# Patient Record
Sex: MALE | Race: WHITE | Employment: OTHER | URBAN - METROPOLITAN AREA
[De-identification: names, ages, dates, MRNs, and addresses within clinical notes are randomized per-mention and may not be internally consistent; named-entity substitution may affect disease eponyms.]

---

## 2019-04-24 ENCOUNTER — HOSPITAL ENCOUNTER (OUTPATIENT)
Dept: CARDIAC CATH/INVASIVE PROCEDURES | Age: 84
Discharge: HOME | End: 2019-04-24
Payer: MEDICARE

## 2019-04-24 VITALS
DIASTOLIC BLOOD PRESSURE: 83 MMHG | HEART RATE: 61 BPM | BODY MASS INDEX: 30.62 KG/M2 | HEIGHT: 68 IN | WEIGHT: 202 LBS | OXYGEN SATURATION: 94 % | RESPIRATION RATE: 16 BRPM | SYSTOLIC BLOOD PRESSURE: 135 MMHG | TEMPERATURE: 98.6 F

## 2019-04-24 LAB
EKG ATRIAL RATE: 357 BPM
EKG Q-T INTERVAL: 446 MS
EKG QRS DURATION: 106 MS
EKG QTC CALCULATION (BAZETT): 452 MS
EKG R AXIS: -8 DEGREES
EKG T AXIS: 2 DEGREES
EKG VENTRICULAR RATE: 62 BPM
GFR NON-AFRICAN AMERICAN: 45 ML/MIN
GFR SERPL CREATININE-BSD FRML MDRD: 55 ML/MIN
GFR SERPL CREATININE-BSD FRML MDRD: ABNORMAL ML/MIN/{1.73_M2}
GLUCOSE BLD-MCNC: 128 MG/DL (ref 74–100)
LV EF: 58 %
LVEF MODALITY: NORMAL
POC CHLORIDE: 105 MMOL/L (ref 98–107)
POC CREATININE: 1.48 MG/DL (ref 0.51–1.19)
POC HEMATOCRIT: 42 % (ref 41–53)
POC HEMOGLOBIN: 14.4 G/DL (ref 13.5–17.5)
POC INR: 3.2
POC POTASSIUM: 3.7 MMOL/L (ref 3.5–4.5)
POC SODIUM: 146 MMOL/L (ref 138–146)
PROTHROMBIN TIME, POC: 35.8 SEC (ref 10.4–14.2)

## 2019-04-24 PROCEDURE — 6360000002 HC RX W HCPCS

## 2019-04-24 PROCEDURE — 7100000010 HC PHASE II RECOVERY - FIRST 15 MIN

## 2019-04-24 PROCEDURE — 93312 ECHO TRANSESOPHAGEAL: CPT

## 2019-04-24 PROCEDURE — 7100000011 HC PHASE II RECOVERY - ADDTL 15 MIN

## 2019-04-24 PROCEDURE — 82947 ASSAY GLUCOSE BLOOD QUANT: CPT

## 2019-04-24 PROCEDURE — 93005 ELECTROCARDIOGRAM TRACING: CPT

## 2019-04-24 PROCEDURE — 82565 ASSAY OF CREATININE: CPT

## 2019-04-24 PROCEDURE — 82435 ASSAY OF BLOOD CHLORIDE: CPT

## 2019-04-24 PROCEDURE — 92960 CARDIOVERSION ELECTRIC EXT: CPT | Performed by: INTERNAL MEDICINE

## 2019-04-24 PROCEDURE — 2709999900 HC NON-CHARGEABLE SUPPLY

## 2019-04-24 PROCEDURE — 85610 PROTHROMBIN TIME: CPT

## 2019-04-24 PROCEDURE — 93325 DOPPLER ECHO COLOR FLOW MAPG: CPT

## 2019-04-24 PROCEDURE — 84295 ASSAY OF SERUM SODIUM: CPT

## 2019-04-24 PROCEDURE — 84132 ASSAY OF SERUM POTASSIUM: CPT

## 2019-04-24 PROCEDURE — 85014 HEMATOCRIT: CPT

## 2019-04-24 RX ORDER — SOTALOL HYDROCHLORIDE 80 MG/1
80 TABLET ORAL 2 TIMES DAILY
COMMUNITY

## 2019-04-24 RX ORDER — SODIUM CHLORIDE 0.9 % (FLUSH) 0.9 %
10 SYRINGE (ML) INJECTION PRN
Status: DISCONTINUED | OUTPATIENT
Start: 2019-04-24 | End: 2019-04-25 | Stop reason: HOSPADM

## 2019-04-24 RX ORDER — SODIUM CHLORIDE 0.9 % (FLUSH) 0.9 %
10 SYRINGE (ML) INJECTION EVERY 12 HOURS SCHEDULED
Status: DISCONTINUED | OUTPATIENT
Start: 2019-04-24 | End: 2019-04-25 | Stop reason: HOSPADM

## 2019-04-24 RX ORDER — SODIUM CHLORIDE 9 MG/ML
INJECTION, SOLUTION INTRAVENOUS CONTINUOUS
Status: DISCONTINUED | OUTPATIENT
Start: 2019-04-24 | End: 2019-04-25 | Stop reason: HOSPADM

## 2019-04-24 RX ORDER — WARFARIN SODIUM 5 MG/1
5 TABLET ORAL
COMMUNITY

## 2019-04-24 RX ORDER — VALSARTAN AND HYDROCHLOROTHIAZIDE 320; 12.5 MG/1; MG/1
1 TABLET, FILM COATED ORAL DAILY
COMMUNITY

## 2019-04-24 RX ORDER — UBIDECARENONE 75 MG
50 CAPSULE ORAL DAILY
COMMUNITY

## 2019-04-24 RX ORDER — ASPIRIN 81 MG/1
81 TABLET, CHEWABLE ORAL DAILY
COMMUNITY

## 2019-04-24 RX ORDER — DOXAZOSIN MESYLATE 4 MG/1
TABLET ORAL 2 TIMES DAILY
COMMUNITY

## 2019-04-24 RX ORDER — AMLODIPINE BESYLATE 5 MG/1
5 TABLET ORAL DAILY
COMMUNITY

## 2019-04-24 RX ORDER — SIMVASTATIN 40 MG
40 TABLET ORAL NIGHTLY
COMMUNITY

## 2019-04-24 RX ORDER — ALLOPURINOL 300 MG/1
300 TABLET ORAL DAILY
COMMUNITY

## 2019-04-24 RX ORDER — PAROXETINE HYDROCHLORIDE 20 MG/1
20 TABLET, FILM COATED ORAL EVERY MORNING
COMMUNITY

## 2019-04-24 RX ADMIN — SODIUM CHLORIDE: 9 INJECTION, SOLUTION INTRAVENOUS at 10:38

## 2019-04-24 NOTE — OP NOTE
[facility]  Transesophageal Echocardiogram and Cardioversion       Today's Date:  4/24/2019  Ordering Physician:  Dr. Elijah Reardon  Indication:   A. fib    Operators:  Primary:   Dr. Joshua Camacho (Attending Physician)  Assistant:   Salina Arndt MD (Cardiovascular Fellow)    Pre Procedure Conscious Sedation Data:    ASA Class:    [] I [] II [x] III [] IV    Mallampati Class:  [] I [] II [x] III [] IV    Patient seen and examined. History and Physical reviewed. Labs reviewed. After informed consent was obtained with explanation of the risks and benefits, the patient was brought to Cath lab. All sedation was administered by the cardiologist. The oropharynx was pre anesthetisized with cetacaine spray. FABIAN:    Structures:    LA:  Dilated  NEHEMIAH:  No thrombus  RA:  Dilated  RV:   Normal  LV:  Normal  Estimated LVEF: 55%  Aorta:   Mild atheromatous disease arch  Pericardium: No pericardial effusion  Septum:  No intracardiac shunt via color Doppler. Valves:    Mitral Valve:  Structurally normal. Mild regurgitation is identified with a central jet noted. Aortic Valve: The aortic valve is trileaflet and opens adequately. No regurgitiation is identified. Tricuspid valve: Structurally normal. Trace-mild regurgitation is identified. Pulmonary valve: Normal. No significant regurgitation    No valvular vegetations or thrombus identified. FABIAN Summary:     1. A FABIAN was performed without complications. 2. LVEF 55%  3. No thrombus or valvular vegetation identified  4. Mild MR and trace-mild TR  4. Proceed with Cardioversion      CARDIOVERSION:    After an adequate level of sedation was achieved, 360J in biphasic synchronized delivery was administered. Conversion to normal sinus rhythm. Cardioversion was successful. The patient awoke without complications. A post procedure 12 lead ECG was ordered. There were no complications encountered.     The patient will continue with the discharge meds and has

## 2019-04-24 NOTE — OP NOTE
Today's Date: 4/24/2019  Primary/Ordering Cardiologist:   Indication: atrial fibrillation. Patient seen and examined. History and Physical reviewed. Labs reviewed. After informed consent was obtained with explanation of the risks and benefits, the patient was prepared using standard tecqniques. All Conscious Sedation was administered via the Cardiologist.     CARDIOVERSION:    After an adequate level of sedation was achieved  360J in biphasic synchronized delivery was administered. conversion to normal sinus rhythm. The patient awoke without complications. A post procedure 12 L ECG was ordered and reviewed. The patient will continue with the same medications. Long term care and cardiovascular management    Impression:  Successful Consious Sedation - safely  Successful Cardioversion    Complications: There were no complications encountered.

## 2019-04-24 NOTE — PROGRESS NOTES
Patient admitted, consent signed and questions answered. Patient ready for procedure. Call light to reach with side rails up 2 of 2. Family at bedside with patient. History and physical needing update.

## 2019-04-24 NOTE — H&P
Juan Diego Wagner is an 82 y.o. who presents for Elective FABIAN cardioversion. Past Medical History:   Diagnosis Date    Anticoagulated     COUMADIN    Atrial fibrillation (HCC)     Diabetes mellitus ([address]     Dribbling urine     Gout     H/O cataract     H/O migraine     H/O prostate cancer     Hematuria     HTN (hypertension)     Kidney stones     SUKUMAR (obstructive sleep apnea)        Allergies: No Known Allergies    Active Problems:    * No active hospital problems. *  Resolved Problems:    * No resolved hospital problems. *    Blood pressure (!) 163/98, pulse 65, temperature 98.6 °F (37 °C), temperature source Oral, resp. rate 18, height 5' 8\" (1.727 m), weight 202 lb (91.6 kg), SpO2 93 %. Review of Systems    Physical Exam  Chest- clear to auscultation  cvs s1s2 irregular  Abd. Soft bs present  Ext. No edema. Assessment:  Early persistent atrial fibrillation    Plan: Will proceed with [facility] cardioversion.      Robin Sorenson MD  4/24/2019

## 2019-04-24 NOTE — PROGRESS NOTES
Received post FABIAN/CV to [facility] to room 12. Assessment obtained. Restrictions reviewed with patient. Post procedure pathway initiated. Family at side. Patient without complaints.

## 2019-04-25 LAB
EKG ATRIAL RATE: 57 BPM
EKG P AXIS: 42 DEGREES
EKG P-R INTERVAL: 232 MS
EKG Q-T INTERVAL: 494 MS
EKG QRS DURATION: 110 MS
EKG QTC CALCULATION (BAZETT): 480 MS
EKG R AXIS: -7 DEGREES
EKG T AXIS: 9 DEGREES
EKG VENTRICULAR RATE: 57 BPM

## 2022-05-15 ENCOUNTER — APPOINTMENT (OUTPATIENT)
Dept: GENERAL RADIOLOGY | Age: 87
End: 2022-05-15
Payer: MEDICARE

## 2022-05-15 ENCOUNTER — APPOINTMENT (OUTPATIENT)
Dept: CT IMAGING | Age: 87
End: 2022-05-15
Payer: MEDICARE

## 2022-05-15 ENCOUNTER — HOSPITAL ENCOUNTER (EMERGENCY)
Age: 87
Discharge: HOME | End: 2022-05-15
Attending: EMERGENCY MEDICINE
Payer: MEDICARE

## 2022-05-15 VITALS
WEIGHT: 200 LBS | HEIGHT: 68 IN | BODY MASS INDEX: 30.31 KG/M2 | OXYGEN SATURATION: 89 % | TEMPERATURE: 100.2 F | SYSTOLIC BLOOD PRESSURE: 160 MMHG | HEART RATE: 87 BPM | DIASTOLIC BLOOD PRESSURE: 116 MMHG | RESPIRATION RATE: 20 BRPM

## 2022-05-15 DIAGNOSIS — R41.3 MEMORY CHANGES: Primary | ICD-10-CM

## 2022-05-15 LAB
ABSOLUTE EOS #: 0.21 K/UL (ref 0–0.44)
ABSOLUTE IMMATURE GRANULOCYTE: 0.02 K/UL (ref 0–0.3)
ABSOLUTE LYMPH #: 1.94 K/UL (ref 1.1–3.7)
ABSOLUTE MONO #: 0.41 K/UL (ref 0.1–1.2)
ANION GAP SERPL CALCULATED.3IONS-SCNC: 12 MMOL/L (ref 9–17)
BASOPHILS # BLD: 1 % (ref 0–2)
BASOPHILS ABSOLUTE: 0.04 K/UL (ref 0–0.2)
BUN BLDV-MCNC: 23 MG/DL (ref 8–23)
BUN/CREAT BLD: 17 (ref 9–20)
CALCIUM SERPL-MCNC: 9.4 MG/DL (ref 8.6–10.4)
CHLORIDE BLD-SCNC: 103 MMOL/L (ref 98–107)
CO2: 27 MMOL/L (ref 20–31)
CREAT SERPL-MCNC: 1.32 MG/DL (ref 0.7–1.2)
EOSINOPHILS RELATIVE PERCENT: 3 % (ref 1–4)
GFR AFRICAN AMERICAN: >60 ML/MIN
GFR NON-AFRICAN AMERICAN: 52 ML/MIN
GFR SERPL CREATININE-BSD FRML MDRD: ABNORMAL ML/MIN/{1.73_M2}
GLUCOSE BLD-MCNC: 159 MG/DL (ref 70–99)
GLUCOSE BLD-MCNC: 165 MG/DL (ref 75–110)
HCT VFR BLD CALC: 40.8 % (ref 40.7–50.3)
HEMOGLOBIN: 13.6 G/DL (ref 13–17)
IMMATURE GRANULOCYTES: 0 %
INR BLD: 1
LACTIC ACID: 2.8 MMOL/L (ref 0.5–2.2)
LYMPHOCYTES # BLD: 31 % (ref 24–43)
MAGNESIUM: 1.8 MG/DL (ref 1.6–2.6)
MCH RBC QN AUTO: 31.9 PG (ref 25.2–33.5)
MCHC RBC AUTO-ENTMCNC: 33.3 G/DL (ref 28.4–34.8)
MCV RBC AUTO: 95.6 FL (ref 82.6–102.9)
MONOCYTES # BLD: 7 % (ref 3–12)
NRBC AUTOMATED: 0 PER 100 WBC
PDW BLD-RTO: 13.1 % (ref 11.8–14.4)
PLATELET # BLD: 157 K/UL (ref 138–453)
PMV BLD AUTO: 10.2 FL (ref 8.1–13.5)
POTASSIUM SERPL-SCNC: 3.8 MMOL/L (ref 3.7–5.3)
PROTHROMBIN TIME: 12.9 SEC (ref 11.5–14.2)
RBC # BLD: 4.27 M/UL (ref 4.21–5.77)
SEG NEUTROPHILS: 58 % (ref 36–65)
SEGMENTED NEUTROPHILS ABSOLUTE COUNT: 3.61 K/UL (ref 1.5–8.1)
SODIUM BLD-SCNC: 142 MMOL/L (ref 135–144)
TOTAL CK: 97 U/L (ref 39–308)
TROPONIN, HIGH SENSITIVITY: 43 NG/L (ref 0–22)
WBC # BLD: 6.2 K/UL (ref 3.5–11.3)

## 2022-05-15 PROCEDURE — 85025 COMPLETE CBC W/AUTO DIFF WBC: CPT

## 2022-05-15 PROCEDURE — 84484 ASSAY OF TROPONIN QUANT: CPT

## 2022-05-15 PROCEDURE — 71045 X-RAY EXAM CHEST 1 VIEW: CPT

## 2022-05-15 PROCEDURE — 93005 ELECTROCARDIOGRAM TRACING: CPT | Performed by: EMERGENCY MEDICINE

## 2022-05-15 PROCEDURE — 70450 CT HEAD/BRAIN W/O DYE: CPT

## 2022-05-15 PROCEDURE — 82550 ASSAY OF CK (CPK): CPT

## 2022-05-15 PROCEDURE — 80048 BASIC METABOLIC PNL TOTAL CA: CPT

## 2022-05-15 PROCEDURE — 99285 EMERGENCY DEPT VISIT HI MDM: CPT

## 2022-05-15 PROCEDURE — 36415 COLL VENOUS BLD VENIPUNCTURE: CPT

## 2022-05-15 PROCEDURE — 83735 ASSAY OF MAGNESIUM: CPT

## 2022-05-15 PROCEDURE — 85610 PROTHROMBIN TIME: CPT

## 2022-05-15 PROCEDURE — 82947 ASSAY GLUCOSE BLOOD QUANT: CPT

## 2022-05-15 PROCEDURE — 83605 ASSAY OF LACTIC ACID: CPT

## 2022-05-15 RX ORDER — SERTRALINE HYDROCHLORIDE 20 MG/ML
25 SOLUTION ORAL DAILY
COMMUNITY

## 2022-05-15 RX ORDER — FERROUS SULFATE 325(65) MG
325 TABLET ORAL
COMMUNITY

## 2022-05-15 RX ORDER — POTASSIUM CHLORIDE 1.5 G/1.77G
20 POWDER, FOR SOLUTION ORAL 2 TIMES DAILY
COMMUNITY

## 2022-05-15 RX ORDER — RIVASTIGMINE TARTRATE 3 MG/1
3 CAPSULE ORAL 2 TIMES DAILY
COMMUNITY

## 2022-05-15 RX ORDER — SERTRALINE HYDROCHLORIDE 25 MG/1
20 TABLET, FILM COATED ORAL DAILY
COMMUNITY

## 2022-05-15 RX ORDER — FUROSEMIDE 40 MG/1
40 TABLET ORAL DAILY
COMMUNITY

## 2022-05-15 ASSESSMENT — ENCOUNTER SYMPTOMS
BACK PAIN: 0
CHEST TIGHTNESS: 0
EYE PAIN: 0
ABDOMINAL PAIN: 0
ABDOMINAL DISTENTION: 0
SHORTNESS OF BREATH: 0
FACIAL SWELLING: 0
EYE DISCHARGE: 0

## 2022-05-15 ASSESSMENT — PAIN - FUNCTIONAL ASSESSMENT: PAIN_FUNCTIONAL_ASSESSMENT: NONE - DENIES PAIN

## 2022-05-15 NOTE — ED PROVIDER NOTES
EMERGENCY DEPARTMENT ENCOUNTER    Pt Name: Caron Overton  MRN: [id number]  [place] 1935  Date of evaluation: 5/15/22  CHIEF COMPLAINT       Chief Complaint   Patient presents with    Altered Mental Status     HISTORY OF PRESENT ILLNESS   HPI   Patient is a 61-year-old male who was brought to the emergency department by EMS after he was found walking on the highway. Patient stated that he was walking to the [employer] to buy a newspaper. He denied any trauma or injury. States he does not drive. Dated that he walked on the highway because he saw a lady in a black dress on the highway and was attempting to get to her. Per EMS there was no evidence of auto accident. Patient's denies falling or hitting his head. Denies chest pain, shortness of breath, nausea, vomiting, fevers or chills    REVIEW OF SYSTEMS     Review of Systems   Constitutional: Negative for chills, diaphoresis and fever. HENT: Negative for congestion, ear pain and facial swelling. Eyes: Negative for pain, discharge and visual disturbance. Respiratory: Negative for chest tightness and shortness of breath. Cardiovascular: Negative for chest pain and palpitations. Gastrointestinal: Negative for abdominal distention and abdominal pain. Genitourinary: Negative for difficulty urinating and flank pain. Musculoskeletal: Negative for back pain. Skin: Negative for wound. Neurological: Negative for dizziness, light-headedness and headaches.      PASTMEDICAL HISTORY     Past Medical History:   Diagnosis Date    Anticoagulated     COUMADIN    Atrial fibrillation (HCC)     Diabetes mellitus ([address]     Dribbling urine     Gout     H/O cataract     H/O migraine     H/O prostate cancer     Hematuria     HTN (hypertension)     Kidney stones     SUKUMAR (obstructive sleep apnea)      SURGICAL HISTORY       Past Surgical History:   Procedure Laterality Date    APPENDECTOMY      BLADDER SURGERY      SPHINTER CUFF INSERT    CARDIOVERSION 04/24/2019    CHOLECYSTECTOMY      JOINT REPLACEMENT      RIGHT AND LEFT    LITHOTRIPSY      PROSTATECTOMY      ROTATOR CUFF REPAIR Left     TRANSESOPHAGEAL ECHOCARDIOGRAM  04/24/2019     CURRENT MEDICATIONS       Previous Medications    ALLOPURINOL (ZYLOPRIM) 300 MG TABLET    Take 300 mg by mouth daily    AMLODIPINE (NORVASC) 5 MG TABLET    Take 5 mg by mouth daily    ASPIRIN 81 MG CHEWABLE TABLET    Take 81 mg by mouth daily    DAPAGLIFLOZIN (FARXIGA) 5 MG TABLET    Take 5 mg by mouth every morning    DOXAZOSIN (CARDURA) 4 MG TABLET    Take by mouth 2 times daily    FERROUS SULFATE (IRON 325) 325 (65 FE) MG TABLET    Take 325 mg by mouth daily (with breakfast)    FUROSEMIDE (LASIX) 40 MG TABLET    Take 40 mg by mouth daily    METFORMIN (GLUCOPHAGE) 500 MG TABLET    Take 500 mg by mouth daily    PAROXETINE (PAXIL) 20 MG TABLET    Take 20 mg by mouth every morning    POTASSIUM CHLORIDE (KLOR-CON) 20 MEQ PACKET    Take 20 mEq by mouth 2 times daily    RIVASTIGMINE (EXELON) 3 MG CAPSULE    Take 3 mg by mouth 2 times daily    SERTRALINE (ZOLOFT) 20 MG/ML CONCENTRATED SOLUTION    Take 25 mg by mouth daily    SERTRALINE (ZOLOFT) 25 MG TABLET    Take 20 mg by mouth daily    SIMVASTATIN (ZOCOR) 40 MG TABLET    Take 40 mg by mouth nightly    SOTALOL (BETAPACE) 80 MG TABLET    Take 80 mg by mouth 2 times daily    VALSARTAN-HYDROCHLOROTHIAZIDE (DIOVAN-HCT) 320-12.5 MG PER TABLET    Take 1 tablet by mouth daily    VITAMIN B-12 (CYANOCOBALAMIN) 100 MCG TABLET    Take 50 mcg by mouth daily    VITAMIN D (CHOLECALCIFEROL) 1000 UNIT TABS TABLET    Take 1,000 Units by mouth daily    WARFARIN (COUMADIN) 5 MG TABLET    Take 5 mg by mouth     ALLERGIES     has No Known Allergies. FAMILY HISTORY     has no family status information on file.       SOCIAL HISTORY       Social History     Tobacco Use    Smoking status: Never Smoker    Smokeless tobacco: Never Used   Vaping Use    Vaping Use: Never used   Substance Use Topics    Alcohol use: Not Currently    Drug use: Never     PHYSICAL EXAM     INITIAL VITALS: BP (!) 160/116   Pulse 87   Temp 100.2 °F (37.9 °C) (Oral)   Resp 20   Ht 5' 8\" (1.727 m)   Wt 200 lb (90.7 kg)   SpO2 (!) 89%   BMI 30.41 kg/m²    Physical Exam  Vitals and nursing note reviewed. Constitutional:       General: He is not in acute distress. Appearance: He is well-developed. He is not diaphoretic. HENT:      Head: Normocephalic and atraumatic. Eyes:      Pupils: Pupils are equal, round, and reactive to light. Cardiovascular:      Rate and Rhythm: Normal rate and regular rhythm. Pulmonary:      Effort: Pulmonary effort is normal.      Breath sounds: Normal breath sounds. Abdominal:      General: Bowel sounds are normal.      Palpations: Abdomen is soft. Musculoskeletal:         General: Normal range of motion. Cervical back: Normal range of motion and neck supple. Skin:     General: Skin is warm. Capillary Refill: Capillary refill takes less than 2 seconds. Neurological:      Mental Status: He is alert and oriented to person, place, and time. MEDICAL DECISION MAKING:   The patient is a 82-year-old male who presented to the emergency department by EMS secondary to being found walking on the highway. Patient has an IUD he is alert to name place and circumstance but is unable to recall his phone number. Patient's family contacted wife stated that she had not seen him since last night and she not where he was. Imaging and labs will be obtained, awaiting family arrival.  Slightly elevated lactic acid otherwise no acute findings on laboratory analysis or imaging. Patient's family presented to the emergency department. Patient's wife and family felt that patient had got confused while going to get a newspaper. Apparently 1 month ago he walked to get a newspaper on his own and was proud of himself for that and does not drive.   Patient's had no focal neurodeficit on exam ambulating without difficulty. Discharged home with family given outpatient follow-up and parameters to return to the emergency department       All patient's question's and concerns were answered prior to disposition and patient and/or family expressed understanding and agreement of treatment plan. CRITICAL CARE:              NIH STROKE SCALE:            PROCEDURES:    Procedures    DIAGNOSTIC RESULTS   EKG:All EKG's are interpreted by the Emergency Department Physician who either signs or Co-signs this chart in the absence of a cardiologist.        RADIOLOGY:All plain film, CT, MRI, and formal ultrasound images (except ED bedside ultrasound) are read by the radiologist, see reports below, unless otherwisenoted in MDM or here. CT Head WO Contrast   Final Result   No acute intracranial abnormality. XR CHEST PORTABLE   Preliminary Result   Low lung volumes with cardiomegaly. No evidence of acute process. LABS: All lab results were reviewed by myself, and all abnormals are listed below.   Labs Reviewed   LACTIC ACID - Abnormal; Notable for the following components:       Result Value    Lactic Acid 2.8 (*)     All other components within normal limits   BASIC METABOLIC PANEL W/ REFLEX TO MG FOR LOW K - Abnormal; Notable for the following components:    Glucose 159 (*)     CREATININE 1.32 (*)     GFR Non- 52 (*)     All other components within normal limits   TROPONIN - Abnormal; Notable for the following components:    Troponin, High Sensitivity 43 (*)     All other components within normal limits   POC GLUCOSE FINGERSTICK - Abnormal; Notable for the following components:    POC Glucose 165 (*)     All other components within normal limits   CK   MAGNESIUM   CBC WITH AUTO DIFFERENTIAL   PROTIME-INR   URINALYSIS WITH MICROSCOPIC   POCT GLUCOSE       EMERGENCY DEPARTMENTCOURSE:         Vitals:    Vitals:    05/15/22 0825   BP: (!) 160/116   Pulse: 87   Resp: 20   Temp: 100.2 °F (37.9 °C)   TempSrc: Oral   SpO2: (!) 89%   Weight: 200 lb (90.7 kg)   Height: 5' 8\" (1.727 m)       The patient was given the following medications while in the emergency department:  No orders of the defined types were placed in this encounter. CONSULTS:  None    FINAL IMPRESSION      1. Memory changes          DISPOSITION/PLAN   DISPOSITION Ed Observation 05/15/2022 10:25:55 AM      PATIENT REFERRED TO:  No follow-up provider specified. DISCHARGE MEDICATIONS:  New Prescriptions    No medications on file     Pee Rincon MD  Attending Emergency Physician      The care is provided during an unprecedented national emergency due to the novel coronavirus, COVID 19. This note was created with the assistance of a speech-recognition program. While intending to generate a document that actually reflects the content of the visit, no guarantees can be provided that every mistake has been identified and corrected by editing.     Lake Brandt MD  18/37/23 0727

## 2022-05-16 LAB
EKG ATRIAL RATE: 76 BPM
EKG P AXIS: 17 DEGREES
EKG P-R INTERVAL: 210 MS
EKG Q-T INTERVAL: 430 MS
EKG QRS DURATION: 114 MS
EKG QTC CALCULATION (BAZETT): 483 MS
EKG R AXIS: -59 DEGREES
EKG T AXIS: 70 DEGREES
EKG VENTRICULAR RATE: 76 BPM

## 2023-04-19 ENCOUNTER — HOSPITAL ENCOUNTER (OUTPATIENT)
Dept: ULTRASOUND IMAGING | Age: 88
Discharge: HOME | End: 2023-04-21
Payer: MEDICARE

## 2023-04-19 ENCOUNTER — ANESTHESIA EVENT (OUTPATIENT)
Dept: OPERATING ROOM | Age: 88
End: 2023-04-19
Payer: MEDICARE

## 2023-04-19 ENCOUNTER — ANESTHESIA (OUTPATIENT)
Dept: OPERATING ROOM | Age: 88
End: 2023-04-19
Payer: MEDICARE

## 2023-04-19 ENCOUNTER — HOSPITAL ENCOUNTER (OUTPATIENT)
Age: 88
Setting detail: OUTPATIENT SURGERY
Discharge: HOME | End: 2023-04-19
Attending: INTERNAL MEDICINE | Admitting: INTERNAL MEDICINE
Payer: MEDICARE

## 2023-04-19 VITALS
OXYGEN SATURATION: 95 % | HEIGHT: 68 IN | DIASTOLIC BLOOD PRESSURE: 73 MMHG | HEART RATE: 65 BPM | WEIGHT: 191 LBS | TEMPERATURE: 97 F | BODY MASS INDEX: 28.95 KG/M2 | RESPIRATION RATE: 18 BRPM | SYSTOLIC BLOOD PRESSURE: 132 MMHG

## 2023-04-19 DIAGNOSIS — K86.89 PANCREATIC MASS: ICD-10-CM

## 2023-04-19 LAB
ANION GAP: 14 MMOL/L (ref 7–16)
EGFR, POC: 46 ML/MIN/1.73M2
GLUCOSE BLD-MCNC: 106 MG/DL (ref 75–110)
GLUCOSE BLD-MCNC: 126 MG/DL (ref 74–100)
POC BUN: 27 MG/DL (ref 8–26)
POC CHLORIDE: 104 MMOL/L (ref 98–107)
POC CREATININE: 1.47 MG/DL (ref 0.51–1.19)
POC IONIZED CALCIUM: 1.27 MMOL/L (ref 1.15–1.33)
POC POTASSIUM: 3.6 MMOL/L (ref 3.5–4.5)
POC SODIUM: 143 MMOL/L (ref 138–146)
POC TCO2: 26 MMOL/L (ref 22–30)

## 2023-04-19 PROCEDURE — 6360000002 HC RX W HCPCS

## 2023-04-19 PROCEDURE — 3609012400 HC EGD TRANSORAL BIOPSY SINGLE/MULTIPLE: Performed by: INTERNAL MEDICINE

## 2023-04-19 PROCEDURE — 88305 TISSUE EXAM BY PATHOLOGIST: CPT

## 2023-04-19 PROCEDURE — 93005 ELECTROCARDIOGRAM TRACING: CPT | Performed by: ANESTHESIOLOGY

## 2023-04-19 PROCEDURE — 3609156700 HC PROCTOSIGMOIDOSCOPY DX: Performed by: INTERNAL MEDICINE

## 2023-04-19 PROCEDURE — 84520 ASSAY OF UREA NITROGEN: CPT

## 2023-04-19 PROCEDURE — 3609018500 HC EGD US SCOPE W/ADJACENT STRUCTURES: Performed by: INTERNAL MEDICINE

## 2023-04-19 PROCEDURE — 76975 GI ENDOSCOPIC ULTRASOUND: CPT | Performed by: INTERNAL MEDICINE

## 2023-04-19 PROCEDURE — 80051 ELECTROLYTE PANEL: CPT

## 2023-04-19 PROCEDURE — 82330 ASSAY OF CALCIUM: CPT

## 2023-04-19 PROCEDURE — 7100000010 HC PHASE II RECOVERY - FIRST 15 MIN: Performed by: INTERNAL MEDICINE

## 2023-04-19 PROCEDURE — 3700000000 HC ANESTHESIA ATTENDED CARE: Performed by: INTERNAL MEDICINE

## 2023-04-19 PROCEDURE — 3700000001 HC ADD 15 MINUTES (ANESTHESIA): Performed by: INTERNAL MEDICINE

## 2023-04-19 PROCEDURE — 2500000003 HC RX 250 WO HCPCS

## 2023-04-19 PROCEDURE — 82565 ASSAY OF CREATININE: CPT

## 2023-04-19 PROCEDURE — 2580000003 HC RX 258: Performed by: ANESTHESIOLOGY

## 2023-04-19 PROCEDURE — 82947 ASSAY GLUCOSE BLOOD QUANT: CPT

## 2023-04-19 PROCEDURE — 2709999900 HC NON-CHARGEABLE SUPPLY: Performed by: INTERNAL MEDICINE

## 2023-04-19 PROCEDURE — 7100000011 HC PHASE II RECOVERY - ADDTL 15 MIN: Performed by: INTERNAL MEDICINE

## 2023-04-19 RX ORDER — FENTANYL CITRATE 50 UG/ML
50 INJECTION, SOLUTION INTRAMUSCULAR; INTRAVENOUS EVERY 5 MIN PRN
Status: DISCONTINUED | OUTPATIENT
Start: 2023-04-19 | End: 2023-04-19 | Stop reason: HOSPADM

## 2023-04-19 RX ORDER — PANTOPRAZOLE SODIUM 40 MG/1
40 TABLET, DELAYED RELEASE ORAL
Qty: 30 TABLET | Refills: 0 | Status: SHIPPED | OUTPATIENT
Start: 2023-04-19

## 2023-04-19 RX ORDER — LIDOCAINE HYDROCHLORIDE 10 MG/ML
INJECTION, SOLUTION EPIDURAL; INFILTRATION; INTRACAUDAL; PERINEURAL PRN
Status: DISCONTINUED | OUTPATIENT
Start: 2023-04-19 | End: 2023-04-19 | Stop reason: SDUPTHER

## 2023-04-19 RX ORDER — FENTANYL CITRATE 50 UG/ML
25 INJECTION, SOLUTION INTRAMUSCULAR; INTRAVENOUS EVERY 5 MIN PRN
Status: DISCONTINUED | OUTPATIENT
Start: 2023-04-19 | End: 2023-04-19 | Stop reason: HOSPADM

## 2023-04-19 RX ORDER — SODIUM CHLORIDE 0.9 % (FLUSH) 0.9 %
5-40 SYRINGE (ML) INJECTION EVERY 12 HOURS SCHEDULED
Status: DISCONTINUED | OUTPATIENT
Start: 2023-04-19 | End: 2023-04-19 | Stop reason: HOSPADM

## 2023-04-19 RX ORDER — SODIUM CHLORIDE 9 MG/ML
INJECTION, SOLUTION INTRAVENOUS PRN
Status: DISCONTINUED | OUTPATIENT
Start: 2023-04-19 | End: 2023-04-19 | Stop reason: HOSPADM

## 2023-04-19 RX ORDER — PROPOFOL 10 MG/ML
INJECTION, EMULSION INTRAVENOUS PRN
Status: DISCONTINUED | OUTPATIENT
Start: 2023-04-19 | End: 2023-04-19 | Stop reason: SDUPTHER

## 2023-04-19 RX ORDER — SODIUM CHLORIDE 0.9 % (FLUSH) 0.9 %
5-40 SYRINGE (ML) INJECTION PRN
Status: DISCONTINUED | OUTPATIENT
Start: 2023-04-19 | End: 2023-04-19 | Stop reason: HOSPADM

## 2023-04-19 RX ORDER — ONDANSETRON 2 MG/ML
4 INJECTION INTRAMUSCULAR; INTRAVENOUS
Status: DISCONTINUED | OUTPATIENT
Start: 2023-04-19 | End: 2023-04-19 | Stop reason: HOSPADM

## 2023-04-19 RX ORDER — HYDRALAZINE HYDROCHLORIDE 20 MG/ML
10 INJECTION INTRAMUSCULAR; INTRAVENOUS
Status: DISCONTINUED | OUTPATIENT
Start: 2023-04-19 | End: 2023-04-19 | Stop reason: HOSPADM

## 2023-04-19 RX ORDER — PROPOFOL 10 MG/ML
INJECTION, EMULSION INTRAVENOUS CONTINUOUS PRN
Status: DISCONTINUED | OUTPATIENT
Start: 2023-04-19 | End: 2023-04-19 | Stop reason: SDUPTHER

## 2023-04-19 RX ORDER — SODIUM CHLORIDE, SODIUM LACTATE, POTASSIUM CHLORIDE, CALCIUM CHLORIDE 600; 310; 30; 20 MG/100ML; MG/100ML; MG/100ML; MG/100ML
INJECTION, SOLUTION INTRAVENOUS CONTINUOUS
Status: DISCONTINUED | OUTPATIENT
Start: 2023-04-19 | End: 2023-04-19 | Stop reason: HOSPADM

## 2023-04-19 RX ADMIN — LIDOCAINE HYDROCHLORIDE 50 MG: 10 INJECTION, SOLUTION EPIDURAL; INFILTRATION; INTRACAUDAL; PERINEURAL at 12:16

## 2023-04-19 RX ADMIN — SODIUM CHLORIDE, POTASSIUM CHLORIDE, SODIUM LACTATE AND CALCIUM CHLORIDE: 600; 310; 30; 20 INJECTION, SOLUTION INTRAVENOUS at 11:33

## 2023-04-19 RX ADMIN — PROPOFOL 10 MG: 10 INJECTION, EMULSION INTRAVENOUS at 12:27

## 2023-04-19 RX ADMIN — PROPOFOL 10 MG: 10 INJECTION, EMULSION INTRAVENOUS at 12:16

## 2023-04-19 RX ADMIN — PROPOFOL 150 MCG/KG/MIN: 10 INJECTION, EMULSION INTRAVENOUS at 12:16

## 2023-04-19 RX ADMIN — PROPOFOL 10 MG: 10 INJECTION, EMULSION INTRAVENOUS at 12:37

## 2023-04-19 ASSESSMENT — PAIN SCALES - GENERAL
PAINLEVEL_OUTOF10: 0

## 2023-04-19 ASSESSMENT — PAIN - FUNCTIONAL ASSESSMENT: PAIN_FUNCTIONAL_ASSESSMENT: NONE - DENIES PAIN

## 2023-04-19 NOTE — DISCHARGE INSTRUCTIONS
[facility]    POST-ENDOSCOPY INSTRUCTIONS    1. ACTIVITY   No driving, operating machinery, or making important decisions for 24 hours. Resume normal activity after 24 hours. You may return to work after 24 hours. 2. DIET        Resume your usual diet unless specified below. 3. MEDICATIONS    Resume your usual medications. Do not consume alcohol, tranquilizers, or sleeping medications for 24 hour unless advised by your physician. 4. PHYSICIAN FOLLOW-UP / INSTRUCTIONS    Please call the office/clinic in 10 days for biopsy results:      [  ] GI office:  [phone]          Follow up with your family physician as planned. 6. NORMAL CHANGES YOU MAY EXPERIENCE AFTER ENDOSCOPY:         EGD/EUS             FLEXIBLE SIGMOIDOSCOPY      Sore throat after EGD/EUS                 Passing of gas for several hours. A bloated feeling and belching from  Some mild abdominal cramping. air in stomach               You may feel fatigued for the next 24-48    hours due to the prep and sedation    7. CALL YOUR PHYSICIAN IF YOU EXPERIENCE ANY OF THE FOLLOWING      A. Passing blood rectally or vomiting blood (color may be red or black)      B. Severe abdominal pain or tenderness (that is not relieved by passing air)      C. Fever, chills, or excessive sweating      D.  Persistent nausea or vomiting      E.  Redness or swelling at the IV site    If you have additional questions, PLEASE call your doctor or the [place] [id number] [facility] ([phone])           No alcoholic beverages, no driving or operating machinery, no making important decisions for 24 hours. You may have a normal diet but should eat lightly day of surgery. Drink plenty of fluids.   Urinate within 8 hours after surgery, if unable to urinate call your doctor

## 2023-04-19 NOTE — OP NOTE
Electronically signed by Peggy Grant MD on 4/19/2023 at 12:25 PM
Repeat CT pancreas in 3 months to document cyst size stability                                      Follow up with Dr Michelle Romero.          Electronically signed by Joice Carrel, MD on 4/19/2023 at 12:56 PM

## 2023-04-19 NOTE — H&P
VITALS:  height is 5' 8\" (1.727 m) and weight is 191 lb (86.6 kg). His temporal temperature is 96.8 °F (36 °C). His blood pressure is 146/94 (abnormal) and his pulse is 72. His respiration is 18 and oxygen saturation is 94%. CONSTITUTIONAL:alert & cooperative, no acute distress. Very pleasant and talkative. SKIN:  Warm and dry, no rashes on exposed areas of skin. HEAD:  Normocephalic, atraumatic. EYES: EOMs intact. EARS:  Hearing loss suspected, mild. NOSE:  Nares patent. No rhinorrhea. MOUTH/THROAT:  benign  NECK:good ROM  LUNGS: Clear to auscultation bilaterally, no wheezes. CARDIOVASCULAR: Regular rate and rhythm. ABDOMEN: soft, non tender, non distended. EXTREMITIES: no edema bilateral lower extremities. IMPRESSIONS:   Pancreatic mass, rectal thickening   has a past medical history of Abnormal EKG (12/30/2022), Anesthesia complication, Atrial fibrillation ([address] Cancer ([address] Colon cancer ([address] COVID-19, Diabetes mellitus ([address] Dribbling urine, Forgetfulness, Gout, H/O cataract, H/O migraine, H/O prostate cancer, Hematuria, HTN (hypertension), Hyperlipidemia, Kidney stones, SUKUMAR (obstructive sleep apnea), Renal insufficiency, Under care of team, Under care of team, Vasovagal episode (12/29/2022), Wears glasses, Wears partial dentures, and Wellness examination.    PLANS:   CHRISTINA Granados PA-C  Electronically signed 4/19/2023 at 11:34 AM

## 2023-04-19 NOTE — ANESTHESIA POSTPROCEDURE EVALUATION
Department of Anesthesiology  Postprocedure Note    Patient: Teagan Hill  MRN: [id number]  YOB: 1935  Date of evaluation: 4/19/2023      Procedure Summary     Date: 04/19/23 Room / Location: [address]    Anesthesia Start: 8732 Anesthesia Stop: 1304    Procedures:       ENDOSCOPIC ULTRASOUND WITH PATHOLOGY      FLEXIBLE SIGMOIDOSCOPY      EGD BIOPSY Diagnosis:       Pancreatic mass      (PANCREATIC MASS, RECTAL THICKENING)    Surgeons: Jenni Zhong MD Responsible Provider: Jennetta Curling, MD    Anesthesia Type: MAC ASA Status: 3          Anesthesia Type: No value filed.     Kamille Phase I:      Kamille Phase II: Kamille Score: 10    POST-OP ANESTHESIA NOTE       /73   Pulse 65   Temp 97 °F (36.1 °C)   Resp 18   Ht 5' 8\" (1.727 m)   Wt 191 lb (86.6 kg)   SpO2 95%   BMI 29.04 kg/m²    Pain Assessment: 0-10  Pain Level: 0     T  Anesthesia Post Evaluation    Patient location during evaluation: PACU  Patient participation: complete - patient participated  Level of consciousness: awake  Pain score: 0  Airway patency: patent  Nausea & Vomiting: no nausea and no vomiting  Complications: no  Cardiovascular status: hemodynamically stable  Respiratory status: acceptable  Hydration status: stable

## 2023-04-19 NOTE — ANESTHESIA PRE PROCEDURE
Department of Anesthesiology  Preprocedure Note       Name:  Art Brown   Age:  82 y.o.  :  1935                                          MRN:  [id number]         Date:  2023      Surgeon: Randal Peck):  Chanda Ko MD    Procedure: Procedure(s):  ENDOSCOPIC ULTRASOUND WITH PATHOLOGY  FLEXIBLE SIGMOIDOSCOPY    Medications prior to admission:   Prior to Admission medications    Medication Sig Start Date End Date Taking?  Authorizing Provider   valsartan (DIOVAN) 160 MG tablet Take 1 tablet by mouth daily    Historical Provider, MD   IRON, FERROUS SULFATE, PO Take 40 mg by mouth every other day    Historical Provider, MD   Ascorbic Acid 500 MG CHEW Take 500 mg by mouth 2 times daily 21   Historical Provider, MD   dapagliflozin (FARXIGA) 5 MG tablet Take 1 tablet by mouth every morning    Historical Provider, MD   potassium chloride (KLOR-CON) 20 MEQ packet Take 10 mEq by mouth 2 times daily 10 meq daily    Historical Provider, MD   rivastigmine (EXELON) 3 MG capsule Take 2 capsules by mouth 2 times daily    Historical Provider, MD   sertraline (ZOLOFT) 25 MG tablet Take 1 tablet by mouth daily    Historical Provider, MD   allopurinol (ZYLOPRIM) 300 MG tablet Take 1 tablet by mouth daily    Historical Provider, MD   amLODIPine (NORVASC) 5 MG tablet Take 2 tablets by mouth daily    Historical Provider, MD   aspirin 81 MG chewable tablet Take 1 tablet by mouth daily Asa on hold starting 2023  OK per Dr. Lyndsay Stevenson per daughter    Historical Provider, MD   simvastatin (ZOCOR) 40 MG tablet Take 0.5 tablets by mouth daily    Historical Provider, MD   vitamin D (CHOLECALCIFEROL) 1000 UNIT TABS tablet Take 1 tablet by mouth daily    Historical Provider, MD       Current medications:    Current Facility-Administered Medications   Medication Dose Route Frequency Provider Last Rate Last Admin    lactated ringers IV soln infusion   IntraVENous Continuous Brian Norman  mL/hr at 23 1135

## 2023-04-20 LAB — SURGICAL PATHOLOGY REPORT: NORMAL

## 2023-04-21 LAB
EKG ATRIAL RATE: 58 BPM
EKG P AXIS: 20 DEGREES
EKG P-R INTERVAL: 238 MS
EKG Q-T INTERVAL: 478 MS
EKG QRS DURATION: 122 MS
EKG QTC CALCULATION (BAZETT): 469 MS
EKG R AXIS: -47 DEGREES
EKG T AXIS: 7 DEGREES
EKG VENTRICULAR RATE: 58 BPM

## 2023-07-17 ENCOUNTER — TELEPHONE (OUTPATIENT)
Dept: SURGERY | Age: 88
End: 2023-07-17

## 2023-07-17 NOTE — TELEPHONE ENCOUNTER
LVM to schedule for 6 month follow up for colon cancer. Pt was scheduled for this appointment on 5/1/23 at [facility] prior to the hospital closure. Gave new phone number/address.

## 2023-11-21 ENCOUNTER — APPOINTMENT (OUTPATIENT)
Dept: CT IMAGING | Facility: CLINIC | Age: 88
DRG: 291 | End: 2023-11-21
Payer: MEDICARE

## 2023-11-21 ENCOUNTER — HOSPITAL ENCOUNTER (INPATIENT)
Age: 88
LOS: 5 days | Discharge: HOME HEALTH | DRG: 291 | End: 2023-11-26
Attending: EMERGENCY MEDICINE | Admitting: STUDENT IN AN ORGANIZED HEALTH CARE EDUCATION/TRAINING PROGRAM
Payer: MEDICARE

## 2023-11-21 DIAGNOSIS — I48.20 CHRONIC ATRIAL FIBRILLATION (HCC): Primary | ICD-10-CM

## 2023-11-21 DIAGNOSIS — N18.32 STAGE 3B CHRONIC KIDNEY DISEASE (HCC): ICD-10-CM

## 2023-11-21 DIAGNOSIS — I50.9 ACUTE CONGESTIVE HEART FAILURE, UNSPECIFIED HEART FAILURE TYPE (HCC): ICD-10-CM

## 2023-11-21 PROBLEM — N18.30 STAGE 3 CHRONIC KIDNEY DISEASE (HCC): Status: ACTIVE | Noted: 2022-11-01

## 2023-11-21 PROBLEM — I42.9 CARDIOMYOPATHY, UNSPECIFIED (HCC): Status: ACTIVE | Noted: 2023-07-27

## 2023-11-21 PROBLEM — G30.9 ALZHEIMER'S DISEASE, UNSPECIFIED (HCC): Status: ACTIVE | Noted: 2023-04-27

## 2023-11-21 PROBLEM — F02.80 ALZHEIMER'S DISEASE, UNSPECIFIED (HCC): Status: ACTIVE | Noted: 2023-04-27

## 2023-11-21 PROBLEM — E78.5 DYSLIPIDEMIA: Status: ACTIVE | Noted: 2022-11-01

## 2023-11-21 PROBLEM — I10 ESSENTIAL HYPERTENSION: Status: ACTIVE | Noted: 2022-11-01

## 2023-11-21 PROBLEM — E11.9 DIABETES MELLITUS (HCC): Status: ACTIVE | Noted: 2022-11-01

## 2023-11-21 PROBLEM — G20.A1 PARKINSON'S DISEASE: Status: ACTIVE | Noted: 2022-12-29

## 2023-11-21 PROBLEM — I13.10 HYPERTENSIVE HEART AND KIDNEY DISEASE: Status: ACTIVE | Noted: 2022-11-18

## 2023-11-21 PROBLEM — I48.91 ATRIAL FIBRILLATION (HCC): Status: ACTIVE | Noted: 2022-11-01

## 2023-11-21 LAB
ANION GAP SERPL CALCULATED.3IONS-SCNC: 12 MMOL/L (ref 9–17)
BASOPHILS # BLD: 0 K/UL (ref 0–0.2)
BASOPHILS NFR BLD: 1 % (ref 0–2)
BNP SERPL-MCNC: 5074 PG/ML
BUN SERPL-MCNC: 27 MG/DL (ref 8–23)
CALCIUM SERPL-MCNC: 9.1 MG/DL (ref 8.6–10.4)
CHLORIDE SERPL-SCNC: 109 MMOL/L (ref 98–107)
CO2 SERPL-SCNC: 23 MMOL/L (ref 20–31)
CREAT SERPL-MCNC: 1.6 MG/DL (ref 0.7–1.2)
D DIMER PPP FEU-MCNC: 1.19 UG/ML FEU
EKG ATRIAL RATE: 54 BPM
EKG Q-T INTERVAL: 438 MS
EKG QRS DURATION: 108 MS
EKG QTC CALCULATION (BAZETT): 433 MS
EKG R AXIS: -50 DEGREES
EKG T AXIS: 28 DEGREES
EKG VENTRICULAR RATE: 59 BPM
EOSINOPHIL # BLD: 0.1 K/UL (ref 0–0.4)
EOSINOPHILS RELATIVE PERCENT: 2 % (ref 1–4)
ERYTHROCYTE [DISTWIDTH] IN BLOOD BY AUTOMATED COUNT: 15.9 % (ref 12.5–15.4)
FLUAV AG SPEC QL: NEGATIVE
FLUBV AG SPEC QL: NEGATIVE
GFR SERPL CREATININE-BSD FRML MDRD: 42 ML/MIN/1.73M2
GLUCOSE SERPL-MCNC: 120 MG/DL (ref 70–99)
HCT VFR BLD AUTO: 31 % (ref 41–53)
HGB BLD-MCNC: 10.2 G/DL (ref 13.5–17.5)
IRON SATN MFR SERPL: 29 % (ref 20–55)
IRON SERPL-MCNC: 63 UG/DL (ref 59–158)
LYMPHOCYTES NFR BLD: 2.4 K/UL (ref 1–4.8)
LYMPHOCYTES RELATIVE PERCENT: 50 % (ref 24–44)
MCH RBC QN AUTO: 33.7 PG (ref 26–34)
MCHC RBC AUTO-ENTMCNC: 33.1 G/DL (ref 31–37)
MCV RBC AUTO: 102.1 FL (ref 80–100)
MONOCYTES NFR BLD: 0.4 K/UL (ref 0.1–1.2)
MONOCYTES NFR BLD: 8 % (ref 2–11)
NEUTROPHILS NFR BLD: 39 % (ref 36–66)
NEUTS SEG NFR BLD: 1.9 K/UL (ref 1.8–7.7)
PLATELET # BLD AUTO: 216 K/UL (ref 140–450)
PMV BLD AUTO: 8.1 FL (ref 6–12)
POTASSIUM SERPL-SCNC: 4.2 MMOL/L (ref 3.7–5.3)
RBC # BLD AUTO: 3.03 M/UL (ref 4.5–5.9)
SARS-COV-2 RDRP RESP QL NAA+PROBE: NOT DETECTED
SODIUM SERPL-SCNC: 144 MMOL/L (ref 135–144)
SPECIMEN DESCRIPTION: NORMAL
TIBC SERPL-MCNC: 218 UG/DL (ref 250–450)
TROPONIN I SERPL HS-MCNC: 52 NG/L (ref 0–22)
TSH SERPL DL<=0.05 MIU/L-ACNC: 1.67 UIU/ML (ref 0.3–5)
UNSATURATED IRON BINDING CAPACITY: 155 UG/DL (ref 112–347)
WBC OTHER # BLD: 4.9 K/UL (ref 3.5–11)

## 2023-11-21 PROCEDURE — 6360000002 HC RX W HCPCS: Performed by: NURSE PRACTITIONER

## 2023-11-21 PROCEDURE — 84484 ASSAY OF TROPONIN QUANT: CPT

## 2023-11-21 PROCEDURE — 80048 BASIC METABOLIC PNL TOTAL CA: CPT

## 2023-11-21 PROCEDURE — 71260 CT THORAX DX C+: CPT

## 2023-11-21 PROCEDURE — 99223 1ST HOSP IP/OBS HIGH 75: CPT | Performed by: NURSE PRACTITIONER

## 2023-11-21 PROCEDURE — 93005 ELECTROCARDIOGRAM TRACING: CPT | Performed by: EMERGENCY MEDICINE

## 2023-11-21 PROCEDURE — 6370000000 HC RX 637 (ALT 250 FOR IP): Performed by: NURSE PRACTITIONER

## 2023-11-21 PROCEDURE — 83540 ASSAY OF IRON: CPT

## 2023-11-21 PROCEDURE — 2580000003 HC RX 258: Performed by: EMERGENCY MEDICINE

## 2023-11-21 PROCEDURE — 36415 COLL VENOUS BLD VENIPUNCTURE: CPT

## 2023-11-21 PROCEDURE — 6360000004 HC RX CONTRAST MEDICATION: Performed by: EMERGENCY MEDICINE

## 2023-11-21 PROCEDURE — 85379 FIBRIN DEGRADATION QUANT: CPT

## 2023-11-21 PROCEDURE — 83550 IRON BINDING TEST: CPT

## 2023-11-21 PROCEDURE — 83880 ASSAY OF NATRIURETIC PEPTIDE: CPT

## 2023-11-21 PROCEDURE — 2580000003 HC RX 258: Performed by: NURSE PRACTITIONER

## 2023-11-21 PROCEDURE — 6360000002 HC RX W HCPCS: Performed by: EMERGENCY MEDICINE

## 2023-11-21 PROCEDURE — 84443 ASSAY THYROID STIM HORMONE: CPT

## 2023-11-21 PROCEDURE — 96374 THER/PROPH/DIAG INJ IV PUSH: CPT

## 2023-11-21 PROCEDURE — 99285 EMERGENCY DEPT VISIT HI MDM: CPT

## 2023-11-21 PROCEDURE — 87635 SARS-COV-2 COVID-19 AMP PRB: CPT

## 2023-11-21 PROCEDURE — 2060000000 HC ICU INTERMEDIATE R&B

## 2023-11-21 PROCEDURE — 87804 INFLUENZA ASSAY W/OPTIC: CPT

## 2023-11-21 PROCEDURE — 85025 COMPLETE CBC W/AUTO DIFF WBC: CPT

## 2023-11-21 PROCEDURE — 6370000000 HC RX 637 (ALT 250 FOR IP): Performed by: EMERGENCY MEDICINE

## 2023-11-21 RX ORDER — SODIUM CHLORIDE 0.9 % (FLUSH) 0.9 %
5-40 SYRINGE (ML) INJECTION EVERY 12 HOURS SCHEDULED
Status: DISCONTINUED | OUTPATIENT
Start: 2023-11-21 | End: 2023-11-26 | Stop reason: HOSPADM

## 2023-11-21 RX ORDER — ACETAMINOPHEN 325 MG/1
650 TABLET ORAL EVERY 6 HOURS PRN
Status: DISCONTINUED | OUTPATIENT
Start: 2023-11-21 | End: 2023-11-26 | Stop reason: HOSPADM

## 2023-11-21 RX ORDER — SODIUM CHLORIDE 9 MG/ML
INJECTION, SOLUTION INTRAVENOUS PRN
Status: DISCONTINUED | OUTPATIENT
Start: 2023-11-21 | End: 2023-11-26 | Stop reason: HOSPADM

## 2023-11-21 RX ORDER — AMLODIPINE BESYLATE 10 MG/1
10 TABLET ORAL DAILY
Status: DISCONTINUED | OUTPATIENT
Start: 2023-11-21 | End: 2023-11-26 | Stop reason: HOSPADM

## 2023-11-21 RX ORDER — SODIUM CHLORIDE 0.9 % (FLUSH) 0.9 %
10 SYRINGE (ML) INJECTION PRN
Status: DISCONTINUED | OUTPATIENT
Start: 2023-11-21 | End: 2023-11-26 | Stop reason: HOSPADM

## 2023-11-21 RX ORDER — FUROSEMIDE 10 MG/ML
40 INJECTION INTRAMUSCULAR; INTRAVENOUS ONCE
Status: COMPLETED | OUTPATIENT
Start: 2023-11-21 | End: 2023-11-21

## 2023-11-21 RX ORDER — POLYETHYLENE GLYCOL 3350 17 G/17G
17 POWDER, FOR SOLUTION ORAL DAILY PRN
Status: DISCONTINUED | OUTPATIENT
Start: 2023-11-21 | End: 2023-11-26 | Stop reason: HOSPADM

## 2023-11-21 RX ORDER — LANOLIN ALCOHOL/MO/W.PET/CERES
325 CREAM (GRAM) TOPICAL EVERY OTHER DAY
Status: DISCONTINUED | OUTPATIENT
Start: 2023-11-21 | End: 2023-11-26 | Stop reason: HOSPADM

## 2023-11-21 RX ORDER — ONDANSETRON 4 MG/1
4 TABLET, ORALLY DISINTEGRATING ORAL EVERY 8 HOURS PRN
Status: DISCONTINUED | OUTPATIENT
Start: 2023-11-21 | End: 2023-11-26 | Stop reason: HOSPADM

## 2023-11-21 RX ORDER — POTASSIUM CHLORIDE 20 MEQ/1
40 TABLET, EXTENDED RELEASE ORAL PRN
Status: DISCONTINUED | OUTPATIENT
Start: 2023-11-21 | End: 2023-11-26 | Stop reason: HOSPADM

## 2023-11-21 RX ORDER — RIVASTIGMINE TARTRATE 1.5 MG/1
6 CAPSULE ORAL 2 TIMES DAILY
Status: DISCONTINUED | OUTPATIENT
Start: 2023-11-21 | End: 2023-11-26 | Stop reason: HOSPADM

## 2023-11-21 RX ORDER — POTASSIUM CHLORIDE 7.45 MG/ML
10 INJECTION INTRAVENOUS PRN
Status: DISCONTINUED | OUTPATIENT
Start: 2023-11-21 | End: 2023-11-26 | Stop reason: HOSPADM

## 2023-11-21 RX ORDER — ACETAMINOPHEN 650 MG/1
650 SUPPOSITORY RECTAL EVERY 6 HOURS PRN
Status: DISCONTINUED | OUTPATIENT
Start: 2023-11-21 | End: 2023-11-26 | Stop reason: HOSPADM

## 2023-11-21 RX ORDER — ASCORBIC ACID 500 MG
500 TABLET ORAL 2 TIMES DAILY
Status: DISCONTINUED | OUTPATIENT
Start: 2023-11-21 | End: 2023-11-26 | Stop reason: HOSPADM

## 2023-11-21 RX ORDER — ATORVASTATIN CALCIUM 40 MG/1
40 TABLET, FILM COATED ORAL DAILY
Status: DISCONTINUED | OUTPATIENT
Start: 2023-11-21 | End: 2023-11-26 | Stop reason: HOSPADM

## 2023-11-21 RX ORDER — ALLOPURINOL 300 MG/1
300 TABLET ORAL DAILY
Status: DISCONTINUED | OUTPATIENT
Start: 2023-11-21 | End: 2023-11-26 | Stop reason: HOSPADM

## 2023-11-21 RX ORDER — POTASSIUM CHLORIDE 750 MG/1
10 CAPSULE, EXTENDED RELEASE ORAL 2 TIMES DAILY
Status: DISCONTINUED | OUTPATIENT
Start: 2023-11-21 | End: 2023-11-26 | Stop reason: HOSPADM

## 2023-11-21 RX ORDER — 0.9 % SODIUM CHLORIDE 0.9 %
70 INTRAVENOUS SOLUTION INTRAVENOUS ONCE
Status: COMPLETED | OUTPATIENT
Start: 2023-11-21 | End: 2023-11-21

## 2023-11-21 RX ORDER — VALSARTAN 160 MG/1
160 TABLET ORAL DAILY
Status: DISCONTINUED | OUTPATIENT
Start: 2023-11-21 | End: 2023-11-26 | Stop reason: HOSPADM

## 2023-11-21 RX ORDER — MAGNESIUM SULFATE IN WATER 40 MG/ML
2000 INJECTION, SOLUTION INTRAVENOUS PRN
Status: DISCONTINUED | OUTPATIENT
Start: 2023-11-21 | End: 2023-11-26 | Stop reason: HOSPADM

## 2023-11-21 RX ORDER — ASPIRIN 81 MG/1
81 TABLET, CHEWABLE ORAL DAILY
Status: DISCONTINUED | OUTPATIENT
Start: 2023-11-21 | End: 2023-11-26 | Stop reason: HOSPADM

## 2023-11-21 RX ORDER — ONDANSETRON 2 MG/ML
4 INJECTION INTRAMUSCULAR; INTRAVENOUS EVERY 6 HOURS PRN
Status: DISCONTINUED | OUTPATIENT
Start: 2023-11-21 | End: 2023-11-26 | Stop reason: HOSPADM

## 2023-11-21 RX ORDER — SERTRALINE HYDROCHLORIDE 25 MG/1
25 TABLET, FILM COATED ORAL DAILY
Status: DISCONTINUED | OUTPATIENT
Start: 2023-11-21 | End: 2023-11-26 | Stop reason: HOSPADM

## 2023-11-21 RX ORDER — PANTOPRAZOLE SODIUM 40 MG/1
40 TABLET, DELAYED RELEASE ORAL
Status: DISCONTINUED | OUTPATIENT
Start: 2023-11-22 | End: 2023-11-26 | Stop reason: HOSPADM

## 2023-11-21 RX ORDER — VITAMIN B COMPLEX
1000 TABLET ORAL DAILY
Status: DISCONTINUED | OUTPATIENT
Start: 2023-11-21 | End: 2023-11-26 | Stop reason: HOSPADM

## 2023-11-21 RX ORDER — FUROSEMIDE 10 MG/ML
40 INJECTION INTRAMUSCULAR; INTRAVENOUS 2 TIMES DAILY
Status: DISCONTINUED | OUTPATIENT
Start: 2023-11-21 | End: 2023-11-25

## 2023-11-21 RX ORDER — CARVEDILOL 3.12 MG/1
3.12 TABLET ORAL 2 TIMES DAILY WITH MEALS
Status: DISCONTINUED | OUTPATIENT
Start: 2023-11-22 | End: 2023-11-23

## 2023-11-21 RX ADMIN — POTASSIUM CHLORIDE 10 MEQ: 10 CAPSULE, COATED, EXTENDED RELEASE ORAL at 20:18

## 2023-11-21 RX ADMIN — ATORVASTATIN CALCIUM 40 MG: 40 TABLET, FILM COATED ORAL at 18:30

## 2023-11-21 RX ADMIN — OXYCODONE HYDROCHLORIDE AND ACETAMINOPHEN 500 MG: 500 TABLET ORAL at 20:18

## 2023-11-21 RX ADMIN — VALSARTAN 160 MG: 160 TABLET, FILM COATED ORAL at 18:28

## 2023-11-21 RX ADMIN — APIXABAN 2.5 MG: 5 TABLET, FILM COATED ORAL at 15:05

## 2023-11-21 RX ADMIN — SODIUM CHLORIDE, PRESERVATIVE FREE 10 ML: 5 INJECTION INTRAVENOUS at 12:04

## 2023-11-21 RX ADMIN — ASPIRIN 81 MG CHEWABLE TABLET 81 MG: 81 TABLET CHEWABLE at 18:29

## 2023-11-21 RX ADMIN — IOPAMIDOL 75 ML: 755 INJECTION, SOLUTION INTRAVENOUS at 11:49

## 2023-11-21 RX ADMIN — APIXABAN 2.5 MG: 2.5 TABLET, FILM COATED ORAL at 20:18

## 2023-11-21 RX ADMIN — FUROSEMIDE 40 MG: 10 INJECTION, SOLUTION INTRAMUSCULAR; INTRAVENOUS at 18:30

## 2023-11-21 RX ADMIN — SERTRALINE 25 MG: 25 TABLET, FILM COATED ORAL at 20:18

## 2023-11-21 RX ADMIN — FUROSEMIDE 40 MG: 10 INJECTION, SOLUTION INTRAMUSCULAR; INTRAVENOUS at 10:59

## 2023-11-21 RX ADMIN — RIVASTIGMINE TARTRATE 6 MG: 1.5 CAPSULE ORAL at 20:18

## 2023-11-21 RX ADMIN — SODIUM CHLORIDE 70 ML: 9 INJECTION, SOLUTION INTRAVENOUS at 12:04

## 2023-11-21 RX ADMIN — SODIUM CHLORIDE, PRESERVATIVE FREE 10 ML: 5 INJECTION INTRAVENOUS at 20:27

## 2023-11-21 ASSESSMENT — ENCOUNTER SYMPTOMS
SHORTNESS OF BREATH: 1
CONSTIPATION: 0
VOMITING: 0
SINUS PAIN: 0
NAUSEA: 0
COUGH: 1
WHEEZING: 0
ABDOMINAL PAIN: 0
SINUS PRESSURE: 0
DIARRHEA: 0
PHOTOPHOBIA: 0

## 2023-11-21 NOTE — H&P
[facility]  Office: [address], DO, Marlene Osuna, DO, Soniya Tovar, DO, Dakota Monae, DO, Heladio Pinzon MD, Paul Magaña MD, Carmencita Verde MD, Alley Mercado MD,  Sydney Joshi MD, Dayday Arnold MD, Kvng Geiger MD,  Beatriz Zhang MD, Eduar Ng MD, Raine Block DO, Destini Larson MD,  Eb Quinones DO, Jose Guadalupe Roberson MD, Erick Rucker MD, Frank Shelby MD, Cecy Alcocer MD,  Jason Gonzalez MD, Yuri Rodríguez MD, Michelle Esqueda MD, Marilee Mcrae MD, Rigoberto Yip MD, Marisa Galvez, DO, Juan Sexton, DO, Ivan Young MD,  Amanda Abreu MD, Arturo Palumbo, CNP,  Nelida Rose, CNP, Linda Sarabia, CNP,  Izabella Tong, [facility], Alysha Cruz, CNP, Kaushik Motley, CNP, Radha Nj, CNP, Selene Levy, CNP, Pooja Contreras, CNP, Robyn Chester, [facility], Nico Mcmillan, CNP, Angie Villela, CNS, Skylar Greco, CNP, [facility], [address] [place] [address]    HISTORY AND PHYSICAL EXAMINATION            Date:   11/21/2023  Patient name:  Jacey Pinzon  Date of admission:  11/21/2023 10:09 AM  MRN:   [id number]  Account:  [de-identified]  YOB: 1935  PCP:    Tonie Mejia MD  Room:   [facility]9775-  Code Status:    Full Code    Chief Complaint:     Chief Complaint   Patient presents with    Shortness of Breath     Pt presents to ED with complaints of SOB. Pt has reportedly been having SOB following being given the flu shot last week. History Obtained From:     patient    History of Present Illness:     Jacey Pinzon is a 82 y.o. Non- / non  male who presents with Shortness of Breath (Pt presents to ED with complaints of SOB. Pt has reportedly been having SOB following being given the flu shot last week. 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     Patient received his flu

## 2023-11-21 NOTE — ED PROVIDER NOTES
[facility]  [address]  Phone: [phone]  EMERGENCY DEPARTMENT ENCOUNTER      Pt Name: Lilian Saucedo  MRN: [id number]  [address] [facility], [place] 1935  Date of evaluation: 11/21/2023    CHIEF COMPLAINT       Chief Complaint   Patient presents with    Shortness of Breath     Pt presents to ED with complaints of SOB. Pt has reportedly been having SOB following being given the flu shot last week. HISTORY OF PRESENT ILLNESS    Lilian Saucedo is a 82 y.o. male who presents to the emergency department with worsening shortness of breath over the past couple of weeks. No fevers or chills no chest pain. He was given a flu shot 2 weeks ago and his symptoms started just after that. Have a remote history of atrial fibrillation but has not been in A-fib since his cardioversion. He does present in A-fib however today. He does have some lower extremity swelling that is getting worse. He denies any other symptoms with exception of wheezing.     REVIEW OF SYSTEMS       Constitutional: No fevers or chills   HENT: No sore throat, rhinorrhea, or earache   Eyes: No blurry vision or double vision no drainage   Cardiovascular: No chest pain or tachycardia   Respiratory: Positive wheezing and shortness of breath  Gastrointestinal: No nausea, vomiting, diarrhea, constipation, or abdominal pain   : No hematuria or dysuria   Musculoskeletal: Positive lower extremity swelling no pain  Skin: No rash   Neurological: No focal neurologic complaints, paresthesias, weakness, or headache     PAST MEDICAL HISTORY    has a past medical history of Abnormal EKG, Anesthesia complication, Atrial fibrillation ([address]), Cancer ([address]), Colon cancer ([address]), COVID-19, Diabetes mellitus ([address]), Dribbling urine, Forgetfulness, Gout, H/O cataract, H/O migraine, H/O prostate cancer, Hematuria, HTN (hypertension), Hyperlipidemia, Kidney stones, SUKUMAR (obstructive sleep apnea), Renal insufficiency, Under care of team, Under care of team,

## 2023-11-21 NOTE — ED NOTES
LOTTIE Vincent  NP AT [address] [place], [place] [address]IST SPEAKING TO DR Lisa Taylor, RN  11/21/23 0936

## 2023-11-21 NOTE — ED NOTES
HOUSE SUPERVISOR CALLED MISHA. UPDATED ON 90 MIN ETA OF SIS [place].      Paula Lester RN  11/21/23 7708

## 2023-11-21 NOTE — ED NOTES
CALLED DR KOVACS' OFFICE,  DR KOVACS OFF THIS WEEK.  WILL DISCUSS CASE WITH WITH DR Tyson Ramsey, Dudley Brar RN  11/21/23 0276

## 2023-11-21 NOTE — ED NOTES
[facility] RN - [facility] FOR CARDIOLOGY CONSULT DX AFIB / CHF     Amari Liu, RIKKI  11/21/23 6359

## 2023-11-22 ENCOUNTER — APPOINTMENT (OUTPATIENT)
Dept: GENERAL RADIOLOGY | Age: 88
DRG: 291 | End: 2023-11-22
Payer: MEDICARE

## 2023-11-22 ENCOUNTER — APPOINTMENT (OUTPATIENT)
Age: 88
DRG: 291 | End: 2023-11-22
Payer: MEDICARE

## 2023-11-22 LAB
ANION GAP SERPL CALCULATED.3IONS-SCNC: 10 MMOL/L (ref 9–17)
BASOPHILS # BLD: 0.05 K/UL (ref 0–0.2)
BASOPHILS NFR BLD: 1 % (ref 0–2)
BNP SERPL-MCNC: 4116 PG/ML
BUN SERPL-MCNC: 28 MG/DL (ref 8–23)
BUN/CREAT SERPL: 15 (ref 9–20)
CALCIUM SERPL-MCNC: 9.1 MG/DL (ref 8.6–10.4)
CHLORIDE SERPL-SCNC: 105 MMOL/L (ref 98–107)
CHOLEST SERPL-MCNC: 75 MG/DL (ref 0–199)
CHOLESTEROL/HDL RATIO: 3
CO2 SERPL-SCNC: 26 MMOL/L (ref 20–31)
CREAT SERPL-MCNC: 1.9 MG/DL (ref 0.7–1.2)
ECHO AO ROOT DIAM: 3.6 CM
ECHO AO ROOT INDEX: 1.82 CM/M2
ECHO AV AREA PEAK VELOCITY: 1.5 CM2
ECHO AV AREA VTI: 1.5 CM2
ECHO AV AREA/BSA PEAK VELOCITY: 0.8 CM2/M2
ECHO AV AREA/BSA VTI: 0.8 CM2/M2
ECHO AV MEAN GRADIENT: 12 MMHG
ECHO AV MEAN VELOCITY: 1.6 M/S
ECHO AV PEAK GRADIENT: 23 MMHG
ECHO AV PEAK VELOCITY: 2.4 M/S
ECHO AV VELOCITY RATIO: 0.46
ECHO AV VTI: 55.2 CM
ECHO BSA: 2.07 M2
ECHO EST RA PRESSURE: 3 MMHG
ECHO LA AREA 4C: 36.7 CM2
ECHO LA DIAMETER INDEX: 2.32 CM/M2
ECHO LA DIAMETER: 4.6 CM
ECHO LA MAJOR AXIS: 7.3 CM
ECHO LA TO AORTIC ROOT RATIO: 1.28
ECHO LA VOL MOD A4C: 144 ML (ref 18–58)
ECHO LA VOLUME INDEX MOD A4C: 73 ML/M2 (ref 16–34)
ECHO LV E' LATERAL VELOCITY: 11 CM/S
ECHO LV E' SEPTAL VELOCITY: 6 CM/S
ECHO LV FRACTIONAL SHORTENING: 36 % (ref 28–44)
ECHO LV INTERNAL DIMENSION DIASTOLE INDEX: 2.12 CM/M2
ECHO LV INTERNAL DIMENSION DIASTOLIC: 4.2 CM (ref 4.2–5.9)
ECHO LV INTERNAL DIMENSION SYSTOLIC INDEX: 1.36 CM/M2
ECHO LV INTERNAL DIMENSION SYSTOLIC: 2.7 CM
ECHO LV IVSD: 1.3 CM (ref 0.6–1)
ECHO LV MASS 2D: 200.6 G (ref 88–224)
ECHO LV MASS INDEX 2D: 101.3 G/M2 (ref 49–115)
ECHO LV POSTERIOR WALL DIASTOLIC: 1.3 CM (ref 0.6–1)
ECHO LV RELATIVE WALL THICKNESS RATIO: 0.62
ECHO LVOT AREA: 3.5 CM2
ECHO LVOT AV VTI INDEX: 0.43
ECHO LVOT DIAM: 2.1 CM
ECHO LVOT MEAN GRADIENT: 2 MMHG
ECHO LVOT PEAK GRADIENT: 5 MMHG
ECHO LVOT PEAK VELOCITY: 1.1 M/S
ECHO LVOT STROKE VOLUME INDEX: 41.8 ML/M2
ECHO LVOT SV: 82.7 ML
ECHO LVOT VTI: 23.9 CM
ECHO MV A VELOCITY: 0.25 M/S
ECHO MV E DECELERATION TIME (DT): 232 MS
ECHO MV E VELOCITY: 1.19 M/S
ECHO MV E/A RATIO: 4.76
ECHO MV E/E' LATERAL: 10.82
ECHO MV E/E' RATIO (AVERAGED): 15.33
ECHO RIGHT VENTRICULAR SYSTOLIC PRESSURE (RVSP): 23 MMHG
ECHO TV REGURGITANT MAX VELOCITY: 2.25 M/S
ECHO TV REGURGITANT PEAK GRADIENT: 20 MMHG
EKG ATRIAL RATE: 58 BPM
EKG Q-T INTERVAL: 536 MS
EKG QRS DURATION: 128 MS
EKG QTC CALCULATION (BAZETT): 512 MS
EKG R AXIS: -35 DEGREES
EKG T AXIS: -154 DEGREES
EKG VENTRICULAR RATE: 55 BPM
EOSINOPHIL # BLD: 0.21 K/UL (ref 0–0.44)
EOSINOPHILS RELATIVE PERCENT: 3 % (ref 1–4)
ERYTHROCYTE [DISTWIDTH] IN BLOOD BY AUTOMATED COUNT: 15.2 % (ref 11.8–14.4)
EST. AVERAGE GLUCOSE BLD GHB EST-MCNC: 134 MG/DL
GFR SERPL CREATININE-BSD FRML MDRD: 34 ML/MIN/1.73M2
GLUCOSE SERPL-MCNC: 121 MG/DL (ref 70–99)
HBA1C MFR BLD: 6.3 % (ref 4–6)
HCT VFR BLD AUTO: 32.1 % (ref 40.7–50.3)
HDLC SERPL-MCNC: 26 MG/DL
HGB BLD-MCNC: 10.2 G/DL (ref 13–17)
IMM GRANULOCYTES # BLD AUTO: 0.02 K/UL (ref 0–0.3)
IMM GRANULOCYTES NFR BLD: 0 %
LDLC SERPL CALC-MCNC: 30 MG/DL (ref 0–100)
LYMPHOCYTES NFR BLD: 3.7 K/UL (ref 1.1–3.7)
LYMPHOCYTES RELATIVE PERCENT: 58 % (ref 24–43)
MAGNESIUM SERPL-MCNC: 1.8 MG/DL (ref 1.6–2.6)
MCH RBC QN AUTO: 33.8 PG (ref 25.2–33.5)
MCHC RBC AUTO-ENTMCNC: 31.8 G/DL (ref 28.4–34.8)
MCV RBC AUTO: 106.3 FL (ref 82.6–102.9)
MONOCYTES NFR BLD: 0.52 K/UL (ref 0.1–1.2)
MONOCYTES NFR BLD: 8 % (ref 3–12)
NEUTROPHILS NFR BLD: 30 % (ref 36–65)
NEUTS SEG NFR BLD: 1.96 K/UL (ref 1.5–8.1)
NRBC BLD-RTO: 0 PER 100 WBC
PLATELET # BLD AUTO: 212 K/UL (ref 138–453)
PMV BLD AUTO: 10.2 FL (ref 8.1–13.5)
POTASSIUM SERPL-SCNC: 3.7 MMOL/L (ref 3.7–5.3)
RBC # BLD AUTO: 3.02 M/UL (ref 4.21–5.77)
RBC # BLD: ABNORMAL 10*6/UL
RBC # BLD: ABNORMAL 10*6/UL
SODIUM SERPL-SCNC: 141 MMOL/L (ref 135–144)
TRIGL SERPL-MCNC: 94 MG/DL (ref 0–149)
TROPONIN I SERPL HS-MCNC: 54 NG/L (ref 0–22)
TSH SERPL DL<=0.05 MIU/L-ACNC: 1.84 UIU/ML (ref 0.3–5)
VLDLC SERPL CALC-MCNC: 19 MG/DL
WBC OTHER # BLD: 6.5 K/UL (ref 3.5–11.3)

## 2023-11-22 PROCEDURE — 80061 LIPID PANEL: CPT

## 2023-11-22 PROCEDURE — 84484 ASSAY OF TROPONIN QUANT: CPT

## 2023-11-22 PROCEDURE — 83036 HEMOGLOBIN GLYCOSYLATED A1C: CPT

## 2023-11-22 PROCEDURE — 36415 COLL VENOUS BLD VENIPUNCTURE: CPT

## 2023-11-22 PROCEDURE — 6370000000 HC RX 637 (ALT 250 FOR IP): Performed by: NURSE PRACTITIONER

## 2023-11-22 PROCEDURE — 84443 ASSAY THYROID STIM HORMONE: CPT

## 2023-11-22 PROCEDURE — 80048 BASIC METABOLIC PNL TOTAL CA: CPT

## 2023-11-22 PROCEDURE — 97530 THERAPEUTIC ACTIVITIES: CPT

## 2023-11-22 PROCEDURE — 51798 US URINE CAPACITY MEASURE: CPT

## 2023-11-22 PROCEDURE — 99232 SBSQ HOSP IP/OBS MODERATE 35: CPT | Performed by: NURSE PRACTITIONER

## 2023-11-22 PROCEDURE — 83880 ASSAY OF NATRIURETIC PEPTIDE: CPT

## 2023-11-22 PROCEDURE — 97116 GAIT TRAINING THERAPY: CPT

## 2023-11-22 PROCEDURE — 71045 X-RAY EXAM CHEST 1 VIEW: CPT

## 2023-11-22 PROCEDURE — 83735 ASSAY OF MAGNESIUM: CPT

## 2023-11-22 PROCEDURE — 93306 TTE W/DOPPLER COMPLETE: CPT

## 2023-11-22 PROCEDURE — 93005 ELECTROCARDIOGRAM TRACING: CPT | Performed by: INTERNAL MEDICINE

## 2023-11-22 PROCEDURE — 97162 PT EVAL MOD COMPLEX 30 MIN: CPT

## 2023-11-22 PROCEDURE — 6360000002 HC RX W HCPCS: Performed by: NURSE PRACTITIONER

## 2023-11-22 PROCEDURE — 2580000003 HC RX 258: Performed by: NURSE PRACTITIONER

## 2023-11-22 PROCEDURE — 84153 ASSAY OF PSA TOTAL: CPT

## 2023-11-22 PROCEDURE — 85025 COMPLETE CBC W/AUTO DIFF WBC: CPT

## 2023-11-22 PROCEDURE — 2060000000 HC ICU INTERMEDIATE R&B

## 2023-11-22 RX ORDER — TAMSULOSIN HYDROCHLORIDE 0.4 MG/1
0.4 CAPSULE ORAL DAILY
Status: DISCONTINUED | OUTPATIENT
Start: 2023-11-22 | End: 2023-11-26 | Stop reason: HOSPADM

## 2023-11-22 RX ADMIN — OXYCODONE HYDROCHLORIDE AND ACETAMINOPHEN 500 MG: 500 TABLET ORAL at 19:47

## 2023-11-22 RX ADMIN — POTASSIUM CHLORIDE 10 MEQ: 10 CAPSULE, COATED, EXTENDED RELEASE ORAL at 08:41

## 2023-11-22 RX ADMIN — SODIUM CHLORIDE, PRESERVATIVE FREE 10 ML: 5 INJECTION INTRAVENOUS at 19:47

## 2023-11-22 RX ADMIN — ALLOPURINOL 300 MG: 300 TABLET ORAL at 08:41

## 2023-11-22 RX ADMIN — AMLODIPINE BESYLATE 10 MG: 10 TABLET ORAL at 08:40

## 2023-11-22 RX ADMIN — Medication 1000 UNITS: at 08:40

## 2023-11-22 RX ADMIN — ATORVASTATIN CALCIUM 40 MG: 40 TABLET, FILM COATED ORAL at 19:46

## 2023-11-22 RX ADMIN — EMPAGLIFLOZIN 10 MG: 10 TABLET, FILM COATED ORAL at 08:41

## 2023-11-22 RX ADMIN — SODIUM CHLORIDE, PRESERVATIVE FREE 10 ML: 5 INJECTION INTRAVENOUS at 08:42

## 2023-11-22 RX ADMIN — FUROSEMIDE 40 MG: 10 INJECTION, SOLUTION INTRAMUSCULAR; INTRAVENOUS at 08:41

## 2023-11-22 RX ADMIN — POTASSIUM CHLORIDE 10 MEQ: 10 CAPSULE, COATED, EXTENDED RELEASE ORAL at 19:46

## 2023-11-22 RX ADMIN — RIVASTIGMINE TARTRATE 6 MG: 1.5 CAPSULE ORAL at 19:46

## 2023-11-22 RX ADMIN — APIXABAN 2.5 MG: 2.5 TABLET, FILM COATED ORAL at 08:41

## 2023-11-22 RX ADMIN — CARVEDILOL 3.12 MG: 3.12 TABLET, FILM COATED ORAL at 08:40

## 2023-11-22 RX ADMIN — RIVASTIGMINE TARTRATE 6 MG: 1.5 CAPSULE ORAL at 08:40

## 2023-11-22 RX ADMIN — SERTRALINE 25 MG: 25 TABLET, FILM COATED ORAL at 19:46

## 2023-11-22 RX ADMIN — PANTOPRAZOLE SODIUM 40 MG: 40 TABLET, DELAYED RELEASE ORAL at 05:04

## 2023-11-22 RX ADMIN — OXYCODONE HYDROCHLORIDE AND ACETAMINOPHEN 500 MG: 500 TABLET ORAL at 08:40

## 2023-11-22 RX ADMIN — APIXABAN 2.5 MG: 2.5 TABLET, FILM COATED ORAL at 19:46

## 2023-11-22 RX ADMIN — TAMSULOSIN HYDROCHLORIDE 0.4 MG: 0.4 CAPSULE ORAL at 10:18

## 2023-11-22 NOTE — FLOWSHEET NOTE
11/22/23 1658   Urine Assessment   Bladder Scan Volume (mL) 0 mL   Unmeasured Output   Urine Occurrence 1     Pt had an unmeasured urine occurrence while working with therapy. PVR 0 ml.

## 2023-11-22 NOTE — FLOWSHEET NOTE
11/22/23 1231   External Urinary Catheter   Placement Date/Time: 11/21/23 2333     Output (mL) 650 mL   Urine Assessment   Bladder Scan Volume (mL) 0 mL        Pt voided 650 ml. BS showed 0 ml retained.

## 2023-11-22 NOTE — CARE COORDINATION
Case Management Assessment  Initial Evaluation    Date/Time of Evaluation: 11/22/2023 1:32 PM  Assessment Completed by: Angella Miller RN    If patient is discharged prior to next notation, then this note serves as note for discharge by case management. Patient Name: Lilian Saucedo                   YOB: 1935  Diagnosis: Chronic atrial fibrillation ([address]) [I48.20]  Acute on chronic heart failure, unspecified heart failure type ([address]) [I50.9]  Acute congestive heart failure, unspecified heart failure type ([address]) [I50.9]                   Date / Time: 11/21/2023 10:09 AM    Patient Admission Status: Inpatient   Readmission Risk (Low < 19, Mod (19-27), High > 27): Readmission Risk Score: 15.1    Current PCP: Olivia Valdovinos MD  PCP verified by CM? Yes    Chart Reviewed: Yes      History Provided by: Patient  Patient Orientation: Alert and Oriented    Patient Cognition: Alert    Hospitalization in the last 30 days (Readmission):  No    If yes, Readmission Assessment in  Navigator will be completed. Advance Directives:      Code Status: Full Code   Patient's Primary Decision Maker is: Legal Next of Kin      Discharge Planning:    Patient lives with: Spouse/Significant Other Type of Home: Independent Living ( at [facility])  Primary Care Giver: Self  Patient Support Systems include: Spouse/Significant Other   Current Financial resources: Medicare  Current community resources: ECF/Home Care  Current services prior to admission: Durable Medical Equipment, C-pap            Current DME: Jesus Bateman Shower Chair            Type of Home Care services:  OT, PT, Nursing Services    ADLS  Prior functional level: Independent in ADLs/IADLs  Current functional level: Assistance with the following:, Cooking, Housework    PT AM-PAC:   /24  OT AM-PAC:   /24    Family can provide assistance at DC:  Yes  Would you like Case Management to discuss the discharge plan with any other family members/significant others, and if so,

## 2023-11-22 NOTE — PLAN OF CARE
Pt remains safe and free from falls thus far in shift  3L NC, sat mid 90's  IV lasix BID  No noted skin issues  Consulted urology, bladder scan PVR  Diovan held for neptali  ECHO completed, see chart for results  Afib on cardiac monitor, rate controlled  Discharge plan in progress  Bed locked and lowest position  Bed alarm on for safety  Call light in reach   Care ongoing    Problem: Discharge Planning  Goal: Discharge to home or other facility with appropriate resources  11/22/2023 0942 by Andrea Underwood RN  Outcome: Progressing     Problem: Safety - Adult  Goal: Free from fall injury  11/22/2023 0942 by Andrea Underwood RN  Outcome: Progressing     Problem: ABCDS Injury Assessment  Goal: Absence of physical injury  11/22/2023 0942 by Andrea Underwood RN  Outcome: Progressing     Problem: Chronic Conditions and Co-morbidities  Goal: Patient's chronic conditions and co-morbidity symptoms are monitored and maintained or improved  11/22/2023 0942 by Andrea Underwood RN  Outcome: Progressing     Problem: Skin/Tissue Integrity  Goal: Absence of new skin breakdown  Description: 1. Monitor for areas of redness and/or skin breakdown  2. Assess vascular access sites hourly  3. Every 4-6 hours minimum:  Change oxygen saturation probe site  4. Every 4-6 hours:  If on nasal continuous positive airway pressure, respiratory therapy assess nares and determine need for appliance change or resting period.   11/22/2023 6479 by Andrea Underwood RN  Outcome: Progressing     Problem: Respiratory - Adult  Goal: Achieves optimal ventilation and oxygenation  Outcome: Progressing     Problem: Cardiovascular - Adult  Goal: Maintains optimal cardiac output and hemodynamic stability  Outcome: Progressing     Problem: Cardiovascular - Adult  Goal: Absence of cardiac dysrhythmias or at baseline  Outcome: Progressing     Problem: Skin/Tissue Integrity - Adult  Goal: Skin integrity remains intact  Outcome: Progressing     Problem: Metabolic/Fluid and

## 2023-11-22 NOTE — PLAN OF CARE
Patient A & O x4 and ambulates with walker, external cath in place at night  IV Lasix BID output WDL  Sinus roberta, cardiology aware  5L per NC, continuous pulse ox in place, O2 WDL  Vitals WDL  Care ongoing      Problem: Discharge Planning  Goal: Discharge to home or other facility with appropriate resources  Outcome: Progressing     Problem: Safety - Adult  Goal: Free from fall injury  Outcome: Progressing     Problem: ABCDS Injury Assessment  Goal: Absence of physical injury  Outcome: Progressing

## 2023-11-23 ENCOUNTER — APPOINTMENT (OUTPATIENT)
Dept: CT IMAGING | Age: 88
DRG: 291 | End: 2023-11-23
Payer: MEDICARE

## 2023-11-23 LAB
ANION GAP SERPL CALCULATED.3IONS-SCNC: 15 MMOL/L (ref 9–17)
BUN SERPL-MCNC: 39 MG/DL (ref 8–23)
BUN/CREAT SERPL: 16 (ref 9–20)
CALCIUM SERPL-MCNC: 9 MG/DL (ref 8.6–10.4)
CHLORIDE SERPL-SCNC: 103 MMOL/L (ref 98–107)
CO2 SERPL-SCNC: 25 MMOL/L (ref 20–31)
CREAT SERPL-MCNC: 2.4 MG/DL (ref 0.7–1.2)
GFR SERPL CREATININE-BSD FRML MDRD: 26 ML/MIN/1.73M2
GLUCOSE SERPL-MCNC: 132 MG/DL (ref 70–99)
MAGNESIUM SERPL-MCNC: 1.8 MG/DL (ref 1.6–2.6)
POTASSIUM SERPL-SCNC: 3.9 MMOL/L (ref 3.7–5.3)
PSA SERPL-MCNC: <0.02 NG/ML
PSA SERPL-MCNC: <0.03 NG/ML (ref 0–4)
SODIUM SERPL-SCNC: 143 MMOL/L (ref 135–144)
TROPONIN I SERPL HS-MCNC: 57 NG/L (ref 0–22)

## 2023-11-23 PROCEDURE — 99232 SBSQ HOSP IP/OBS MODERATE 35: CPT | Performed by: NURSE PRACTITIONER

## 2023-11-23 PROCEDURE — 80048 BASIC METABOLIC PNL TOTAL CA: CPT

## 2023-11-23 PROCEDURE — 6370000000 HC RX 637 (ALT 250 FOR IP): Performed by: NURSE PRACTITIONER

## 2023-11-23 PROCEDURE — 2580000003 HC RX 258: Performed by: NURSE PRACTITIONER

## 2023-11-23 PROCEDURE — 2060000000 HC ICU INTERMEDIATE R&B

## 2023-11-23 PROCEDURE — 2700000000 HC OXYGEN THERAPY PER DAY

## 2023-11-23 PROCEDURE — 51798 US URINE CAPACITY MEASURE: CPT

## 2023-11-23 PROCEDURE — 84484 ASSAY OF TROPONIN QUANT: CPT

## 2023-11-23 PROCEDURE — 94761 N-INVAS EAR/PLS OXIMETRY MLT: CPT

## 2023-11-23 PROCEDURE — 84153 ASSAY OF PSA TOTAL: CPT

## 2023-11-23 PROCEDURE — 36415 COLL VENOUS BLD VENIPUNCTURE: CPT

## 2023-11-23 PROCEDURE — 93005 ELECTROCARDIOGRAM TRACING: CPT | Performed by: INTERNAL MEDICINE

## 2023-11-23 PROCEDURE — 83735 ASSAY OF MAGNESIUM: CPT

## 2023-11-23 PROCEDURE — 74176 CT ABD & PELVIS W/O CONTRAST: CPT

## 2023-11-23 RX ADMIN — APIXABAN 2.5 MG: 2.5 TABLET, FILM COATED ORAL at 10:29

## 2023-11-23 RX ADMIN — TAMSULOSIN HYDROCHLORIDE 0.4 MG: 0.4 CAPSULE ORAL at 10:29

## 2023-11-23 RX ADMIN — FERROUS SULFATE TAB EC 325 MG (65 MG FE EQUIVALENT) 325 MG: 325 (65 FE) TABLET DELAYED RESPONSE at 14:10

## 2023-11-23 RX ADMIN — RIVASTIGMINE TARTRATE 6 MG: 1.5 CAPSULE ORAL at 21:48

## 2023-11-23 RX ADMIN — SERTRALINE 25 MG: 25 TABLET, FILM COATED ORAL at 21:47

## 2023-11-23 RX ADMIN — AMLODIPINE BESYLATE 10 MG: 10 TABLET ORAL at 10:28

## 2023-11-23 RX ADMIN — POTASSIUM CHLORIDE 10 MEQ: 10 CAPSULE, COATED, EXTENDED RELEASE ORAL at 10:29

## 2023-11-23 RX ADMIN — OXYCODONE HYDROCHLORIDE AND ACETAMINOPHEN 500 MG: 500 TABLET ORAL at 21:47

## 2023-11-23 RX ADMIN — PANTOPRAZOLE SODIUM 40 MG: 40 TABLET, DELAYED RELEASE ORAL at 05:01

## 2023-11-23 RX ADMIN — EMPAGLIFLOZIN 10 MG: 10 TABLET, FILM COATED ORAL at 10:29

## 2023-11-23 RX ADMIN — Medication 1000 UNITS: at 10:28

## 2023-11-23 RX ADMIN — SODIUM CHLORIDE, PRESERVATIVE FREE 10 ML: 5 INJECTION INTRAVENOUS at 10:29

## 2023-11-23 RX ADMIN — ATORVASTATIN CALCIUM 40 MG: 40 TABLET, FILM COATED ORAL at 21:47

## 2023-11-23 RX ADMIN — OXYCODONE HYDROCHLORIDE AND ACETAMINOPHEN 500 MG: 500 TABLET ORAL at 10:29

## 2023-11-23 RX ADMIN — RIVASTIGMINE TARTRATE 6 MG: 1.5 CAPSULE ORAL at 10:29

## 2023-11-23 RX ADMIN — ALLOPURINOL 300 MG: 300 TABLET ORAL at 10:29

## 2023-11-23 RX ADMIN — CARVEDILOL 3.12 MG: 3.12 TABLET, FILM COATED ORAL at 10:29

## 2023-11-23 RX ADMIN — APIXABAN 2.5 MG: 2.5 TABLET, FILM COATED ORAL at 21:47

## 2023-11-23 RX ADMIN — POTASSIUM CHLORIDE 10 MEQ: 10 CAPSULE, COATED, EXTENDED RELEASE ORAL at 21:47

## 2023-11-23 ASSESSMENT — ENCOUNTER SYMPTOMS
VOMITING: 0
CONSTIPATION: 0
COUGH: 0
CHEST TIGHTNESS: 0
DIARRHEA: 0
ABDOMINAL PAIN: 0
NAUSEA: 0
SHORTNESS OF BREATH: 0

## 2023-11-23 NOTE — PLAN OF CARE
Patient currently resting in bed. Pt a/o x4. Pt up 1 assist with walker. Pt weaned down to 1L NC with cont pulse ox in place. PVR bladder scans done. CT abd pelvis done per urology. IV lasix held this shift d/t neptali. Pt remains afib on tele with HR 40-60's. Pt ambulated up/down to the lobby. Pt denies any pain. Safety maintained and call light placed within reach. Problem: Discharge Planning  Goal: Discharge to home or other facility with appropriate resources  11/23/2023 1618 by Miguel Weiss RN  Outcome: Progressing     Problem: Safety - Adult  Goal: Free from fall injury  11/23/2023 1618 by Miguel Weiss RN  Outcome: Progressing     Problem: Chronic Conditions and Co-morbidities  Goal: Patient's chronic conditions and co-morbidity symptoms are monitored and maintained or improved  11/23/2023 1618 by Miguel Weiss RN  Outcome: Progressing     Problem: Skin/Tissue Integrity  Goal: Absence of new skin breakdown  Description: 1. Monitor for areas of redness and/or skin breakdown  2. Assess vascular access sites hourly  3.   Every 4-6 hours minimum:  Change oxygen saturation probe site  11/23/2023 1618 by Miguel Weiss RN  Outcome: Progressing     Problem: Respiratory - Adult  Goal: Achieves optimal ventilation and oxygenation  11/23/2023 1618 by Miguel Weiss RN  Outcome: Progressing     Problem: Cardiovascular - Adult  Goal: Maintains optimal cardiac output and hemodynamic stability  11/23/2023 1618 by Miguel Weiss RN  Outcome: Progressing     Problem: Neurosensory - Adult  Goal: Achieves stable or improved neurological status  11/23/2023 1618 by Miguel Weiss RN  Outcome: Progressing     Problem: Skin/Tissue Integrity - Adult  Goal: Skin integrity remains intact  11/23/2023 1618 by Miguel Weiss RN  Outcome: Progressing     Problem: Genitourinary - Adult  Goal: Absence of urinary retention  11/23/2023 1618 by Miguel Weiss RN  Outcome: Progressing     Problem: Metabolic/Fluid and Electrolytes - Adult  Goal:

## 2023-11-23 NOTE — PLAN OF CARE
No acute events overnight  Vitals WDL  PVR bladder scans done when patient urinates, so far PVR has been less than 10ml  External cath in place with brief  1x with walker to bathroom  3L per NC, continuous pulse ox in place, O2 WDL  Care ongoing      Problem: Discharge Planning  Goal: Discharge to home or other facility with appropriate resources  Outcome: Progressing     Problem: Safety - Adult  Goal: Free from fall injury  Outcome: Progressing     Problem: ABCDS Injury Assessment  Goal: Absence of physical injury  Outcome: Progressing     Problem: Chronic Conditions and Co-morbidities  Goal: Patient's chronic conditions and co-morbidity symptoms are monitored and maintained or improved  Outcome: Progressing     Problem: Skin/Tissue Integrity  Goal: Absence of new skin breakdown  Description: 1.   Monitor for areas of redness and/or skin breakdown  Outcome: Progressing     Problem: Respiratory - Adult  Goal: Achieves optimal ventilation and oxygenation  Outcome: Progressing     Problem: Cardiovascular - Adult  Goal: Maintains optimal cardiac output and hemodynamic stability  Outcome: Progressing     Problem: Cardiovascular - Adult  Goal: Absence of cardiac dysrhythmias or at baseline  Outcome: Progressing     Problem: Neurosensory - Adult  Goal: Achieves stable or improved neurological status  Outcome: Progressing     Problem: Skin/Tissue Integrity - Adult  Goal: Skin integrity remains intact  Outcome: Progressing  Flowsheets (Taken 11/22/2023 2223)  Skin Integrity Remains Intact: Monitor for areas of redness and/or skin breakdown     Problem: Musculoskeletal - Adult  Goal: Return mobility to safest level of function  Outcome: Progressing     Problem: Musculoskeletal - Adult  Goal: Return ADL status to a safe level of function  Outcome: Progressing

## 2023-11-24 LAB
ANION GAP SERPL CALCULATED.3IONS-SCNC: 12 MMOL/L (ref 9–17)
BNP SERPL-MCNC: 2469 PG/ML
BUN SERPL-MCNC: 48 MG/DL (ref 8–23)
BUN/CREAT SERPL: 18 (ref 9–20)
C3 SERPL-MCNC: 102 MG/DL (ref 90–180)
C3 SERPL-MCNC: 111 MG/DL (ref 90–180)
C4 SERPL-MCNC: 24 MG/DL (ref 10–40)
C4 SERPL-MCNC: 27 MG/DL (ref 10–40)
CALCIUM SERPL-MCNC: 8.7 MG/DL (ref 8.6–10.4)
CHLORIDE SERPL-SCNC: 105 MMOL/L (ref 98–107)
CO2 SERPL-SCNC: 26 MMOL/L (ref 20–31)
CREAT SERPL-MCNC: 2.6 MG/DL (ref 0.7–1.2)
EKG Q-T INTERVAL: 570 MS
EKG QRS DURATION: 126 MS
EKG QTC CALCULATION (BAZETT): 498 MS
EKG R AXIS: -37 DEGREES
EKG T AXIS: -10 DEGREES
EKG VENTRICULAR RATE: 46 BPM
FREE KAPPA/LAMBDA RATIO: 1.91 (ref 0.26–1.65)
GFR SERPL CREATININE-BSD FRML MDRD: 23 ML/MIN/1.73M2
GLUCOSE SERPL-MCNC: 158 MG/DL (ref 70–99)
HAV IGM SERPL QL IA: NONREACTIVE
HBV CORE IGM SERPL QL IA: NONREACTIVE
HBV SURFACE AG SERPL QL IA: NONREACTIVE
HCV AB SERPL QL IA: NONREACTIVE
KAPPA LC FREE SER-MCNC: 52.6 MG/L (ref 3.7–19.4)
LAMBDA LC FREE SERPL-MCNC: 27.6 MG/L (ref 5.7–26.3)
MAGNESIUM SERPL-MCNC: 1.9 MG/DL (ref 1.6–2.6)
OSMOLALITY SERPL: 317 MOSM/KG (ref 275–295)
POTASSIUM SERPL-SCNC: 4.1 MMOL/L (ref 3.7–5.3)
SODIUM SERPL-SCNC: 143 MMOL/L (ref 135–144)

## 2023-11-24 PROCEDURE — 83880 ASSAY OF NATRIURETIC PEPTIDE: CPT

## 2023-11-24 PROCEDURE — 83735 ASSAY OF MAGNESIUM: CPT

## 2023-11-24 PROCEDURE — 86038 ANTINUCLEAR ANTIBODIES: CPT

## 2023-11-24 PROCEDURE — 86334 IMMUNOFIX E-PHORESIS SERUM: CPT

## 2023-11-24 PROCEDURE — 2060000000 HC ICU INTERMEDIATE R&B

## 2023-11-24 PROCEDURE — 83521 IG LIGHT CHAINS FREE EACH: CPT

## 2023-11-24 PROCEDURE — 86160 COMPLEMENT ANTIGEN: CPT

## 2023-11-24 PROCEDURE — 2700000000 HC OXYGEN THERAPY PER DAY

## 2023-11-24 PROCEDURE — 80074 ACUTE HEPATITIS PANEL: CPT

## 2023-11-24 PROCEDURE — 84165 PROTEIN E-PHORESIS SERUM: CPT

## 2023-11-24 PROCEDURE — 94761 N-INVAS EAR/PLS OXIMETRY MLT: CPT

## 2023-11-24 PROCEDURE — 83930 ASSAY OF BLOOD OSMOLALITY: CPT

## 2023-11-24 PROCEDURE — 99232 SBSQ HOSP IP/OBS MODERATE 35: CPT | Performed by: NURSE PRACTITIONER

## 2023-11-24 PROCEDURE — 86225 DNA ANTIBODY NATIVE: CPT

## 2023-11-24 PROCEDURE — 80048 BASIC METABOLIC PNL TOTAL CA: CPT

## 2023-11-24 PROCEDURE — 2580000003 HC RX 258: Performed by: NURSE PRACTITIONER

## 2023-11-24 PROCEDURE — 6370000000 HC RX 637 (ALT 250 FOR IP): Performed by: NURSE PRACTITIONER

## 2023-11-24 PROCEDURE — 84155 ASSAY OF PROTEIN SERUM: CPT

## 2023-11-24 PROCEDURE — 36415 COLL VENOUS BLD VENIPUNCTURE: CPT

## 2023-11-24 RX ADMIN — OXYCODONE HYDROCHLORIDE AND ACETAMINOPHEN 500 MG: 500 TABLET ORAL at 20:29

## 2023-11-24 RX ADMIN — SODIUM CHLORIDE, PRESERVATIVE FREE 10 ML: 5 INJECTION INTRAVENOUS at 20:29

## 2023-11-24 RX ADMIN — AMLODIPINE BESYLATE 10 MG: 10 TABLET ORAL at 09:57

## 2023-11-24 RX ADMIN — ALLOPURINOL 300 MG: 300 TABLET ORAL at 09:57

## 2023-11-24 RX ADMIN — PANTOPRAZOLE SODIUM 40 MG: 40 TABLET, DELAYED RELEASE ORAL at 06:07

## 2023-11-24 RX ADMIN — RIVASTIGMINE TARTRATE 6 MG: 1.5 CAPSULE ORAL at 20:29

## 2023-11-24 RX ADMIN — EMPAGLIFLOZIN 10 MG: 10 TABLET, FILM COATED ORAL at 09:58

## 2023-11-24 RX ADMIN — POTASSIUM CHLORIDE 10 MEQ: 10 CAPSULE, COATED, EXTENDED RELEASE ORAL at 09:57

## 2023-11-24 RX ADMIN — ATORVASTATIN CALCIUM 40 MG: 40 TABLET, FILM COATED ORAL at 20:29

## 2023-11-24 RX ADMIN — SODIUM CHLORIDE, PRESERVATIVE FREE 10 ML: 5 INJECTION INTRAVENOUS at 09:59

## 2023-11-24 RX ADMIN — APIXABAN 2.5 MG: 2.5 TABLET, FILM COATED ORAL at 09:57

## 2023-11-24 RX ADMIN — RIVASTIGMINE TARTRATE 6 MG: 1.5 CAPSULE ORAL at 09:57

## 2023-11-24 RX ADMIN — APIXABAN 2.5 MG: 2.5 TABLET, FILM COATED ORAL at 20:29

## 2023-11-24 RX ADMIN — OXYCODONE HYDROCHLORIDE AND ACETAMINOPHEN 500 MG: 500 TABLET ORAL at 09:57

## 2023-11-24 RX ADMIN — TAMSULOSIN HYDROCHLORIDE 0.4 MG: 0.4 CAPSULE ORAL at 09:57

## 2023-11-24 RX ADMIN — Medication 1000 UNITS: at 09:58

## 2023-11-24 RX ADMIN — SERTRALINE 25 MG: 25 TABLET, FILM COATED ORAL at 20:29

## 2023-11-24 NOTE — CARE COORDINATION
Discharge planning    Chart reviewed. Patient lives with his wife at [facility] in [address]. He has RW and sc. He uses his rw all the time. He also has CPAP but does not use. He has [facility] that delivers his medications in a bubble pack. His wife has home 02 but unsure of the company. Message received that patient has been accepted by [facility]. Updated RYAN     Patient admitted with CHF. Cardiology following. Eliquis started for a fib . Creatine now increasing.  Was 1.6 on admission now 2.6.  currently on 1.5 L

## 2023-11-24 NOTE — CONSULTS
Ericka Donovan  Urology Consultation    Patient:  Reginaldo Nam  MRN: [id number]  YOB: 1935    CHIEF COMPLAINT: Urinary retention, prostate cancer    HISTORY OF PRESENT ILLNESS:   The patient is a 82 y.o. 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 Past Medical History:    Past Medical History:   Diagnosis Date    Abnormal EKG 12/30/2022    at [facility]    Anesthesia complication     per daughter, often has \"delerium\" post anesthesia for weeks    Atrial fibrillation [facility])     cardioverted to rsr    Cancer [facility])     colon w/ resection    Colon cancer ([address] [facility])     COVID-19     not hospitalized   had cough  3/17/2023   received antiviral and it was \"mild\" per daughter. Diabetes mellitus ([address])     pcp manages    Dribbling urine     Forgetfulness     per daughter, he forgets his meds for days at a time    Gout     H/O cataract     H/O migraine     no longer a problem    H/O prostate cancer     Hematuria     HTN (hypertension)     Hyperlipidemia     Kidney stones     SUKUMAR (obstructive sleep apnea)     has cpap but doesn't use it    Renal insufficiency     per daughter. Does not see nephrologist    Under care of team     Dr. Manolo Jimenez at [facility] 1/27/2023; used to see Dr. Chava Pacheco    Under care of team     Dr. Amish Pate at [facility] for GI    Vasovagal episode 12/29/2022    [facility] [place] ER and overnight hospitalization. Was advised to f/u w/ cardiologist. Merleen Fails were changed.     Wears glasses     Wears partial dentures     upper    Wellness examination     [facility] [place]
NSAIDs use in the past, No h/o nephrolithiasis, No recent skin rashes or arthralgias, No hematuria or pyuria noticed in the recent past. Doesn't report any reduction in the urine output recently. Non report of any obstructive urinary symptoms (urgency, frequency, weak stream, straining while urination). No h/o recurrent UTIs in the past.    Past History/Allergies? Social History:     Past Medical History:   Diagnosis Date    Abnormal EKG 12/30/2022    at [facility]    Anesthesia complication     per daughter, often has \"delerium\" post anesthesia for weeks    Atrial fibrillation [facility])     cardioverted to rsr    Cancer [facility])     colon w/ resection    Colon cancer ([address] [facility])     COVID-19     not hospitalized   had cough  3/17/2023   received antiviral and it was \"mild\" per daughter. Diabetes mellitus ([address])     pcp manages    Dribbling urine     Forgetfulness     per daughter, he forgets his meds for days at a time    Gout     H/O cataract     H/O migraine     no longer a problem    H/O prostate cancer     Hematuria     HTN (hypertension)     Hyperlipidemia     Kidney stones     SUKUMAR (obstructive sleep apnea)     has cpap but doesn't use it    Renal insufficiency     per daughter. Does not see nephrologist    Under care of team     Dr. Cuca Chacko at [facility] 1/27/2023; used to see Dr. Norman Foreman    Under care of team     Dr. Junito Sanchez at [facility] for GI    Vasovagal episode 12/29/2022    [facility] ER and overnight hospitalization. Was advised to f/u w/ cardiologist. Chang Maxon were changed.     Wears glasses     Wears partial dentures     upper    Wellness examination     [facility] geriatric clinic Dr. Katalina Ramirez last visit 1/26/2023       Past Surgical History:   Procedure Laterality Date    APPENDECTOMY      BLADDER SURGERY      SPHINTER CUFF INSERT    CARDIOVERSION N/A 04/24/2019    Dr. Norman Foreman  cardioverted a fib to RSR    CHOLECYSTECTOMY      COLONOSCOPY      ESOPHAGOGASTRODUODENOSCOPY  04/19/2023    LAPAROSCOPIC LEFT COLON RESECTION      LITHOTRIPSY
LIVER PROFILE:No results for input(s): \"AST\", \"ALT\", \"LABALBU\", \"ALKPHOS\", \"BILITOT\", \"BILIDIR\", \"IBILI\", \"PROT\", \"GLOB\", \"ALBUMIN\" in the last 72 hours. FLP:  No results found for: \"CHOL\", \"TRIG\", \"HDL\", \"LDLCHOLESTEROL\"  Uric Acid:  No results found for: \"LABURIC\", \"URICACID\"  Troponin:  No results found for: \"TROPONINI\"  Last 3 Troponin:  No results found for: \"TROPONINI\"  HgBA1c:  No results found for: \"LABA1C\"  TSH:  No results found for: \"TSH\"    ASSESSMENT/PLAN:    Atrial fibrillation. Rule out hyper or hypothyroidism. He was in the past anticoagulated with warfarin which was then discontinued after bowel mass. Agree with anticoagulation with Eliquis as no invasive procedures planned. Hold aspirin. Congestive heart failure: Get echo to evaluate systolic function and valvular disease. BNP 5000's. Myocardial injury: Elevated troponin, follow dynamics, might be secondary to heart failure, doubt acute coronary syndrome. We will get echo for wall motion abnormalities and valvular heart disease. Hyperglycemia: Being followed by others. Arterial hypertension: Increased dose of amlodipine, continue was at home and observing renal dysfunction, diurese gently, continue low-dose Coreg given bradycardic A-fib, Jardiance added, after loop diuresis will start very low-dose spironolactone/chlorthalidone. Management plan was discussed with patient. Thank you for the consultation.     Electronically signed by Nhung Ramirez MD on 11/21/2023 at 6:26 PM     CC: Jairo Hall MD

## 2023-11-25 ENCOUNTER — APPOINTMENT (OUTPATIENT)
Dept: ULTRASOUND IMAGING | Age: 88
DRG: 291 | End: 2023-11-25
Payer: MEDICARE

## 2023-11-25 LAB
ANION GAP SERPL CALCULATED.3IONS-SCNC: 11 MMOL/L (ref 9–17)
BILIRUB UR QL STRIP: NEGATIVE
BUN SERPL-MCNC: 49 MG/DL (ref 8–23)
BUN/CREAT SERPL: 22 (ref 9–20)
CALCIUM SERPL-MCNC: 9.1 MG/DL (ref 8.6–10.4)
CHLORIDE SERPL-SCNC: 106 MMOL/L (ref 98–107)
CHLORIDE UR-SCNC: <20 MMOL/L
CLARITY UR: CLEAR
CO2 SERPL-SCNC: 26 MMOL/L (ref 20–31)
COLOR UR: YELLOW
CREAT SERPL-MCNC: 2.2 MG/DL (ref 0.7–1.2)
CREAT UR-MCNC: 126 MG/DL (ref 39–259)
CREAT UR-MCNC: 129.9 MG/DL (ref 39–259)
EPI CELLS #/AREA URNS HPF: ABNORMAL /HPF (ref 0–5)
FREE KAPPA/LAMBDA RATIO: 1.97 (ref 0.26–1.65)
GFR SERPL CREATININE-BSD FRML MDRD: 28 ML/MIN/1.73M2
GLUCOSE SERPL-MCNC: 144 MG/DL (ref 70–99)
GLUCOSE UR STRIP-MCNC: ABNORMAL MG/DL
HGB UR QL STRIP.AUTO: NEGATIVE
KAPPA LC FREE SER-MCNC: 52.9 MG/L (ref 3.7–19.4)
KETONES UR STRIP-MCNC: NEGATIVE MG/DL
LAMBDA LC FREE SERPL-MCNC: 26.8 MG/L (ref 5.7–26.3)
LEUKOCYTE ESTERASE UR QL STRIP: NEGATIVE
MAGNESIUM SERPL-MCNC: 1.9 MG/DL (ref 1.6–2.6)
MAGNESIUM SERPL-MCNC: 1.9 MG/DL (ref 1.6–2.6)
NITRITE UR QL STRIP: NEGATIVE
PH UR STRIP: 5.5 [PH] (ref 5–8)
POTASSIUM SERPL-SCNC: 4.1 MMOL/L (ref 3.7–5.3)
POTASSIUM SERPL-SCNC: 4.2 MMOL/L (ref 3.7–5.3)
PROT UR STRIP-MCNC: ABNORMAL MG/DL
RBC #/AREA URNS HPF: ABNORMAL /HPF (ref 0–2)
SODIUM SERPL-SCNC: 143 MMOL/L (ref 135–144)
SODIUM UR-SCNC: 22 MMOL/L
SP GR UR STRIP: 1.02 (ref 1–1.03)
TOTAL PROTEIN, URINE: 23 MG/DL
TOTAL PROTEIN, URINE: 24 MG/DL
TROPONIN I SERPL HS-MCNC: 52 NG/L (ref 0–22)
URINE TOTAL PROTEIN CREATININE RATIO: 0.19
UROBILINOGEN UR STRIP-ACNC: NORMAL EU/DL (ref 0–1)
WBC #/AREA URNS HPF: ABNORMAL /HPF (ref 0–5)

## 2023-11-25 PROCEDURE — 6370000000 HC RX 637 (ALT 250 FOR IP): Performed by: NURSE PRACTITIONER

## 2023-11-25 PROCEDURE — 97535 SELF CARE MNGMENT TRAINING: CPT

## 2023-11-25 PROCEDURE — 81001 URINALYSIS AUTO W/SCOPE: CPT

## 2023-11-25 PROCEDURE — 6370000000 HC RX 637 (ALT 250 FOR IP): Performed by: INTERNAL MEDICINE

## 2023-11-25 PROCEDURE — 84300 ASSAY OF URINE SODIUM: CPT

## 2023-11-25 PROCEDURE — 84156 ASSAY OF PROTEIN URINE: CPT

## 2023-11-25 PROCEDURE — 36415 COLL VENOUS BLD VENIPUNCTURE: CPT

## 2023-11-25 PROCEDURE — 80048 BASIC METABOLIC PNL TOTAL CA: CPT

## 2023-11-25 PROCEDURE — 83735 ASSAY OF MAGNESIUM: CPT

## 2023-11-25 PROCEDURE — 86335 IMMUNFIX E-PHORSIS/URINE/CSF: CPT

## 2023-11-25 PROCEDURE — 83520 IMMUNOASSAY QUANT NOS NONAB: CPT

## 2023-11-25 PROCEDURE — 82436 ASSAY OF URINE CHLORIDE: CPT

## 2023-11-25 PROCEDURE — 2700000000 HC OXYGEN THERAPY PER DAY

## 2023-11-25 PROCEDURE — 2060000000 HC ICU INTERMEDIATE R&B

## 2023-11-25 PROCEDURE — 84484 ASSAY OF TROPONIN QUANT: CPT

## 2023-11-25 PROCEDURE — 84166 PROTEIN E-PHORESIS/URINE/CSF: CPT

## 2023-11-25 PROCEDURE — 93005 ELECTROCARDIOGRAM TRACING: CPT | Performed by: NURSE PRACTITIONER

## 2023-11-25 PROCEDURE — 76775 US EXAM ABDO BACK WALL LIM: CPT

## 2023-11-25 PROCEDURE — 82570 ASSAY OF URINE CREATININE: CPT

## 2023-11-25 PROCEDURE — 97530 THERAPEUTIC ACTIVITIES: CPT

## 2023-11-25 PROCEDURE — 84132 ASSAY OF SERUM POTASSIUM: CPT

## 2023-11-25 PROCEDURE — 94761 N-INVAS EAR/PLS OXIMETRY MLT: CPT

## 2023-11-25 PROCEDURE — 2580000003 HC RX 258: Performed by: NURSE PRACTITIONER

## 2023-11-25 PROCEDURE — 99232 SBSQ HOSP IP/OBS MODERATE 35: CPT | Performed by: NURSE PRACTITIONER

## 2023-11-25 PROCEDURE — 97166 OT EVAL MOD COMPLEX 45 MIN: CPT

## 2023-11-25 RX ORDER — FUROSEMIDE 40 MG/1
40 TABLET ORAL EVERY OTHER DAY
Status: DISCONTINUED | OUTPATIENT
Start: 2023-11-25 | End: 2023-11-26

## 2023-11-25 RX ADMIN — OXYCODONE HYDROCHLORIDE AND ACETAMINOPHEN 500 MG: 500 TABLET ORAL at 20:22

## 2023-11-25 RX ADMIN — SODIUM CHLORIDE, PRESERVATIVE FREE 10 ML: 5 INJECTION INTRAVENOUS at 20:22

## 2023-11-25 RX ADMIN — Medication 1000 UNITS: at 08:51

## 2023-11-25 RX ADMIN — APIXABAN 2.5 MG: 2.5 TABLET, FILM COATED ORAL at 20:22

## 2023-11-25 RX ADMIN — AMLODIPINE BESYLATE 10 MG: 10 TABLET ORAL at 08:51

## 2023-11-25 RX ADMIN — TAMSULOSIN HYDROCHLORIDE 0.4 MG: 0.4 CAPSULE ORAL at 08:51

## 2023-11-25 RX ADMIN — OXYCODONE HYDROCHLORIDE AND ACETAMINOPHEN 500 MG: 500 TABLET ORAL at 08:50

## 2023-11-25 RX ADMIN — SERTRALINE 25 MG: 25 TABLET, FILM COATED ORAL at 20:22

## 2023-11-25 RX ADMIN — RIVASTIGMINE TARTRATE 6 MG: 1.5 CAPSULE ORAL at 20:23

## 2023-11-25 RX ADMIN — FUROSEMIDE 40 MG: 40 TABLET ORAL at 11:37

## 2023-11-25 RX ADMIN — RIVASTIGMINE TARTRATE 6 MG: 1.5 CAPSULE ORAL at 08:50

## 2023-11-25 RX ADMIN — SODIUM CHLORIDE, PRESERVATIVE FREE 10 ML: 5 INJECTION INTRAVENOUS at 08:55

## 2023-11-25 RX ADMIN — PANTOPRAZOLE SODIUM 40 MG: 40 TABLET, DELAYED RELEASE ORAL at 05:31

## 2023-11-25 RX ADMIN — APIXABAN 2.5 MG: 2.5 TABLET, FILM COATED ORAL at 08:51

## 2023-11-25 RX ADMIN — ALLOPURINOL 300 MG: 300 TABLET ORAL at 08:50

## 2023-11-25 RX ADMIN — ATORVASTATIN CALCIUM 40 MG: 40 TABLET, FILM COATED ORAL at 20:22

## 2023-11-25 RX ADMIN — FERROUS SULFATE TAB EC 325 MG (65 MG FE EQUIVALENT) 325 MG: 325 (65 FE) TABLET DELAYED RESPONSE at 11:38

## 2023-11-25 NOTE — PLAN OF CARE
Problem: Discharge Planning  Goal: Discharge to home or other facility with appropriate resources  Outcome: Progressing     Problem: Musculoskeletal - Adult  Goal: Return mobility to safest level of function  11/25/2023 1450 by Mendel Del Angel RN  Outcome: Progressing   Generalized weakness, up with x1 assist and use of walker, gait slow and mildly unsteady    Problem: Safety - Adult  Goal: Free from fall injury  11/25/2023 1450 by Mendel Del Angel RN  Outcome: Adequate for Discharge   Alert/oriented, high fall risk per falls risk assessment. Remains free from falls and injuries, call light at reach and utilized appropriately. Bed in lowest position with wheels locked and alarm armed. Personal items that are used frequently are within reach. Up to chair/bathroom with assist, no attempts to get up unassisted, falling star and hourly rounding implemented, will continue to monitor and educate as needed     Problem: Skin/Tissue Integrity  Goal: Absence of new skin breakdown  Description: 1. Monitor for areas of redness and/or skin breakdown  2. Assess vascular access sites hourly  3. Every 4-6 hours minimum:  Change oxygen saturation probe site  4. Every 4-6 hours:  If on nasal continuous positive airway pressure, respiratory therapy assess nares and determine need for appliance change or resting period.   Outcome: Adequate for Discharge

## 2023-11-26 VITALS
HEART RATE: 59 BPM | RESPIRATION RATE: 14 BRPM | BODY MASS INDEX: 29.22 KG/M2 | DIASTOLIC BLOOD PRESSURE: 60 MMHG | TEMPERATURE: 98.2 F | HEIGHT: 68 IN | OXYGEN SATURATION: 93 % | SYSTOLIC BLOOD PRESSURE: 106 MMHG | WEIGHT: 192.8 LBS

## 2023-11-26 LAB
ANION GAP SERPL CALCULATED.3IONS-SCNC: 12 MMOL/L (ref 9–17)
BNP SERPL-MCNC: 2666 PG/ML
BUN SERPL-MCNC: 53 MG/DL (ref 8–23)
BUN/CREAT SERPL: 25 (ref 9–20)
CALCIUM SERPL-MCNC: 9 MG/DL (ref 8.6–10.4)
CHLORIDE SERPL-SCNC: 108 MMOL/L (ref 98–107)
CO2 SERPL-SCNC: 23 MMOL/L (ref 20–31)
CREAT SERPL-MCNC: 2.1 MG/DL (ref 0.7–1.2)
GFR SERPL CREATININE-BSD FRML MDRD: 30 ML/MIN/1.73M2
GLUCOSE SERPL-MCNC: 145 MG/DL (ref 70–99)
MAGNESIUM SERPL-MCNC: 1.8 MG/DL (ref 1.6–2.6)
POTASSIUM SERPL-SCNC: 4 MMOL/L (ref 3.7–5.3)
SODIUM SERPL-SCNC: 143 MMOL/L (ref 135–144)

## 2023-11-26 PROCEDURE — 83735 ASSAY OF MAGNESIUM: CPT

## 2023-11-26 PROCEDURE — 83880 ASSAY OF NATRIURETIC PEPTIDE: CPT

## 2023-11-26 PROCEDURE — 2700000000 HC OXYGEN THERAPY PER DAY

## 2023-11-26 PROCEDURE — 99239 HOSP IP/OBS DSCHRG MGMT >30: CPT | Performed by: NURSE PRACTITIONER

## 2023-11-26 PROCEDURE — 36415 COLL VENOUS BLD VENIPUNCTURE: CPT

## 2023-11-26 PROCEDURE — 80048 BASIC METABOLIC PNL TOTAL CA: CPT

## 2023-11-26 PROCEDURE — 6370000000 HC RX 637 (ALT 250 FOR IP): Performed by: NURSE PRACTITIONER

## 2023-11-26 RX ORDER — FUROSEMIDE 20 MG/1
20 TABLET ORAL DAILY
Status: DISCONTINUED | OUTPATIENT
Start: 2023-11-27 | End: 2023-11-26 | Stop reason: HOSPADM

## 2023-11-26 RX ORDER — BUMETANIDE 2 MG/1
1 TABLET ORAL
Qty: 30 TABLET | Refills: 3 | Status: SHIPPED | OUTPATIENT
Start: 2023-11-27 | End: 2023-11-26 | Stop reason: SDUPTHER

## 2023-11-26 RX ORDER — BUMETANIDE 1 MG/1
1 TABLET ORAL
Qty: 30 TABLET | Refills: 3 | Status: SHIPPED | OUTPATIENT
Start: 2023-11-27

## 2023-11-26 RX ADMIN — TAMSULOSIN HYDROCHLORIDE 0.4 MG: 0.4 CAPSULE ORAL at 09:02

## 2023-11-26 RX ADMIN — Medication 1000 UNITS: at 09:02

## 2023-11-26 RX ADMIN — RIVASTIGMINE TARTRATE 6 MG: 1.5 CAPSULE ORAL at 09:02

## 2023-11-26 RX ADMIN — APIXABAN 2.5 MG: 2.5 TABLET, FILM COATED ORAL at 09:02

## 2023-11-26 RX ADMIN — ALLOPURINOL 300 MG: 300 TABLET ORAL at 09:02

## 2023-11-26 RX ADMIN — OXYCODONE HYDROCHLORIDE AND ACETAMINOPHEN 500 MG: 500 TABLET ORAL at 09:02

## 2023-11-26 RX ADMIN — AMLODIPINE BESYLATE 10 MG: 10 TABLET ORAL at 09:02

## 2023-11-26 RX ADMIN — PANTOPRAZOLE SODIUM 40 MG: 40 TABLET, DELAYED RELEASE ORAL at 05:26

## 2023-11-26 NOTE — DISCHARGE INSTRUCTIONS
Please take the Bumex 1 mg on Monday Wednesdays and Fridays. -This is the replacement for Lasix. It is a little bit easier on your kidneys. This should help with the fluid overload until your cardiologist determines the most appropriate treatment. Please limit your oral fluid to 2000 mL/day. Please limit your salt intake to less than 3 g/day. It is easiest to do this by eliminating salty snacks and processed foods  You will need to have your blood drawn in approximately 3 days. The kidney specialist will follow these results. His contact information is in your discharge instructions. Please see your regular cardiologist next week as recommended. In the meantime you should not take your valsartan. This can be hard on your kidneys and your blood pressure was normal without it. You will need to start the Eliquis to prevent you from developing blood clots         Fluid Restriction: Care Instructions  Overview     A buildup of fluid in the body can cause low sodium levels in the blood. It may also cause symptoms such as swelling and pain. Your doctor may suggest that you limit liquids, including foods that contain a lot of liquid. Limiting liquids is called fluid restriction. Limiting the amount of fluids you take in can help balance sodium levels in your body. Your doctor will tell you how much fluid you can have in a day. Follow-up care is a key part of your treatment and safety. Be sure to make and go to all appointments, and call your doctor if you are having problems. It's also a good idea to know your test results and keep a list of the medicines you take. How can you care for yourself at home? Find a way of tracking the fluids you take in that works for you. Here are two methods you can try:  Write down how much you drink throughout the day. Keep a container filled with the amount of liquid allowed for the day.  As you drink liquids during the day, such as a 6-ounce cup of coffee, pour that same

## 2023-11-26 NOTE — DISCHARGE SUMMARY
[facility]  Office: [phone]  Jil Martínez DO, Abe Stafford DO, Lianna Monae DO, Kasandra Bragg MD, Stacey Louise MD, Danielle Pedro MD, Niya Claros MD,  Peggy Zaldivar MD, Maria Elena Chong MD, Jennifer Wolf MD,  Nba Jimenez MD, Lady Mcclain MD, Mireya Haynes DO, Lay Alvarez MD,  August Nuñez DO, Saskia Fountain MD, Anny Vallejo MD, Nicolás Granger MD, Joao Mccollum MD,  Sruthi Mathews MD, Rene Hines MD, Raza Montgomery MD, Tj Novak MD, Rafaela Thakkar MD, Og Houston DO, Susan Dean DO, Carline Webb MD,  Travis Perry MD, Dwight Marcial, CNP,  Yesica Russell, CNP, Ayala Maravilla, CNP,  Oleksandr Swenson, [facility] [place], Meet Contreras, CNP, Zayda Fay, CNP, Tommy Cedillo, CNP, Brittani Rossi, CNP, Johnson Monk, CNP, Elliot Pearl, [facility], Francisco Murillo, CNP, Nina White, CNS, Sally Lopez, CNP, Bhupinder Dalal, [facility]    Discharge Summary     Patient ID: Allen Rodríguez  :  1935   MRN: [id number]     ACCOUNT:  [de-identified]   Patient's PCP: Yovany Upton MD  Admit Date: 2023   Discharge Date: 2023     Length of Stay: 5  Code Status:  Full Code  Admitting Physician: Joao Mccollum MD  Discharge Physician: JOSE Delgado NP     Active Discharge Diagnoses:     Hospital Problem Lists:  Principal Problem:    Acute on chronic heart failure, unspecified heart failure type [facility])  Active Problems:    Alzheimer's disease, unspecified ([address])    Atrial fibrillation ([address])    Cardiomyopathy, unspecified ([address])    Diabetes mellitus ([address])    Dyslipidemia    Essential hypertension    Hypertensive heart and kidney disease    Parkinson's disease    Stage 3 chronic kidney disease ([address])  Resolved Problems:    * No resolved hospital problems.  *      Admission Condition:  fair     Discharged Condition: fair    Hospital Stay:     Hospital Course:

## 2023-11-26 NOTE — DISCHARGE INSTR - COC
Continuity of Care Form    Patient Name: Dee Patton   :  1935  MRN:  [id number]    Admit date:  2023  Discharge date:      Code Status Order: Full Code   Advance Directives:     Admitting Physician:  Beulah Gallardo MD  PCP: Lilibeth Ledesma MD    Discharging Nurse:  [facility] Unit/Room#: 6713/7054-07  Discharging Unit Phone Number: [phone]    Emergency Contact:   Extended Emergency Contact Information  Primary Emergency Contact: Troysangeetha Bowers [facility] [address] [address] Phone: [phone]  Relation: Unknown  Secondary Emergency Contact: Apple Section  Home Phone: [phone]  Relation: Child  Preferred language: English   needed?  No    Past Surgical History:  Past Surgical History:   Procedure Laterality Date    APPENDECTOMY      BLADDER SURGERY      SPHINTER CUFF INSERT    CARDIOVERSION N/A 2019    Dr. Selene Macias  cardioverted a fib to RSR    CHOLECYSTECTOMY      COLONOSCOPY      ESOPHAGOGASTRODUODENOSCOPY  2023    LAPAROSCOPIC LEFT COLON RESECTION      LITHOTRIPSY      PROCTOSIGMOIDOSCOPY N/A 2023    FLEXIBLE SIGMOIDOSCOPY performed by Honor Buerger, MD at [address] Left     TOTAL KNEE ARTHROPLASTY      RIGHT AND LEFT knees    TRANSESOPHAGEAL ECHOCARDIOGRAM N/A 2019    Dr. Kary Foreman N/A 2023    ENDOSCOPIC ULTRASOUND WITH PATHOLOGY performed by Honor Buerger, MD at [facility]  2023    EGD BIOPSY performed by Honor Buerger, MD at [facility] History:   Immunization History   Administered Date(s) Administered    COVID-19, PFIZER PURPLE top, DILUTE for use, (age 15 y+), 30mcg/0.3mL 2021, 2021, 2021       Active Problems:  Patient Active Problem List   Diagnosis Code    Alzheimer's disease, unspecified ([address]) G30.9, F02.80    Atrial fibrillation ([address]) I48.91    Cardiomyopathy, unspecified

## 2023-11-27 LAB
ANA SER QL IA: NEGATIVE
COLLECT DURATION TIME SPEC: ABNORMAL H
DSDNA IGG SER QL IA: 2.1 IU/ML
FREE KAPPA LT CHAINS,UR: 194.73 MG/L (ref 0–32.9)
FREE LAMBDA LT CHAINS,UR: 17.91 MG/L (ref 0–3.79)
FREE UR LAMBDA EXCRETION/DAY: ABNORMAL MG/D
NUCLEAR IGG SER IA-RTO: 0.2 U/ML
PROTEIN, TOTAL URINE: ABNORMAL MG/D
SPECIMEN VOL ?TM UR: ABNORMAL ML
URINE FREE KAPPA EXCRETION/DAY: ABNORMAL MG/D

## 2023-11-28 LAB
ALBUMIN PERCENT: 69 % (ref 45–65)
ALBUMIN PERCENT: 69 % (ref 45–65)
ALBUMIN SERPL-MCNC: 4.1 G/DL (ref 3.2–5.2)
ALBUMIN SERPL-MCNC: 4.1 G/DL (ref 3.2–5.2)
ALPHA 2 PERCENT: 10 % (ref 6–13)
ALPHA 2 PERCENT: 10 % (ref 6–13)
ALPHA1 GLOB SERPL ELPH-MCNC: 0.2 G/DL (ref 0.1–0.4)
ALPHA1 GLOB SERPL ELPH-MCNC: 0.2 G/DL (ref 0.1–0.4)
ALPHA1 GLOB SERPL ELPH-MCNC: 3 % (ref 3–6)
ALPHA1 GLOB SERPL ELPH-MCNC: 3 % (ref 3–6)
ALPHA2 GLOB SERPL ELPH-MCNC: 0.6 G/DL (ref 0.5–0.9)
ALPHA2 GLOB SERPL ELPH-MCNC: 0.6 G/DL (ref 0.5–0.9)
B-GLOBULIN SERPL ELPH-MCNC: 0.5 G/DL (ref 0.5–1.1)
B-GLOBULIN SERPL ELPH-MCNC: 0.5 G/DL (ref 0.5–1.1)
B-GLOBULIN SERPL ELPH-MCNC: 9 % (ref 11–19)
B-GLOBULIN SERPL ELPH-MCNC: 9 % (ref 11–19)
EKG Q-T INTERVAL: 502 MS
EKG QRS DURATION: 132 MS
EKG QTC CALCULATION (BAZETT): 476 MS
EKG R AXIS: -42 DEGREES
EKG T AXIS: -8 DEGREES
EKG VENTRICULAR RATE: 54 BPM
GAMMA GLOB SERPL ELPH-MCNC: 0.6 G/DL (ref 0.5–1.5)
GAMMA GLOB SERPL ELPH-MCNC: 0.6 G/DL (ref 0.5–1.5)
GAMMA GLOBULIN %: 10 % (ref 9–20)
GAMMA GLOBULIN %: 10 % (ref 9–20)
INTERPRETATION SERPL IFE-IMP: NORMAL
P E INTERPRETATION, U: NORMAL
PATH REV: NORMAL
PATHOLOGIST: ABNORMAL
PATHOLOGIST: ABNORMAL
PATHOLOGIST: NORMAL
PROT PATTERN SERPL ELPH-IMP: ABNORMAL
PROT PATTERN SERPL ELPH-IMP: ABNORMAL
PROT SERPL-MCNC: 5.9 G/DL (ref 6.4–8.3)
PROT SERPL-MCNC: 5.9 G/DL (ref 6.4–8.3)
SPECIMEN TYPE: NORMAL
SPECIMEN TYPE: NORMAL
TOTAL PROT. SUM,%: 101 % (ref 98–102)
TOTAL PROT. SUM,%: 101 % (ref 98–102)
TOTAL PROT. SUM: 6 G/DL (ref 6.3–8.2)
TOTAL PROT. SUM: 6 G/DL (ref 6.3–8.2)
URINE IFX INTERP: NORMAL
URINE TOTAL PROTEIN: 24 MG/DL
URINE TOTAL PROTEIN: 24 MG/DL

## 2023-11-29 ENCOUNTER — HOSPITAL ENCOUNTER (OUTPATIENT)
Age: 88
Setting detail: SPECIMEN
Discharge: HOME | End: 2023-11-29

## 2023-11-29 DIAGNOSIS — N18.32 STAGE 3B CHRONIC KIDNEY DISEASE (HCC): ICD-10-CM

## 2023-11-29 LAB
ANION GAP SERPL CALCULATED.3IONS-SCNC: 13 MMOL/L (ref 9–16)
BUN SERPL-MCNC: 47 MG/DL (ref 8–23)
CALCIUM SERPL-MCNC: 9.4 MG/DL (ref 8.6–10.4)
CHLORIDE SERPL-SCNC: 107 MMOL/L (ref 98–107)
CO2 SERPL-SCNC: 23 MMOL/L (ref 20–31)
CREAT SERPL-MCNC: 1.8 MG/DL (ref 0.7–1.2)
GFR SERPL CREATININE-BSD FRML MDRD: 37 ML/MIN/1.73M2
GLUCOSE SERPL-MCNC: 114 MG/DL (ref 74–99)
POTASSIUM SERPL-SCNC: 3.9 MMOL/L (ref 3.7–5.3)
SODIUM SERPL-SCNC: 143 MMOL/L (ref 136–145)

## 2023-12-01 ENCOUNTER — HOSPITAL ENCOUNTER (OUTPATIENT)
Age: 88
Setting detail: SPECIMEN
Discharge: HOME | End: 2023-12-01

## 2023-12-01 ENCOUNTER — OFFICE VISIT (OUTPATIENT)
Dept: SURGERY | Age: 88
End: 2023-12-01
Payer: MEDICARE

## 2023-12-01 VITALS
SYSTOLIC BLOOD PRESSURE: 148 MMHG | HEIGHT: 68 IN | OXYGEN SATURATION: 100 % | RESPIRATION RATE: 16 BRPM | BODY MASS INDEX: 29.1 KG/M2 | HEART RATE: 65 BPM | DIASTOLIC BLOOD PRESSURE: 61 MMHG | WEIGHT: 192 LBS

## 2023-12-01 DIAGNOSIS — I50.9 ACUTE ON CHRONIC HEART FAILURE, UNSPECIFIED HEART FAILURE TYPE (HCC): Primary | ICD-10-CM

## 2023-12-01 DIAGNOSIS — Z85.038 HISTORY OF COLON CANCER IN ADULTHOOD: ICD-10-CM

## 2023-12-01 LAB — CEA SERPL-MCNC: 2.5 NG/ML

## 2023-12-01 PROCEDURE — 1123F ACP DISCUSS/DSCN MKR DOCD: CPT | Performed by: COLON & RECTAL SURGERY

## 2023-12-01 PROCEDURE — G8419 CALC BMI OUT NRM PARAM NOF/U: HCPCS | Performed by: COLON & RECTAL SURGERY

## 2023-12-01 PROCEDURE — 1036F TOBACCO NON-USER: CPT | Performed by: COLON & RECTAL SURGERY

## 2023-12-01 PROCEDURE — 99204 OFFICE O/P NEW MOD 45 MIN: CPT | Performed by: COLON & RECTAL SURGERY

## 2023-12-01 PROCEDURE — 1111F DSCHRG MED/CURRENT MED MERGE: CPT | Performed by: COLON & RECTAL SURGERY

## 2023-12-01 PROCEDURE — G8484 FLU IMMUNIZE NO ADMIN: HCPCS | Performed by: COLON & RECTAL SURGERY

## 2023-12-01 PROCEDURE — G8428 CUR MEDS NOT DOCUMENT: HCPCS | Performed by: COLON & RECTAL SURGERY

## 2023-12-01 ASSESSMENT — ENCOUNTER SYMPTOMS
NAUSEA: 0
BLOOD IN STOOL: 0
ABDOMINAL PAIN: 0
CONSTIPATION: 0
RECTAL PAIN: 0
DIARRHEA: 0
WHEEZING: 1
STRIDOR: 0
SHORTNESS OF BREATH: 1
COUGH: 0
BACK PAIN: 0
VOMITING: 0
APNEA: 0
ABDOMINAL DISTENTION: 0
ANAL BLEEDING: 0
CHEST TIGHTNESS: 0
COLOR CHANGE: 0

## 2023-12-01 NOTE — PROGRESS NOTES
Call for results of 2022 upper and colonoscopy.
a
Sig Dispense Refill    apixaban (ELIQUIS) 2.5 MG TABS tablet Take 1 tablet by mouth 2 times daily 60 tablet 3    bumetanide (BUMEX) 1 MG tablet Take 1 tablet by mouth Every Monday, Wednesday, and Friday 30 tablet 3    pantoprazole (PROTONIX) 40 MG tablet Take 1 tablet by mouth every morning (before breakfast) 30 tablet 0    Iron, Ferrous Sulfate, 325 (65 Fe) MG TABS Take 1 tablet by mouth every other day      Ascorbic Acid 500 MG CHEW Take 500 mg by mouth 2 times daily      dapagliflozin (FARXIGA) 5 MG tablet Take 1 tablet by mouth every morning      rivastigmine (EXELON) 6 MG capsule Take 1 capsule by mouth 2 times daily      sertraline (ZOLOFT) 25 MG tablet Take 1 tablet by mouth daily      allopurinol (ZYLOPRIM) 300 MG tablet Take 1 tablet by mouth daily      amLODIPine (NORVASC) 10 MG tablet Take 1 tablet by mouth daily      aspirin 81 MG chewable tablet Take 1 tablet by mouth nightly      simvastatin (ZOCOR) 20 MG tablet Take 1 tablet by mouth daily      vitamin D (CHOLECALCIFEROL) 1000 UNIT TABS tablet Take 3 tablets by mouth daily       No current facility-administered medications for this visit. No Known Allergies    Review of Systems   Constitutional:  Negative for activity change, appetite change, chills, diaphoresis, fatigue, fever and unexpected weight change. Respiratory:  Positive for shortness of breath and wheezing. Negative for apnea, cough, chest tightness and stridor. Cardiovascular:  Positive for leg swelling. Negative for chest pain. Gastrointestinal:  Negative for abdominal distention, abdominal pain, anal bleeding, blood in stool, constipation, diarrhea, nausea, rectal pain and vomiting. Genitourinary:  Negative for difficulty urinating, dysuria, enuresis, flank pain, frequency, hematuria and urgency. Musculoskeletal:  Negative for back pain. Skin:  Negative for color change and pallor. Allergic/Immunologic: Negative for food allergies and immunocompromised state.

## 2023-12-06 ENCOUNTER — TELEPHONE (OUTPATIENT)
Dept: SURGERY | Age: 88
End: 2023-12-06

## 2023-12-06 NOTE — TELEPHONE ENCOUNTER
Received call from Crystal Caba @ Dr. Teodoro Moreno office. Stated they have received several calls from the pts daughter wanting to schedule him with Dr. Teodoro Moreno but they have not received the referral yet so they are unable to schedule him. Would like to know if referral can be faxed to them ASAP so they can get the pt scheduled. Pt was referred by Dr. Kanwal Jimenez.     Fax #:[phone] --- call back#: [phone]

## 2024-01-11 ENCOUNTER — HOSPITAL ENCOUNTER (OUTPATIENT)
Age: 89
Setting detail: SPECIMEN
Discharge: HOME | End: 2024-01-11

## 2024-01-11 LAB
ANION GAP SERPL CALCULATED.3IONS-SCNC: 14 MMOL/L (ref 9–17)
BUN SERPL-MCNC: 34 MG/DL (ref 8–23)
CALCIUM SERPL-MCNC: 9.3 MG/DL (ref 8.6–10.4)
CHLORIDE SERPL-SCNC: 107 MMOL/L (ref 98–107)
CO2 SERPL-SCNC: 21 MMOL/L (ref 20–31)
CREAT SERPL-MCNC: 1.3 MG/DL (ref 0.7–1.2)
GFR SERPL CREATININE-BSD FRML MDRD: 54 ML/MIN/1.73M2
GLUCOSE SERPL-MCNC: 129 MG/DL (ref 70–99)
POTASSIUM SERPL-SCNC: 3.8 MMOL/L (ref 3.7–5.3)
PSA SERPL-MCNC: <0.02 NG/ML
SODIUM SERPL-SCNC: 142 MMOL/L (ref 135–144)

## 2024-01-12 ENCOUNTER — TELEPHONE (OUTPATIENT)
Dept: ONCOLOGY | Age: 89
End: 2024-01-12

## 2024-01-12 NOTE — TELEPHONE ENCOUNTER
VM for daughter, Preeti, to call and schedule a consult appt for pt with Dr Thompson    Electronically signed by Ashley Paz on 1/12/2024 at 10:50 AM

## 2024-01-29 ENCOUNTER — TRANSCRIBE ORDERS (OUTPATIENT)
Age: 89
End: 2024-01-29

## 2024-01-29 DIAGNOSIS — R94.31 ABNORMAL ELECTROCARDIOGRAM: Primary | ICD-10-CM

## 2024-02-06 ENCOUNTER — TELEPHONE (OUTPATIENT)
Dept: ONCOLOGY | Age: 89
End: 2024-02-06

## 2024-02-06 ENCOUNTER — INITIAL CONSULT (OUTPATIENT)
Dept: ONCOLOGY | Age: 89
End: 2024-02-06
Payer: MEDICARE

## 2024-02-06 VITALS
TEMPERATURE: 97.7 F | HEIGHT: 68 IN | DIASTOLIC BLOOD PRESSURE: 71 MMHG | HEART RATE: 73 BPM | SYSTOLIC BLOOD PRESSURE: 135 MMHG | WEIGHT: 182.9 LBS | BODY MASS INDEX: 27.72 KG/M2

## 2024-02-06 DIAGNOSIS — D64.9 ANEMIA, UNSPECIFIED TYPE: ICD-10-CM

## 2024-02-06 DIAGNOSIS — Z85.038 HISTORY OF COLON CANCER: ICD-10-CM

## 2024-02-06 DIAGNOSIS — D47.2 MGUS (MONOCLONAL GAMMOPATHY OF UNKNOWN SIGNIFICANCE): Primary | ICD-10-CM

## 2024-02-06 PROCEDURE — G8484 FLU IMMUNIZE NO ADMIN: HCPCS | Performed by: INTERNAL MEDICINE

## 2024-02-06 PROCEDURE — 99204 OFFICE O/P NEW MOD 45 MIN: CPT | Performed by: INTERNAL MEDICINE

## 2024-02-06 PROCEDURE — G8419 CALC BMI OUT NRM PARAM NOF/U: HCPCS | Performed by: INTERNAL MEDICINE

## 2024-02-06 PROCEDURE — G8427 DOCREV CUR MEDS BY ELIG CLIN: HCPCS | Performed by: INTERNAL MEDICINE

## 2024-02-06 NOTE — PROGRESS NOTES
Patient ID: Duane Adamczak, 1935, [id number], 88 y.o.  Referred by :Opal Camejo, JOSE - CNP  [address] [place] [address]   Reason for consultation:   MGUS  HISTORY OF PRESENT ILLNESS:    Oncologic History:  Duane Adamczak is a 88 y.o. male with a past medical history of atrial fibrillation, colon cancer, CHF, CKD, anemia was seen during initial consultation visit for MGUS.    Patient has a history of colon cancer diagnosed in May 2020 status post resection at [facility] under care of Dr. Ash.  Based on family, he did not need chemotherapy and currently in remission for that and following with his colorectal surgeon.  He did not see medical oncology at that time.    Patient recently was admitted to hospital in November with atrial fibrillation and acute kidney injury.  As per family patient has history of atrial fibrillation in the past and was following with cardiology but his Eliquis was taken off after his colon surgery.  When he was in the hospital in November his Eliquis was resumed as he was in atrial fibrillation.  During the hospitalization he had a CT of the chest with contrast.  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    Patient is accompanied by his daughter during today's office visit.  He lives with his wife in the independent living facility.  He denies any new bone pain back pain.  He does have mild anemia.  His recent lab work with his PCP showed normal iron studies.  He has a mildly low B12 and he is planning to start over-the-counter B12 supplements 1.    Past Medical History:   Diagnosis Date    Abnormal EKG 12/30/2022    at [facility]    Anesthesia complication     per daughter, often has \"delerium\" post anesthesia for weeks    Atrial

## 2024-02-06 NOTE — TELEPHONE ENCOUNTER
Labs in 3 weeks   RTc  4 weeks        Pt will have labs drawn one week prior to RV    Pt would like to be seen at [facility]. Writer called [facility] and got the pt put on to see the provider on 3/6/24 @ 11:15am    PT was given AVS and appt schedule    Electronically signed by Kristina Chanel on 2/6/2024 at 10:01 AM

## 2024-02-07 ENCOUNTER — ANESTHESIA EVENT (OUTPATIENT)
Age: 89
End: 2024-02-07
Payer: MEDICARE

## 2024-02-07 ENCOUNTER — HOSPITAL ENCOUNTER (OUTPATIENT)
Age: 89
Setting detail: OUTPATIENT SURGERY
Discharge: HOME | End: 2024-02-07
Attending: INTERNAL MEDICINE | Admitting: INTERNAL MEDICINE
Payer: MEDICARE

## 2024-02-07 ENCOUNTER — ANESTHESIA (OUTPATIENT)
Age: 89
End: 2024-02-07
Payer: MEDICARE

## 2024-02-07 VITALS
RESPIRATION RATE: 25 BRPM | SYSTOLIC BLOOD PRESSURE: 116 MMHG | DIASTOLIC BLOOD PRESSURE: 77 MMHG | BODY MASS INDEX: 30.66 KG/M2 | TEMPERATURE: 97.5 F | HEIGHT: 65 IN | WEIGHT: 184 LBS | OXYGEN SATURATION: 93 % | HEART RATE: 77 BPM

## 2024-02-07 DIAGNOSIS — R94.31 ABNORMAL ELECTROCARDIOGRAM: ICD-10-CM

## 2024-02-07 LAB
ECHO BSA: 1.96 M2
ECHO BSA: 1.96 M2
GLUCOSE BLD-MCNC: 112 MG/DL (ref 75–110)
GLUCOSE BLD-MCNC: 137 MG/DL (ref 75–110)

## 2024-02-07 PROCEDURE — 92960 CARDIOVERSION ELECTRIC EXT: CPT

## 2024-02-07 PROCEDURE — 2580000003 HC RX 258: Performed by: NURSE ANESTHETIST, CERTIFIED REGISTERED

## 2024-02-07 PROCEDURE — 82947 ASSAY GLUCOSE BLOOD QUANT: CPT

## 2024-02-07 PROCEDURE — 6360000002 HC RX W HCPCS: Performed by: NURSE ANESTHETIST, CERTIFIED REGISTERED

## 2024-02-07 PROCEDURE — 3700000000 HC ANESTHESIA ATTENDED CARE: Performed by: INTERNAL MEDICINE

## 2024-02-07 PROCEDURE — 7100000011 HC PHASE II RECOVERY - ADDTL 15 MIN: Performed by: INTERNAL MEDICINE

## 2024-02-07 PROCEDURE — 7100000010 HC PHASE II RECOVERY - FIRST 15 MIN: Performed by: INTERNAL MEDICINE

## 2024-02-07 RX ORDER — PROPOFOL 10 MG/ML
INJECTION, EMULSION INTRAVENOUS PRN
Status: DISCONTINUED | OUTPATIENT
Start: 2024-02-07 | End: 2024-02-07 | Stop reason: SDUPTHER

## 2024-02-07 RX ORDER — SODIUM CHLORIDE 9 MG/ML
INJECTION, SOLUTION INTRAVENOUS CONTINUOUS
Status: DISCONTINUED | OUTPATIENT
Start: 2024-02-07 | End: 2024-02-07 | Stop reason: HOSPADM

## 2024-02-07 RX ORDER — SODIUM CHLORIDE 9 MG/ML
INJECTION, SOLUTION INTRAVENOUS CONTINUOUS PRN
Status: DISCONTINUED | OUTPATIENT
Start: 2024-02-07 | End: 2024-02-07 | Stop reason: SDUPTHER

## 2024-02-07 RX ADMIN — SODIUM CHLORIDE: 9 INJECTION, SOLUTION INTRAVENOUS at 11:25

## 2024-02-07 RX ADMIN — PROPOFOL 80 MG: 10 INJECTION, EMULSION INTRAVENOUS at 11:25

## 2024-02-07 ASSESSMENT — PAIN - FUNCTIONAL ASSESSMENT
PAIN_FUNCTIONAL_ASSESSMENT: FACE, LEGS, ACTIVITY, CRY, AND CONSOLABILITY (FLACC)
PAIN_FUNCTIONAL_ASSESSMENT: 0-10

## 2024-02-07 NOTE — INTERVAL H&P NOTE
Update History & Physical    The patient's History and Physical of January 18, 2024 was reviewed with the patient and I examined the patient. There was no change. The surgical site was confirmed by the patient and me.     Plan: The risks, benefits, expected outcome, and alternative to the recommended procedure have been discussed with the patient. Patient understands and wants to proceed with the procedure.     Electronically signed by Matilde Logan MD on 2/7/2024 at 11:36 AM

## 2024-02-07 NOTE — ANESTHESIA POSTPROCEDURE EVALUATION
Department of Anesthesiology  Postprocedure Note    Patient: Duane Adamczak  MRN: [id number]  YOB: 1935  Date of evaluation: 2/7/2024    Procedure Summary       Date: 02/07/24 Room / Location: [facility] Cardiac Cath/EP Lab    Anesthesia Start: 1122 Anesthesia Stop: 1133    Procedure: CARDIOVERSION EXTERNAL Diagnosis:       Abnormal electrocardiogram      Abnormal electrocardiogram      (R94.31)    Scheduled Providers: Matilde Logan MD Responsible Provider: Rocky Rawls DO    Anesthesia Type: MAC ASA Status: 3            Anesthesia Type: No value filed.    Kamille Phase I: Kamille Score: 10    Kamille Phase II: Kamlile Score: 10    Anesthesia Post Evaluation    Patient location during evaluation: PACU  Patient participation: complete - patient participated  Level of consciousness: awake and alert  Airway patency: patent  Nausea & Vomiting: no nausea and no vomiting  Cardiovascular status: hemodynamically stable  Respiratory status: acceptable  Hydration status: stable  Pain management: adequate    There were no known notable events for this encounter.

## 2024-02-07 NOTE — PROCEDURES
Cardioversion Procedure Note    Indication: Persistent atrial fibrillation, dyspnea on exertion, cardiomyopathy    Procedure: synchronized electrical cardioversion.    Consent: The patient was counseled regarding the procedure, its indications, risks, potential complications and alternatives, and any questions were answered. Consent was obtained to proceed.    Pre-Medication: 80 mg of propofol were given intravenously by anesthesia that provided adequate anesthesia for the cardioversion.  Procedure: The patient was placed in the supine position and the chest area was exposed. The cardioversion pads were applied in the standard manner and configuration, the defibrillator was set on the synchronous mode and charged to 200 joules,  and synchronized shock was delivered with successful termination of atrial fibrillation and sinus rhythm was restored.    The patient tolerated the procedure well and is currently recovering from anesthesia.    Complications: None    Conclusion: Successful synchronized electrial cardioversion wiith restoration of sinus rhythm     Plan: Continue Eliquis and other cardiac medications  Discharge home when he is back to his baseline and if he remains stable and follow up with my nurse practitioner in the office next week.  .  MD Toña Buitrago MD

## 2024-02-07 NOTE — ANESTHESIA PRE PROCEDURE
\"LABABO\", \"LABRH\"    Drug/Infectious Status (If Applicable):  Lab Results   Component Value Date/Time    HEPCAB NONREACTIVE 11/24/2023 12:27 PM       COVID-19 Screening (If Applicable):   Lab Results   Component Value Date/Time    COVID19 Not Detected 11/21/2023 11:05 AM           Anesthesia Evaluation  Patient summary reviewed and Nursing notes reviewed   no history of anesthetic complications:   Airway: Mallampati: II  TM distance: >3 FB   Neck ROM: full  Mouth opening: > = 3 FB   Dental:          Pulmonary:normal exam    (+)     sleep apnea:                                  Cardiovascular:  Exercise tolerance: no interval change  (+) hypertension:, CAD:, dysrhythmias: atrial fibrillation, CHF:      ECG reviewed    Rate: normal      Cleared by cardiology              Neuro/Psych:   (+) psychiatric history:            GI/Hepatic/Renal:             Endo/Other:    (+) Diabetes.                 Abdominal:             Vascular:          Other Findings:             Anesthesia Plan      MAC     ASA 3       Induction: intravenous.    MIPS: prophylactic pharmacologic antiemetic agents not administered perioperatively for documented reasons.  Anesthetic plan and risks discussed with patient.      Plan discussed with CRNA.                    Rocky Rawls DO   2/7/2024

## 2024-02-08 LAB
EKG ATRIAL RATE: 40 BPM
EKG Q-T INTERVAL: 498 MS
EKG QRS DURATION: 122 MS
EKG QTC CALCULATION (BAZETT): 501 MS
EKG R AXIS: -136 DEGREES
EKG T AXIS: 173 DEGREES
EKG VENTRICULAR RATE: 61 BPM

## 2024-02-26 ENCOUNTER — HOSPITAL ENCOUNTER (OUTPATIENT)
Age: 89
Setting detail: SPECIMEN
Discharge: HOME | End: 2024-02-26

## 2024-02-26 DIAGNOSIS — D47.2 MGUS (MONOCLONAL GAMMOPATHY OF UNKNOWN SIGNIFICANCE): ICD-10-CM

## 2024-02-26 LAB
ALBUMIN SERPL-MCNC: 4.6 G/DL (ref 3.5–5.2)
ALBUMIN/GLOB SERPL: 1.8 {RATIO} (ref 1–2.5)
ALP SERPL-CCNC: 107 U/L (ref 40–129)
ALT SERPL-CCNC: 17 U/L (ref 5–41)
ANION GAP SERPL CALCULATED.3IONS-SCNC: 10 MMOL/L (ref 9–17)
AST SERPL-CCNC: 27 U/L
BASOPHILS # BLD: 0 K/UL (ref 0–0.2)
BASOPHILS NFR BLD: 0 % (ref 0–2)
BILIRUB SERPL-MCNC: 1.5 MG/DL (ref 0.3–1.2)
BUN SERPL-MCNC: 30 MG/DL (ref 8–23)
CALCIUM SERPL-MCNC: 9.7 MG/DL (ref 8.6–10.4)
CHLORIDE SERPL-SCNC: 100 MMOL/L (ref 98–107)
CO2 SERPL-SCNC: 28 MMOL/L (ref 20–31)
CREAT SERPL-MCNC: 1.2 MG/DL (ref 0.7–1.2)
EOSINOPHIL # BLD: 0 K/UL (ref 0–0.4)
EOSINOPHILS RELATIVE PERCENT: 0 % (ref 1–4)
ERYTHROCYTE [DISTWIDTH] IN BLOOD BY AUTOMATED COUNT: 14.8 % (ref 11.8–14.4)
FREE KAPPA/LAMBDA RATIO: 1.81 (ref 0.26–1.65)
GFR SERPL CREATININE-BSD FRML MDRD: 59 ML/MIN/1.73M2
GLUCOSE SERPL-MCNC: 134 MG/DL (ref 70–99)
HCT VFR BLD AUTO: 34.5 % (ref 40.7–50.3)
HGB BLD-MCNC: 11 G/DL (ref 13–17)
IGA SERPL-MCNC: 125 MG/DL (ref 70–400)
IGG SERPL-MCNC: 935 MG/DL (ref 700–1600)
IGM SERPL-MCNC: 60 MG/DL (ref 40–230)
IMM GRANULOCYTES # BLD AUTO: 0.09 K/UL (ref 0–0.3)
IMM GRANULOCYTES NFR BLD: 1 %
KAPPA LC FREE SER-MCNC: 44.9 MG/L (ref 3.7–19.4)
LAMBDA LC FREE SERPL-MCNC: 24.8 MG/L (ref 5.7–26.3)
LYMPHOCYTES NFR BLD: 6.85 K/UL (ref 1–4.8)
LYMPHOCYTES RELATIVE PERCENT: 77 % (ref 24–44)
MCH RBC QN AUTO: 33 PG (ref 25.2–33.5)
MCHC RBC AUTO-ENTMCNC: 31.9 G/DL (ref 28.4–34.8)
MCV RBC AUTO: 103.6 FL (ref 82.6–102.9)
MONOCYTES NFR BLD: 0.45 K/UL (ref 0.1–0.8)
MONOCYTES NFR BLD: 5 % (ref 1–7)
MORPHOLOGY: ABNORMAL
NEUTROPHILS NFR BLD: 17 % (ref 36–66)
NEUTS SEG NFR BLD: 1.51 K/UL (ref 1.8–7.7)
NRBC BLD-RTO: 0 PER 100 WBC
PLATELET # BLD AUTO: 281 K/UL (ref 138–453)
PMV BLD AUTO: 10.3 FL (ref 8.1–13.5)
POTASSIUM SERPL-SCNC: 4.2 MMOL/L (ref 3.7–5.3)
PROT SERPL-MCNC: 7.1 G/DL (ref 6.4–8.3)
RBC # BLD AUTO: 3.33 M/UL (ref 4.21–5.77)
SODIUM SERPL-SCNC: 138 MMOL/L (ref 135–144)
WBC OTHER # BLD: 8.9 K/UL (ref 3.5–11.3)

## 2024-02-27 LAB
ALBUMIN PERCENT: 69 % (ref 45–65)
ALBUMIN SERPL-MCNC: 4.7 G/DL (ref 3.2–5.2)
ALPHA 2 PERCENT: 10 % (ref 6–13)
ALPHA1 GLOB SERPL ELPH-MCNC: 0.2 G/DL (ref 0.1–0.4)
ALPHA1 GLOB SERPL ELPH-MCNC: 3 % (ref 3–6)
ALPHA2 GLOB SERPL ELPH-MCNC: 0.7 G/DL (ref 0.5–0.9)
B-GLOBULIN SERPL ELPH-MCNC: 0.7 G/DL (ref 0.5–1.1)
B-GLOBULIN SERPL ELPH-MCNC: 10 % (ref 11–19)
GAMMA GLOB SERPL ELPH-MCNC: 0.7 G/DL (ref 0.5–1.5)
GAMMA GLOBULIN %: 10 % (ref 9–20)
INTERPRETATION SERPL IFE-IMP: NORMAL
PATH REV: NORMAL
PATHOLOGIST: ABNORMAL
PROT PATTERN SERPL ELPH-IMP: ABNORMAL
PROT SERPL-MCNC: 6.8 G/DL (ref 6.4–8.3)
TOTAL PROT. SUM,%: 102 % (ref 98–102)
TOTAL PROT. SUM: 7 G/DL (ref 6.3–8.2)

## 2024-02-28 LAB — EPO SERPL-ACNC: 78 MU/ML (ref 4–27)

## 2024-03-05 LAB — ECHO BSA: 1.96 M2

## 2024-03-06 ENCOUNTER — OFFICE VISIT (OUTPATIENT)
Dept: ONCOLOGY | Age: 89
End: 2024-03-06
Payer: MEDICARE

## 2024-03-06 ENCOUNTER — TELEPHONE (OUTPATIENT)
Dept: ONCOLOGY | Age: 89
End: 2024-03-06

## 2024-03-06 VITALS
RESPIRATION RATE: 18 BRPM | SYSTOLIC BLOOD PRESSURE: 114 MMHG | HEART RATE: 57 BPM | WEIGHT: 179.9 LBS | TEMPERATURE: 97.8 F | BODY MASS INDEX: 29.94 KG/M2 | DIASTOLIC BLOOD PRESSURE: 66 MMHG

## 2024-03-06 DIAGNOSIS — D47.2 MGUS (MONOCLONAL GAMMOPATHY OF UNKNOWN SIGNIFICANCE): Primary | ICD-10-CM

## 2024-03-06 PROCEDURE — 1123F ACP DISCUSS/DSCN MKR DOCD: CPT | Performed by: INTERNAL MEDICINE

## 2024-03-06 PROCEDURE — 1036F TOBACCO NON-USER: CPT | Performed by: INTERNAL MEDICINE

## 2024-03-06 PROCEDURE — G8427 DOCREV CUR MEDS BY ELIG CLIN: HCPCS | Performed by: INTERNAL MEDICINE

## 2024-03-06 PROCEDURE — 99211 OFF/OP EST MAY X REQ PHY/QHP: CPT | Performed by: INTERNAL MEDICINE

## 2024-03-06 PROCEDURE — G8419 CALC BMI OUT NRM PARAM NOF/U: HCPCS | Performed by: INTERNAL MEDICINE

## 2024-03-06 PROCEDURE — 99214 OFFICE O/P EST MOD 30 MIN: CPT | Performed by: INTERNAL MEDICINE

## 2024-03-06 PROCEDURE — G8484 FLU IMMUNIZE NO ADMIN: HCPCS | Performed by: INTERNAL MEDICINE

## 2024-03-06 NOTE — PROGRESS NOTES
Patient ID: Duane Adamczak, 1935, [id number], 88 y.o.  Referred by :No referring provider defined for this encounter.   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 male with a past medical history of atrial fibrillation, colon cancer, CHF, CKD, anemia was seen during initial consultation visit for MGUS.    Patient has a history of colon cancer diagnosed in May 2020 status post resection at [facility] under care of Dr. Ash.  Based on family, he did not need chemotherapy and currently in remission for that and following with his colorectal surgeon.  He did not see medical oncology at that time.    Patient recently was admitted to hospital in November with atrial fibrillation and acute kidney injury.  As per family patient has history of atrial fibrillation in the past and was following with cardiology but his Eliquis was taken off after his colon surgery.  When he was in the hospital in November his Eliquis was resumed as he was in atrial fibrillation.  During the hospitalization he had a CT of the chest with contrast.  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    Patient is accompanied by his daughter during today's office visit.  He lives with his wife in the independent living facility.  He denies any new bone pain back pain.  He does have mild anemia.  His recent lab work with his PCP showed normal iron studies.  He has a mildly low B12 and he is planning to start over-the-counter B12 supplements 1.  Interval history:  Patient is returning for follow-up visit and to discuss lab results and further recommendations.  His

## 2024-03-06 NOTE — TELEPHONE ENCOUNTER
DUANE CAME IN FOR MD VISIT   RTC in 4 months with labs  PATIENT SCHEDULED NEXT FOLLOW UP ON 8/14 @ 10  PATIENT GOING ON VACATION AND NEEDED FOLLOW UP IN AUG.   LAB ORDERS GIVEN TO PATIENT   AVS PRINTED AND GIVEN TO PATIENT ON EXIT

## 2024-04-26 ENCOUNTER — HOSPITAL ENCOUNTER (OUTPATIENT)
Age: 89
Setting detail: SPECIMEN
Discharge: HOME | End: 2024-04-26

## 2024-04-26 DIAGNOSIS — N18.30 STAGE 3 CHRONIC KIDNEY DISEASE, UNSPECIFIED WHETHER STAGE 3A OR 3B CKD (HCC): ICD-10-CM

## 2024-04-26 DIAGNOSIS — R60.0 BILATERAL LOWER EXTREMITY EDEMA: ICD-10-CM

## 2024-04-26 DIAGNOSIS — I10 ESSENTIAL HYPERTENSION: ICD-10-CM

## 2024-04-26 LAB
25(OH)D3 SERPL-MCNC: 33.3 NG/ML (ref 30–100)
ALBUMIN SERPL-MCNC: 4.6 G/DL (ref 3.5–5.2)
ANION GAP SERPL CALCULATED.3IONS-SCNC: 13 MMOL/L (ref 9–16)
BUN SERPL-MCNC: 29 MG/DL (ref 8–23)
CALCIUM SERPL-MCNC: 9.4 MG/DL (ref 8.6–10.4)
CHLORIDE SERPL-SCNC: 105 MMOL/L (ref 98–107)
CO2 SERPL-SCNC: 26 MMOL/L (ref 20–31)
CREAT SERPL-MCNC: 1.4 MG/DL (ref 0.7–1.2)
CREAT UR-MCNC: 171 MG/DL (ref 39–259)
ERYTHROCYTE [DISTWIDTH] IN BLOOD BY AUTOMATED COUNT: 15.7 % (ref 11.8–14.4)
GFR SERPL CREATININE-BSD FRML MDRD: 48 ML/MIN/1.73M2
GLUCOSE SERPL-MCNC: 127 MG/DL (ref 74–99)
HCT VFR BLD AUTO: 32.5 % (ref 40.7–50.3)
HGB BLD-MCNC: 10.5 G/DL (ref 13–17)
MCH RBC QN AUTO: 33.8 PG (ref 25.2–33.5)
MCHC RBC AUTO-ENTMCNC: 32.3 G/DL (ref 28.4–34.8)
MCV RBC AUTO: 104.5 FL (ref 82.6–102.9)
NRBC BLD-RTO: 0 PER 100 WBC
PHOSPHATE SERPL-MCNC: 3.2 MG/DL (ref 2.5–4.5)
PLATELET # BLD AUTO: 218 K/UL (ref 138–453)
PMV BLD AUTO: 10.7 FL (ref 8.1–13.5)
POTASSIUM SERPL-SCNC: 3.8 MMOL/L (ref 3.7–5.3)
PTH-INTACT SERPL-MCNC: 98 PG/ML (ref 15–65)
RBC # BLD AUTO: 3.11 M/UL (ref 4.21–5.77)
SODIUM SERPL-SCNC: 144 MMOL/L (ref 136–145)
TOTAL PROTEIN, URINE: 79 MG/DL
URINE TOTAL PROTEIN CREATININE RATIO: 0.46
WBC OTHER # BLD: 7.5 K/UL (ref 3.5–11.3)

## 2024-06-06 ENCOUNTER — OFFICE VISIT (OUTPATIENT)
Dept: SURGERY | Age: 89
End: 2024-06-06
Payer: MEDICARE

## 2024-06-06 ENCOUNTER — HOSPITAL ENCOUNTER (OUTPATIENT)
Age: 89
Setting detail: SPECIMEN
Discharge: HOME | End: 2024-06-06

## 2024-06-06 VITALS
HEIGHT: 65 IN | SYSTOLIC BLOOD PRESSURE: 135 MMHG | DIASTOLIC BLOOD PRESSURE: 65 MMHG | BODY MASS INDEX: 29.89 KG/M2 | HEART RATE: 65 BPM | RESPIRATION RATE: 18 BRPM | WEIGHT: 179.4 LBS | OXYGEN SATURATION: 95 %

## 2024-06-06 DIAGNOSIS — Z85.038 HISTORY OF COLON CANCER IN ADULTHOOD: Primary | ICD-10-CM

## 2024-06-06 DIAGNOSIS — Z85.038 HISTORY OF COLON CANCER IN ADULTHOOD: ICD-10-CM

## 2024-06-06 LAB — CEA SERPL-MCNC: 1.8 NG/ML (ref 0–3.8)

## 2024-06-06 PROCEDURE — G8419 CALC BMI OUT NRM PARAM NOF/U: HCPCS | Performed by: COLON & RECTAL SURGERY

## 2024-06-06 PROCEDURE — 1123F ACP DISCUSS/DSCN MKR DOCD: CPT | Performed by: COLON & RECTAL SURGERY

## 2024-06-06 PROCEDURE — 1036F TOBACCO NON-USER: CPT | Performed by: COLON & RECTAL SURGERY

## 2024-06-06 PROCEDURE — 99213 OFFICE O/P EST LOW 20 MIN: CPT | Performed by: COLON & RECTAL SURGERY

## 2024-06-06 PROCEDURE — G8427 DOCREV CUR MEDS BY ELIG CLIN: HCPCS | Performed by: COLON & RECTAL SURGERY

## 2024-06-06 ASSESSMENT — ENCOUNTER SYMPTOMS
WHEEZING: 0
ANAL BLEEDING: 0
BACK PAIN: 0
RECTAL PAIN: 0
CONSTIPATION: 0
COUGH: 0
COLOR CHANGE: 0
SHORTNESS OF BREATH: 0
ABDOMINAL DISTENTION: 0
APNEA: 0
BLOOD IN STOOL: 0
NAUSEA: 0
DIARRHEA: 0
VOMITING: 0
CHEST TIGHTNESS: 0
STRIDOR: 0
ABDOMINAL PAIN: 0

## 2024-06-06 NOTE — PROGRESS NOTES
[facility] PHYSICIANS [facility]  [address] [id number]  Dept: [phone]    Patient:  Duane Adamczak  YOB: 1935  Date: 6/6/2024     The patient is a 88 y.o. male who presents today for consult of the following problems:     Chief Complaint: Follow-up (Patient is here for 6 month follow up due to history of Colon Cancer.)       HPI:     Name of surgery: Segmental Colon Resection  Surgery date: 06/23/2020  Last colonoscopy: 07/26/2023 Dr. Carrera  Last imaging:    CT - 11/23/2023  CEA: 2.5 12/01/2023    This pleasant 88-year-old  male patient presents today for scheduled follow-up for cancer surveillance.  He is doing very well from the cancer standpoint but she has several medical comorbidities.  He was pursuing his colonoscopy needs with [facility] GI team.  They have decided that is no longer useful to do anymore colonoscopies routinely because of his age and medical comorbidities.  Patient denies any GI related symptoms such as rectal bleeding abdominal pain etc.    History:     Past Medical History:   Diagnosis Date    Abnormal EKG 12/30/2022    at [facility]    Anesthesia complication     per daughter, often has \"delerium\" post anesthesia for weeks    Atrial fibrillation (HCC)     cardioverted to rsr    CAD (coronary artery disease)     Cancer (HCC)     colon w/ resection    CHF (congestive heart failure) (HCC)     CKD (chronic kidney disease)     Colon cancer (HCC)     COVID-19     not hospitalized   had cough  3/17/2023   received antiviral and it was \"mild\" per daughter.    Diabetes mellitus (HCC)     pcp manages    Dribbling urine     Forgetfulness     per daughter, he forgets his meds for days at a time    Gout     H/O cataract     H/O migraine     no longer a problem    H/O prostate cancer     Hematuria     HTN (hypertension)     Hyperlipidemia     Kidney stones     SUKUMAR (obstructive sleep apnea)

## 2024-07-24 ENCOUNTER — HOSPITAL ENCOUNTER (OUTPATIENT)
Age: 89
Setting detail: SPECIMEN
Discharge: HOME | End: 2024-07-24

## 2024-07-24 DIAGNOSIS — N18.32 STAGE 3B CHRONIC KIDNEY DISEASE (HCC): ICD-10-CM

## 2024-07-24 LAB
CA-I BLD-SCNC: 1.25 MMOL/L (ref 1.13–1.33)
CREAT UR-MCNC: 169 MG/DL (ref 39–259)
PTH-INTACT SERPL-MCNC: 99 PG/ML (ref 15–65)
TOTAL PROTEIN, URINE: 67 MG/DL

## 2024-07-26 LAB
25(OH)D3 SERPL-MCNC: 38.9 NG/ML (ref 30–100)
ANION GAP SERPL CALCULATED.3IONS-SCNC: 18 MMOL/L (ref 9–16)
BUN SERPL-MCNC: 24 MG/DL (ref 8–23)
CALCIUM SERPL-MCNC: 9.5 MG/DL (ref 8.6–10.4)
CHLORIDE SERPL-SCNC: 102 MMOL/L (ref 98–107)
CO2 SERPL-SCNC: 21 MMOL/L (ref 20–31)
CREAT SERPL-MCNC: 1.5 MG/DL (ref 0.7–1.2)
GFR, ESTIMATED: 44 ML/MIN/1.73M2
GLUCOSE SERPL-MCNC: 110 MG/DL (ref 74–99)
PHOSPHATE SERPL-MCNC: 3.6 MG/DL (ref 2.5–4.5)
POTASSIUM SERPL-SCNC: 4.3 MMOL/L (ref 3.7–5.3)
SODIUM SERPL-SCNC: 141 MMOL/L (ref 136–145)

## 2024-08-13 ENCOUNTER — HOSPITAL ENCOUNTER (OUTPATIENT)
Age: 89
Setting detail: SPECIMEN
Discharge: HOME | End: 2024-08-13

## 2024-08-13 DIAGNOSIS — D47.2 MGUS (MONOCLONAL GAMMOPATHY OF UNKNOWN SIGNIFICANCE): ICD-10-CM

## 2024-08-13 LAB
ALBUMIN SERPL-MCNC: 4.6 G/DL (ref 3.5–5.2)
ALBUMIN/GLOB SERPL: 2 {RATIO} (ref 1–2.5)
ALP SERPL-CCNC: 148 U/L (ref 40–129)
ALT SERPL-CCNC: 28 U/L (ref 10–50)
ANION GAP SERPL CALCULATED.3IONS-SCNC: 14 MMOL/L (ref 9–16)
AST SERPL-CCNC: 33 U/L (ref 10–50)
BASOPHILS # BLD: 0.1 K/UL (ref 0–0.2)
BASOPHILS NFR BLD: 1 % (ref 0–2)
BILIRUB SERPL-MCNC: 1.9 MG/DL (ref 0–1.2)
BUN SERPL-MCNC: 28 MG/DL (ref 8–23)
CALCIUM SERPL-MCNC: 9.5 MG/DL (ref 8.6–10.4)
CHLORIDE SERPL-SCNC: 103 MMOL/L (ref 98–107)
CO2 SERPL-SCNC: 26 MMOL/L (ref 20–31)
CREAT SERPL-MCNC: 1.6 MG/DL (ref 0.7–1.2)
EOSINOPHIL # BLD: 0.1 K/UL (ref 0–0.44)
EOSINOPHILS RELATIVE PERCENT: 1 % (ref 1–4)
ERYTHROCYTE [DISTWIDTH] IN BLOOD BY AUTOMATED COUNT: 16.3 % (ref 11.8–14.4)
FREE KAPPA/LAMBDA RATIO: 1.61 (ref 0.22–1.74)
GFR, ESTIMATED: 41 ML/MIN/1.73M2
GLUCOSE SERPL-MCNC: 107 MG/DL (ref 74–99)
HCT VFR BLD AUTO: 29.6 % (ref 40.7–50.3)
HGB BLD-MCNC: 9.4 G/DL (ref 13–17)
IGA SERPL-MCNC: 138 MG/DL (ref 70–400)
IGG SERPL-MCNC: 991 MG/DL (ref 700–1600)
IGM SERPL-MCNC: 56 MG/DL (ref 40–230)
IMM GRANULOCYTES # BLD AUTO: 0 K/UL (ref 0–0.3)
IMM GRANULOCYTES NFR BLD: 0 %
KAPPA LC FREE SER-MCNC: 52.5 MG/L
LAMBDA LC FREE SERPL-MCNC: 32.7 MG/L (ref 4.2–27.7)
LYMPHOCYTES NFR BLD: 7.55 K/UL (ref 1.1–3.7)
LYMPHOCYTES RELATIVE PERCENT: 78 % (ref 24–43)
MCH RBC QN AUTO: 33.9 PG (ref 25.2–33.5)
MCHC RBC AUTO-ENTMCNC: 31.8 G/DL (ref 28.4–34.8)
MCV RBC AUTO: 106.9 FL (ref 82.6–102.9)
MONOCYTES NFR BLD: 0.49 K/UL (ref 0.1–1.2)
MONOCYTES NFR BLD: 5 % (ref 3–12)
MORPHOLOGY: ABNORMAL
MORPHOLOGY: ABNORMAL
NEUTROPHILS NFR BLD: 15 % (ref 36–65)
NEUTS SEG NFR BLD: 1.46 K/UL (ref 1.5–8.1)
NRBC BLD-RTO: 0 PER 100 WBC
PLATELET # BLD AUTO: 267 K/UL (ref 138–453)
PMV BLD AUTO: 10.5 FL (ref 8.1–13.5)
POTASSIUM SERPL-SCNC: 4 MMOL/L (ref 3.7–5.3)
PROT SERPL-MCNC: 7 G/DL (ref 6.6–8.7)
RBC # BLD AUTO: 2.77 M/UL (ref 4.21–5.77)
SODIUM SERPL-SCNC: 143 MMOL/L (ref 136–145)
WBC OTHER # BLD: 9.7 K/UL (ref 3.5–11.3)

## 2024-08-14 ENCOUNTER — TELEPHONE (OUTPATIENT)
Dept: ONCOLOGY | Age: 89
End: 2024-08-14

## 2024-08-14 ENCOUNTER — OFFICE VISIT (OUTPATIENT)
Dept: ONCOLOGY | Age: 89
End: 2024-08-14
Payer: MEDICARE

## 2024-08-14 VITALS
WEIGHT: 181 LBS | BODY MASS INDEX: 30.12 KG/M2 | RESPIRATION RATE: 16 BRPM | SYSTOLIC BLOOD PRESSURE: 104 MMHG | HEART RATE: 80 BPM | TEMPERATURE: 97.2 F | DIASTOLIC BLOOD PRESSURE: 53 MMHG

## 2024-08-14 DIAGNOSIS — D47.2 MGUS (MONOCLONAL GAMMOPATHY OF UNKNOWN SIGNIFICANCE): Primary | ICD-10-CM

## 2024-08-14 PROCEDURE — 1123F ACP DISCUSS/DSCN MKR DOCD: CPT | Performed by: INTERNAL MEDICINE

## 2024-08-14 PROCEDURE — 1036F TOBACCO NON-USER: CPT | Performed by: INTERNAL MEDICINE

## 2024-08-14 PROCEDURE — G8427 DOCREV CUR MEDS BY ELIG CLIN: HCPCS | Performed by: INTERNAL MEDICINE

## 2024-08-14 PROCEDURE — 99214 OFFICE O/P EST MOD 30 MIN: CPT | Performed by: INTERNAL MEDICINE

## 2024-08-14 PROCEDURE — 99211 OFF/OP EST MAY X REQ PHY/QHP: CPT

## 2024-08-14 PROCEDURE — G8417 CALC BMI ABV UP PARAM F/U: HCPCS | Performed by: INTERNAL MEDICINE

## 2024-08-14 RX ORDER — LANOLIN ALCOHOL/MO/W.PET/CERES
1000 CREAM (GRAM) TOPICAL DAILY
COMMUNITY

## 2024-08-14 NOTE — RESEARCH
Research Study: [id number]      10:55 am Met with patient, patient was accompanied by his daughter, in conference room after Dr. Thompson presented and explained clinical trial [id number], Smooth Surveillance, Contact, and Research for MGUS, Myeloma and Amyloidosis. Informed consent form reviewed with patient. All of patient's questions were answered to his satisfaction. Patient understands his participation is voluntary. Patient verbalizes wants to participate in study. Patient signed consent form and HIPAA form. Copy of signed consent and HIPAA forms given to patient along with contact information for research nurse. Encouraged patient to call with any questions and patient verbalizes understanding. Electronically signed by Mandy Tierney RN on 8/14/2024 at 11:53 AM

## 2024-08-14 NOTE — TELEPHONE ENCOUNTER
DUANE HERE FOR FOLLOW UP   RTC in 6 months w labs a week prior  LABS ORDERED: IMMUNOTYPING SERUM, IMMUNOGLOBULIN PANEL, CMP, CBC, FREE LIGHT CHAINS, ELECTROPHORESIS PROTEIN   MD VISIT: 2/12/25 @ 11AM   LABS PRINTED AND GIVEN ON EXIT   AVS PRINTED AND GIVEN ON EXIT

## 2024-08-14 NOTE — PROGRESS NOTES
Patient ID: Duane Adamczak, 1935, [id number], 89 y.o.  Referred by :No referring provider defined for this encounter.   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 male with a past medical history of atrial fibrillation, colon cancer, CHF, CKD, anemia was seen during initial consultation visit for MGUS.    Patient has a history of colon cancer diagnosed in May 2020 status post resection at [facility] under care of Dr. Ash.  Based on family, he did not need chemotherapy and currently in remission for that and following with his colorectal surgeon.  He did not see medical oncology at that time.    Patient recently was admitted to hospital in November with atrial fibrillation and acute kidney injury.  As per family patient has history of atrial fibrillation in the past and was following with cardiology but his Eliquis was taken off after his colon surgery.  When he was in the hospital in November his Eliquis was resumed as he was in atrial fibrillation.  During the hospitalization he had a CT of the chest with contrast.  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    Patient is accompanied by his daughter during today's office visit.  He lives with his wife in the independent living facility.  He denies any new bone pain back pain.  He does have mild anemia.  His recent lab work with his PCP showed normal iron studies.  He has a mildly low B12 and he is planning to start over-the-counter B12 supplements 1.  Interval history:  Patient is return for follow-up visit and to discuss lab results and further recommendations.  His

## 2024-08-15 LAB
ALBUMIN PERCENT: 63 % (ref 45–65)
ALBUMIN SERPL-MCNC: 4.2 G/DL (ref 3.2–5.2)
ALPHA 2 PERCENT: 10 % (ref 6–13)
ALPHA1 GLOB SERPL ELPH-MCNC: 0.3 G/DL (ref 0.1–0.4)
ALPHA1 GLOB SERPL ELPH-MCNC: 5 % (ref 3–6)
ALPHA2 GLOB SERPL ELPH-MCNC: 0.7 G/DL (ref 0.5–0.9)
B-GLOBULIN SERPL ELPH-MCNC: 0.7 G/DL (ref 0.5–1.1)
B-GLOBULIN SERPL ELPH-MCNC: 10 % (ref 11–19)
GAMMA GLOB SERPL ELPH-MCNC: 0.8 G/DL (ref 0.5–1.5)
GAMMA GLOBULIN %: 13 % (ref 9–20)
ITYP INTERPRETATION: NORMAL
PATH REV: NORMAL
PATHOLOGIST: ABNORMAL
PROT PATTERN SERPL ELPH-IMP: ABNORMAL
PROT SERPL-MCNC: 6.7 G/DL (ref 6.6–8.7)
TOTAL PROT. SUM,%: 101 % (ref 98–102)
TOTAL PROT. SUM: 6.7 G/DL (ref 6.3–8.2)

## 2024-10-09 ENCOUNTER — HOSPITAL ENCOUNTER (OUTPATIENT)
Age: 89
Setting detail: SPECIMEN
Discharge: HOME | End: 2024-10-09

## 2024-10-09 LAB
25(OH)D3 SERPL-MCNC: 34.1 NG/ML (ref 30–100)
ALBUMIN SERPL-MCNC: 4.5 G/DL (ref 3.5–5.2)
ANION GAP SERPL CALCULATED.3IONS-SCNC: 12 MMOL/L (ref 9–16)
BUN SERPL-MCNC: 27 MG/DL (ref 8–23)
CALCIUM SERPL-MCNC: 9.3 MG/DL (ref 8.6–10.4)
CHLORIDE SERPL-SCNC: 105 MMOL/L (ref 98–107)
CO2 SERPL-SCNC: 26 MMOL/L (ref 20–31)
CREAT SERPL-MCNC: 1.7 MG/DL (ref 0.7–1.2)
CREAT UR-MCNC: 50.6 MG/DL (ref 39–259)
ERYTHROCYTE [DISTWIDTH] IN BLOOD BY AUTOMATED COUNT: 15.9 % (ref 11.8–14.4)
GFR, ESTIMATED: 39 ML/MIN/1.73M2
GLUCOSE SERPL-MCNC: 101 MG/DL (ref 74–99)
HCT VFR BLD AUTO: 27.5 % (ref 40.7–50.3)
HGB BLD-MCNC: 8.9 G/DL (ref 13–17)
MCH RBC QN AUTO: 35.3 PG (ref 25.2–33.5)
MCHC RBC AUTO-ENTMCNC: 32.4 G/DL (ref 28.4–34.8)
MCV RBC AUTO: 109.1 FL (ref 82.6–102.9)
NRBC BLD-RTO: 0 PER 100 WBC
PHOSPHATE SERPL-MCNC: 3.2 MG/DL (ref 2.5–4.5)
PLATELET # BLD AUTO: 245 K/UL (ref 138–453)
PMV BLD AUTO: 10.6 FL (ref 8.1–13.5)
POTASSIUM SERPL-SCNC: 3.9 MMOL/L (ref 3.7–5.3)
PTH-INTACT SERPL-MCNC: 116 PG/ML (ref 15–65)
RBC # BLD AUTO: 2.52 M/UL (ref 4.21–5.77)
SODIUM SERPL-SCNC: 143 MMOL/L (ref 136–145)
TOTAL PROTEIN, URINE: 13 MG/DL
URINE TOTAL PROTEIN CREATININE RATIO: 0.26
WBC OTHER # BLD: 11.1 K/UL (ref 3.5–11.3)

## 2025-01-06 ENCOUNTER — HOSPITAL ENCOUNTER (OUTPATIENT)
Dept: CT IMAGING | Age: 89
Discharge: HOME | End: 2025-01-08
Attending: COLON & RECTAL SURGERY
Payer: MEDICARE

## 2025-01-06 DIAGNOSIS — Z85.038 HISTORY OF COLON CANCER IN ADULTHOOD: ICD-10-CM

## 2025-01-06 PROCEDURE — 74176 CT ABD & PELVIS W/O CONTRAST: CPT

## 2025-01-08 DIAGNOSIS — Z85.038 HISTORY OF COLON CANCER IN ADULTHOOD: Primary | ICD-10-CM

## 2025-02-04 ENCOUNTER — HOSPITAL ENCOUNTER (OUTPATIENT)
Age: 89
Setting detail: SPECIMEN
Discharge: HOME | End: 2025-02-04

## 2025-02-04 DIAGNOSIS — D47.2 MGUS (MONOCLONAL GAMMOPATHY OF UNKNOWN SIGNIFICANCE): ICD-10-CM

## 2025-02-04 LAB
ALBUMIN SERPL-MCNC: 4.5 G/DL (ref 3.5–5.2)
ALBUMIN/GLOB SERPL: 1.8 {RATIO} (ref 1–2.5)
ALP SERPL-CCNC: 161 U/L (ref 40–129)
ALT SERPL-CCNC: 28 U/L (ref 10–50)
ANION GAP SERPL CALCULATED.3IONS-SCNC: 13 MMOL/L (ref 9–16)
AST SERPL-CCNC: 30 U/L (ref 10–50)
BASOPHILS # BLD: 0.14 K/UL (ref 0–0.2)
BASOPHILS NFR BLD: 1 % (ref 0–2)
BILIRUB SERPL-MCNC: 2 MG/DL (ref 0–1.2)
BUN SERPL-MCNC: 26 MG/DL (ref 8–23)
CALCIUM SERPL-MCNC: 9.7 MG/DL (ref 8.6–10.4)
CHLORIDE SERPL-SCNC: 102 MMOL/L (ref 98–107)
CO2 SERPL-SCNC: 26 MMOL/L (ref 20–31)
CREAT SERPL-MCNC: 1.4 MG/DL (ref 0.7–1.2)
EOSINOPHIL # BLD: 0.27 K/UL (ref 0–0.4)
EOSINOPHILS RELATIVE PERCENT: 2 % (ref 1–4)
ERYTHROCYTE [DISTWIDTH] IN BLOOD BY AUTOMATED COUNT: 16.3 % (ref 11.8–14.4)
FREE KAPPA/LAMBDA RATIO: 1.29 (ref 0.22–1.74)
GFR, ESTIMATED: 49 ML/MIN/1.73M2
GLUCOSE SERPL-MCNC: 142 MG/DL (ref 74–99)
HCT VFR BLD AUTO: 28.2 % (ref 40.7–50.3)
HGB BLD-MCNC: 9.1 G/DL (ref 13–17)
IGA SERPL-MCNC: 126 MG/DL (ref 70–400)
IGG SERPL-MCNC: 1021 MG/DL (ref 700–1600)
IGM SERPL-MCNC: 57 MG/DL (ref 40–230)
IMM GRANULOCYTES # BLD AUTO: 0 K/UL (ref 0–0.3)
IMM GRANULOCYTES NFR BLD: 0 %
KAPPA LC FREE SER-MCNC: 48.3 MG/L
LAMBDA LC FREE SERPL-MCNC: 37.3 MG/L (ref 4.2–27.7)
LYMPHOCYTES NFR BLD: 11.42 K/UL (ref 1–4.8)
LYMPHOCYTES RELATIVE PERCENT: 84 % (ref 24–44)
MCH RBC QN AUTO: 35 PG (ref 25.2–33.5)
MCHC RBC AUTO-ENTMCNC: 32.3 G/DL (ref 28.4–34.8)
MCV RBC AUTO: 108.5 FL (ref 82.6–102.9)
MONOCYTES NFR BLD: 0.95 K/UL (ref 0.1–0.8)
MONOCYTES NFR BLD: 7 % (ref 1–7)
MORPHOLOGY: ABNORMAL
MORPHOLOGY: ABNORMAL
NEUTROPHILS NFR BLD: 6 % (ref 36–66)
NEUTS SEG NFR BLD: 0.82 K/UL (ref 1.8–7.7)
NRBC BLD-RTO: 0 PER 100 WBC
PLATELET # BLD AUTO: 264 K/UL (ref 138–453)
PMV BLD AUTO: 10.2 FL (ref 8.1–13.5)
POTASSIUM SERPL-SCNC: 3.8 MMOL/L (ref 3.7–5.3)
PROT SERPL-MCNC: 7 G/DL (ref 6.6–8.7)
RBC # BLD AUTO: 2.6 M/UL (ref 4.21–5.77)
SODIUM SERPL-SCNC: 141 MMOL/L (ref 136–145)
WBC OTHER # BLD: 13.6 K/UL (ref 3.5–11.3)

## 2025-02-05 LAB
ALBUMIN PERCENT: 61 % (ref 56–66)
ALBUMIN SERPL-MCNC: 4.2 G/DL (ref 3.2–5.2)
ALPHA 2 PERCENT: 10 % (ref 7–12)
ALPHA1 GLOB SERPL ELPH-MCNC: 0.3 G/DL (ref 0.1–0.4)
ALPHA1 GLOB SERPL ELPH-MCNC: 5 % (ref 3–5)
ALPHA2 GLOB SERPL ELPH-MCNC: 0.7 G/DL (ref 0.5–0.9)
B-GLOBULIN SERPL ELPH-MCNC: 0.7 G/DL (ref 0.7–1.4)
B-GLOBULIN SERPL ELPH-MCNC: 11 % (ref 8–13)
GAMMA GLOB SERPL ELPH-MCNC: 0.9 G/DL (ref 0.5–1.5)
GAMMA GLOBULIN %: 14 % (ref 11–19)
ITYP INTERPRETATION: NORMAL
PATH REV: NORMAL
PATHOLOGIST: NORMAL
PROT PATTERN SERPL ELPH-IMP: NORMAL
PROT SERPL-MCNC: 6.8 G/DL (ref 6.6–8.7)
TOTAL PROT. SUM,%: 101 % (ref 98–102)
TOTAL PROT. SUM: 6.8 G/DL (ref 6.3–8.2)

## 2025-02-12 ENCOUNTER — TELEPHONE (OUTPATIENT)
Dept: ONCOLOGY | Age: 89
End: 2025-02-12

## 2025-02-12 ENCOUNTER — HOSPITAL ENCOUNTER (OUTPATIENT)
Age: 89
Discharge: HOME | End: 2025-02-12
Payer: MEDICARE

## 2025-02-12 ENCOUNTER — OFFICE VISIT (OUTPATIENT)
Dept: ONCOLOGY | Age: 89
End: 2025-02-12
Payer: MEDICARE

## 2025-02-12 VITALS
BODY MASS INDEX: 30.67 KG/M2 | RESPIRATION RATE: 16 BRPM | TEMPERATURE: 98 F | SYSTOLIC BLOOD PRESSURE: 119 MMHG | HEART RATE: 67 BPM | WEIGHT: 184.3 LBS | DIASTOLIC BLOOD PRESSURE: 67 MMHG

## 2025-02-12 DIAGNOSIS — D64.9 ANEMIA, UNSPECIFIED TYPE: ICD-10-CM

## 2025-02-12 DIAGNOSIS — Z85.038 HISTORY OF COLON CANCER: ICD-10-CM

## 2025-02-12 DIAGNOSIS — D47.2 MGUS (MONOCLONAL GAMMOPATHY OF UNKNOWN SIGNIFICANCE): Primary | ICD-10-CM

## 2025-02-12 DIAGNOSIS — D64.89 ANEMIA DUE TO OTHER CAUSE, NOT CLASSIFIED: ICD-10-CM

## 2025-02-12 LAB
BASOPHILS # BLD: 0 K/UL (ref 0–0.2)
BASOPHILS NFR BLD: 0 %
EOSINOPHIL # BLD: 0.11 K/UL (ref 0–0.4)
EOSINOPHILS RELATIVE PERCENT: 1 % (ref 1–4)
ERYTHROCYTE [DISTWIDTH] IN BLOOD BY AUTOMATED COUNT: 16.4 % (ref 11.8–14.4)
FERRITIN SERPL-MCNC: 518 NG/ML
FOLATE SERPL-MCNC: 30.9 NG/ML (ref 4.8–24.2)
HAPTOGLOB SERPL-MCNC: 124 MG/DL (ref 30–200)
HCT VFR BLD AUTO: 27.7 % (ref 40.7–50.3)
HGB BLD-MCNC: 9.2 G/DL (ref 13–17)
IMM GRANULOCYTES # BLD AUTO: 0.11 K/UL (ref 0–0.3)
IMM GRANULOCYTES NFR BLD: 1 %
IMM RETICS NFR: 29.2 % (ref 2.7–18.3)
IRON SATN MFR SERPL: 39 % (ref 20–55)
IRON SERPL-MCNC: 100 UG/DL (ref 61–157)
LYMPHOCYTES NFR BLD: 9.11 K/UL (ref 1–4.8)
LYMPHOCYTES RELATIVE PERCENT: 82 % (ref 24–44)
MCH RBC QN AUTO: 36.5 PG (ref 25.2–33.5)
MCHC RBC AUTO-ENTMCNC: 33.2 G/DL (ref 28.4–34.8)
MCV RBC AUTO: 109.9 FL (ref 82.6–102.9)
MONOCYTES NFR BLD: 0.44 K/UL (ref 0.2–0.8)
MONOCYTES NFR BLD: 4 % (ref 1–7)
MORPHOLOGY: ABNORMAL
MORPHOLOGY: ABNORMAL
NEUTROPHILS NFR BLD: 12 % (ref 36–66)
NEUTS SEG NFR BLD: 1.33 K/UL (ref 1.8–7.7)
NRBC BLD-RTO: 0 PER 100 WBC
PLATELET # BLD AUTO: 248 K/UL (ref 138–453)
PMV BLD AUTO: 9.9 FL (ref 8.1–13.5)
RBC # BLD AUTO: 2.52 M/UL (ref 4.21–5.77)
RETIC HEMOGLOBIN: 36.7 PG (ref 28.2–35.7)
RETICS # AUTO: 0.06 M/UL (ref 0.03–0.08)
RETICS/RBC NFR AUTO: 2.4 % (ref 0.5–1.9)
TIBC SERPL-MCNC: 258 UG/DL (ref 250–450)
UNSATURATED IRON BINDING CAPACITY: 158 UG/DL (ref 112–347)
VIT B12 SERPL-MCNC: 910 PG/ML (ref 232–1245)
WBC OTHER # BLD: 11.1 K/UL (ref 3.5–11.3)

## 2025-02-12 PROCEDURE — 82668 ASSAY OF ERYTHROPOIETIN: CPT

## 2025-02-12 PROCEDURE — 99211 OFF/OP EST MAY X REQ PHY/QHP: CPT | Performed by: INTERNAL MEDICINE

## 2025-02-12 PROCEDURE — 85025 COMPLETE CBC W/AUTO DIFF WBC: CPT

## 2025-02-12 PROCEDURE — 85045 AUTOMATED RETICULOCYTE COUNT: CPT

## 2025-02-12 PROCEDURE — 83010 ASSAY OF HAPTOGLOBIN QUANT: CPT

## 2025-02-12 PROCEDURE — 83540 ASSAY OF IRON: CPT

## 2025-02-12 PROCEDURE — 36415 COLL VENOUS BLD VENIPUNCTURE: CPT

## 2025-02-12 PROCEDURE — 82607 VITAMIN B-12: CPT

## 2025-02-12 PROCEDURE — 82728 ASSAY OF FERRITIN: CPT

## 2025-02-12 PROCEDURE — 83550 IRON BINDING TEST: CPT

## 2025-02-12 PROCEDURE — 88185 FLOWCYTOMETRY/TC ADD-ON: CPT

## 2025-02-12 PROCEDURE — 82746 ASSAY OF FOLIC ACID SERUM: CPT

## 2025-02-12 PROCEDURE — 88184 FLOWCYTOMETRY/ TC 1 MARKER: CPT

## 2025-02-12 NOTE — PROGRESS NOTES
No bowel obstruction with diverticulosis of the colon specially  in the region of the sigmoid colon with no pericolonic inflammatory changes.    Pelvis: No pelvic adenopathy.  Urinary bladder as visualized demonstrates  normal findings.  Prostate gland is absent.  No pelvic adenopathy. a    Peritoneum/Retroperitoneum:   No retroperitoneal adenopathy.  Abdominal aorta  demonstrates no aneurysm.  No free intraperitoneal fluid.    Bones/Soft Tissues:   Diffuse degenerative changes in the lumbosacral spine.  No lytic or blastic changes.  Impression: Stable right upper lung nodule with improved aeration in the lower lungs when  compared to the prior study.  No pleural effusion on current study.    Global cardiomegaly with known small pericardial effusion.    Stable known multiple renal cysts some of them demonstrates thin  calcifications.    Post cholecystectomy clips.    Post prostatectomy change.    Diffuse degenerative changes in the dorsal lumbar spine.    Given the lack of intravenous contrast media there is no evidence of  metastasis.    ASSESSMENT:      Duane Adamczak is a 89 y.o. male with a past medical history of diabetes, hypertension, atrial fibrillation, obstructive sleep apnea, Parkinson's disease with chronic kidney disease, anemia and mild MGUS.  I reviewed the laboratory, imaging studies, prior records, discussed diagnosis, prognosis and treatment recommendations.    MGUS: Overall he appears to have very mild MGUS with monoclonal protein at 0.04 g/dL and kappa/lambda ratio of 1.9.  His last blood work was in November therefore I will repeat the labs in about 3 weeks.  I do not see any indication for bone marrow biopsy at this point.    Anemia: Most likely secondary to anemia of chronic disease.  Also his CKD may be contributing to his anemia.      History of colon cancer: Carcinoma of the transverse colon, T3 N0 M0, diagnosed in 2020, status post resection at [facility] and following with

## 2025-02-12 NOTE — TELEPHONE ENCOUNTER
DUANE HERE FOR FOLLOW UP   Labs today   Stool for OB  RTC 2 janeels  LABS ORDERED: ERYTHROPOIETIN, FERRITIN, IRON & TIBC, VIT B12 & FOLATE, FLOW CYTOMETRY LEUKEMIA, PATH REVIEW SMEAR, HAPTOGLOBIN   MD VISIT: 2/26/25 @ 10AM   SUPPLIES FOR STOOL GIVEN ON EXIT   LABS PRINTED AND DONE ON EXIT   AVS PRINTED AND GIVEN ON EXIT

## 2025-02-13 LAB
FLOW CYTOMETRY BL: NORMAL
SURGICAL PATHOLOGY REPORT: NORMAL

## 2025-02-14 LAB — EPO SERPL-ACNC: 226 MU/ML (ref 4–27)

## 2025-02-15 ENCOUNTER — HOSPITAL ENCOUNTER (OUTPATIENT)
Age: 89
Setting detail: SPECIMEN
Discharge: HOME | End: 2025-02-15
Payer: MEDICARE

## 2025-02-15 DIAGNOSIS — D64.9 ANEMIA, UNSPECIFIED TYPE: ICD-10-CM

## 2025-02-15 LAB — HEMOCCULT SP1 STL QL: NEGATIVE

## 2025-02-15 PROCEDURE — 82272 OCCULT BLD FECES 1-3 TESTS: CPT

## 2025-02-26 ENCOUNTER — TELEPHONE (OUTPATIENT)
Dept: ONCOLOGY | Age: 89
End: 2025-02-26

## 2025-02-26 ENCOUNTER — OFFICE VISIT (OUTPATIENT)
Dept: ONCOLOGY | Age: 89
End: 2025-02-26
Payer: MEDICARE

## 2025-02-26 VITALS
BODY MASS INDEX: 27.89 KG/M2 | RESPIRATION RATE: 16 BRPM | TEMPERATURE: 97.9 F | HEIGHT: 68 IN | WEIGHT: 184 LBS | DIASTOLIC BLOOD PRESSURE: 59 MMHG | HEART RATE: 72 BPM | SYSTOLIC BLOOD PRESSURE: 127 MMHG

## 2025-02-26 DIAGNOSIS — D64.89 ANEMIA DUE TO OTHER CAUSE, NOT CLASSIFIED: Primary | ICD-10-CM

## 2025-02-26 DIAGNOSIS — D47.2 MGUS (MONOCLONAL GAMMOPATHY OF UNKNOWN SIGNIFICANCE): ICD-10-CM

## 2025-02-26 PROCEDURE — G8417 CALC BMI ABV UP PARAM F/U: HCPCS | Performed by: INTERNAL MEDICINE

## 2025-02-26 PROCEDURE — 1036F TOBACCO NON-USER: CPT | Performed by: INTERNAL MEDICINE

## 2025-02-26 PROCEDURE — 99211 OFF/OP EST MAY X REQ PHY/QHP: CPT | Performed by: INTERNAL MEDICINE

## 2025-02-26 PROCEDURE — G8427 DOCREV CUR MEDS BY ELIG CLIN: HCPCS | Performed by: INTERNAL MEDICINE

## 2025-02-26 PROCEDURE — 1159F MED LIST DOCD IN RCRD: CPT | Performed by: INTERNAL MEDICINE

## 2025-02-26 PROCEDURE — 1126F AMNT PAIN NOTED NONE PRSNT: CPT | Performed by: INTERNAL MEDICINE

## 2025-02-26 PROCEDURE — 99204 OFFICE O/P NEW MOD 45 MIN: CPT | Performed by: INTERNAL MEDICINE

## 2025-02-26 PROCEDURE — 1123F ACP DISCUSS/DSCN MKR DOCD: CPT | Performed by: INTERNAL MEDICINE

## 2025-02-26 PROCEDURE — 1160F RVW MEDS BY RX/DR IN RCRD: CPT | Performed by: INTERNAL MEDICINE

## 2025-02-26 NOTE — TELEPHONE ENCOUNTER
DUANE HERE FOR FOLLOW UP   RTC in 6 weeks w madison DE OLIVEIRA VISIT: 4/16/25 @ 8:30AM   LAB PRINTED AND GIVEN ON EXIT   AVS PRINTED AND GIVEN ON EXIT

## 2025-02-26 NOTE — PROGRESS NOTES
recommendations.  His flow cytometry is negative for monoclonal lymphocytes.    His hemoglobin is stable around 9.1.      He is myeloma lab work suggest stable M protein.  Denied any fatigue tiredness, headache dizziness.    He denied any bleeding symptoms.  His stool for occult blood has been negative.      During this visit patient's allergy, social, medical, surgical history and medications were reviewed and updated.   Past Medical History:   Diagnosis Date    Abnormal EKG 12/30/2022    at [facility]    Anesthesia complication     per daughter, often has \"delerium\" post anesthesia for weeks    Atrial fibrillation (HCC)     cardioverted to rsr    CAD (coronary artery disease)     Cancer (HCC)     colon w/ resection    CHF (congestive heart failure) ([facility])     CKD (chronic kidney disease)     Clinical trial participant 08/14/2024    [id number]    Colon cancer (HCC)     COVID-19     not hospitalized   had cough  3/17/2023   received antiviral and it was \"mild\" per daughter.    Diabetes mellitus ([facility])     pcp manages    Dribbling urine     Forgetfulness     per daughter, he forgets his meds for days at a time    Gout     H/O cataract     H/O migraine     no longer a problem    H/O prostate cancer     Hematuria     HTN (hypertension)     Hyperlipidemia     Kidney stones     SUKUMAR (obstructive sleep apnea)     has cpap but doesn't use it    Parkinson disease (HCC)     Renal insufficiency     per daughter. Does not see nephrologist    Under care of team     Dr. Lemus at [facility] 1/27/2023; used to see Dr. Cota    Under care of team     Dr. Syed at [facility] for GI    Vasovagal episode 12/29/2022    [facility] ER and overnight hospitalization. Was advised to f/u w/ cardiologist. Meds were changed.    Wears glasses     Wears partial dentures     upper    Wellness examination     [facility] geriatric clinic Dr. Yi last visit 1/26/2023       Past Surgical History:   Procedure Laterality Date    APPENDECTOMY      BLADDER SURGERY

## 2025-04-14 ENCOUNTER — HOSPITAL ENCOUNTER (OUTPATIENT)
Age: 89
Setting detail: SPECIMEN
Discharge: HOME | End: 2025-04-14

## 2025-04-14 DIAGNOSIS — N18.32 STAGE 3B CHRONIC KIDNEY DISEASE (HCC): ICD-10-CM

## 2025-04-14 DIAGNOSIS — D64.89 ANEMIA DUE TO OTHER CAUSE, NOT CLASSIFIED: ICD-10-CM

## 2025-04-14 LAB
25(OH)D3 SERPL-MCNC: 33.8 NG/ML (ref 30–100)
ANION GAP SERPL CALCULATED.3IONS-SCNC: 13 MMOL/L (ref 9–16)
BASOPHILS # BLD: 0.1 K/UL (ref 0–0.2)
BASOPHILS NFR BLD: 1 % (ref 0–2)
BUN SERPL-MCNC: 32 MG/DL (ref 8–23)
CA-I BLD-SCNC: 1.17 MMOL/L (ref 1.13–1.33)
CALCIUM SERPL-MCNC: 9.7 MG/DL (ref 8.6–10.4)
CHLORIDE SERPL-SCNC: 104 MMOL/L (ref 98–107)
CO2 SERPL-SCNC: 25 MMOL/L (ref 20–31)
CREAT SERPL-MCNC: 1.8 MG/DL (ref 0.7–1.2)
CREAT UR-MCNC: 39.8 MG/DL (ref 39–259)
EOSINOPHIL # BLD: 0 K/UL (ref 0–0.4)
EOSINOPHILS RELATIVE PERCENT: 0 % (ref 1–4)
ERYTHROCYTE [DISTWIDTH] IN BLOOD BY AUTOMATED COUNT: 16 % (ref 11.8–14.4)
GFR, ESTIMATED: 36 ML/MIN/1.73M2
GLUCOSE SERPL-MCNC: 121 MG/DL (ref 74–99)
HCT VFR BLD AUTO: 26.3 % (ref 40.7–50.3)
HGB BLD-MCNC: 8.3 G/DL (ref 13–17)
IMM GRANULOCYTES # BLD AUTO: 0.1 K/UL (ref 0–0.3)
IMM GRANULOCYTES NFR BLD: 1 %
LYMPHOCYTES NFR BLD: 8.53 K/UL (ref 1–4.8)
LYMPHOCYTES RELATIVE PERCENT: 82 % (ref 24–44)
MCH RBC QN AUTO: 34.6 PG (ref 25.2–33.5)
MCHC RBC AUTO-ENTMCNC: 31.6 G/DL (ref 28.4–34.8)
MCV RBC AUTO: 109.6 FL (ref 82.6–102.9)
MONOCYTES NFR BLD: 0.73 K/UL (ref 0.1–0.8)
MONOCYTES NFR BLD: 7 % (ref 1–7)
MORPHOLOGY: ABNORMAL
MORPHOLOGY: ABNORMAL
NEUTROPHILS NFR BLD: 9 % (ref 36–66)
NEUTS SEG NFR BLD: 0.94 K/UL (ref 1.8–7.7)
NRBC BLD-RTO: 0 PER 100 WBC
PHOSPHATE SERPL-MCNC: 3.5 MG/DL (ref 2.5–4.5)
PLATELET # BLD AUTO: 256 K/UL (ref 138–453)
PMV BLD AUTO: 10.2 FL (ref 8.1–13.5)
POTASSIUM SERPL-SCNC: 4 MMOL/L (ref 3.7–5.3)
PTH-INTACT SERPL-MCNC: 103 PG/ML (ref 17.9–58.6)
RBC # BLD AUTO: 2.4 M/UL (ref 4.21–5.77)
SODIUM SERPL-SCNC: 142 MMOL/L (ref 136–145)
TOTAL PROTEIN, URINE: 8 MG/DL
WBC OTHER # BLD: 10.4 K/UL (ref 3.5–11.3)

## 2025-04-16 ENCOUNTER — TELEPHONE (OUTPATIENT)
Dept: ONCOLOGY | Age: 89
End: 2025-04-16

## 2025-04-16 ENCOUNTER — OFFICE VISIT (OUTPATIENT)
Dept: ONCOLOGY | Age: 89
End: 2025-04-16
Payer: MEDICARE

## 2025-04-16 VITALS
SYSTOLIC BLOOD PRESSURE: 115 MMHG | HEART RATE: 75 BPM | RESPIRATION RATE: 16 BRPM | DIASTOLIC BLOOD PRESSURE: 63 MMHG | TEMPERATURE: 97.3 F | BODY MASS INDEX: 27.72 KG/M2 | WEIGHT: 182.3 LBS

## 2025-04-16 DIAGNOSIS — D64.89 ANEMIA DUE TO OTHER CAUSE, NOT CLASSIFIED: Primary | ICD-10-CM

## 2025-04-16 PROCEDURE — 1126F AMNT PAIN NOTED NONE PRSNT: CPT | Performed by: INTERNAL MEDICINE

## 2025-04-16 PROCEDURE — 1160F RVW MEDS BY RX/DR IN RCRD: CPT | Performed by: INTERNAL MEDICINE

## 2025-04-16 PROCEDURE — 1123F ACP DISCUSS/DSCN MKR DOCD: CPT | Performed by: INTERNAL MEDICINE

## 2025-04-16 PROCEDURE — G8417 CALC BMI ABV UP PARAM F/U: HCPCS | Performed by: INTERNAL MEDICINE

## 2025-04-16 PROCEDURE — 99214 OFFICE O/P EST MOD 30 MIN: CPT | Performed by: INTERNAL MEDICINE

## 2025-04-16 PROCEDURE — 1159F MED LIST DOCD IN RCRD: CPT | Performed by: INTERNAL MEDICINE

## 2025-04-16 PROCEDURE — 99211 OFF/OP EST MAY X REQ PHY/QHP: CPT | Performed by: INTERNAL MEDICINE

## 2025-04-16 PROCEDURE — G8427 DOCREV CUR MEDS BY ELIG CLIN: HCPCS | Performed by: INTERNAL MEDICINE

## 2025-04-16 PROCEDURE — 1036F TOBACCO NON-USER: CPT | Performed by: INTERNAL MEDICINE

## 2025-04-16 NOTE — TELEPHONE ENCOUNTER
DUANE HERE FOR MD VISIT  IR bone marrow biopsy   RTC one -two weeks after  BM BX IS ON 4/25/25 @ 10AM AT [facility] ARRIVAL @ 8:30AM  MD VISIT 5/9/25 @ 9AM  AVS PRINTED W/ INSTRUCTIONS AND GIVEN TO PT ON EXIT

## 2025-04-16 NOTE — PROGRESS NOTES
Patient ID: Duane Adamczak, 1935, [id number], 89 y.o.  Referred by :No referring provider defined for this encounter.   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 male with a past medical history of atrial fibrillation, colon cancer, CHF, CKD, anemia was seen during initial consultation visit for MGUS.    Patient has a history of colon cancer diagnosed in May 2020 status post resection at [facility] under care of Dr. Ash.  Based on family, he did not need chemotherapy and currently in remission for that and following with his colorectal surgeon.  He did not see medical oncology at that time.    Patient recently was admitted to hospital in November with atrial fibrillation and acute kidney injury.  As per family patient has history of atrial fibrillation in the past and was following with cardiology but his Eliquis was taken off after his colon surgery.  When he was in the hospital in November his Eliquis was resumed as he was in atrial fibrillation.  During the hospitalization he had a CT of the chest with contrast.  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    Patient is accompanied by his daughter during today's office visit.  He lives with his wife in the independent living facility.  He denies any new bone pain back pain.  He does have mild anemia.  His recent lab work with his PCP showed normal iron studies.  He has a mildly low B12 and he is planning to start over-the-counter B12 supplements 1.  Interval history:  Patient is returning for follow-up visit and to discuss lab results and further

## 2025-04-25 ENCOUNTER — HOSPITAL ENCOUNTER (OUTPATIENT)
Dept: CT IMAGING | Age: 89
Discharge: HOME | End: 2025-04-27
Payer: MEDICARE

## 2025-04-25 VITALS
WEIGHT: 182.6 LBS | OXYGEN SATURATION: 95 % | SYSTOLIC BLOOD PRESSURE: 129 MMHG | DIASTOLIC BLOOD PRESSURE: 68 MMHG | RESPIRATION RATE: 16 BRPM | HEIGHT: 68 IN | TEMPERATURE: 97.9 F | BODY MASS INDEX: 27.68 KG/M2 | HEART RATE: 65 BPM

## 2025-04-25 DIAGNOSIS — D64.89 ANEMIA DUE TO OTHER CAUSE, NOT CLASSIFIED: ICD-10-CM

## 2025-04-25 LAB
INR PPP: 1.1
PARTIAL THROMBOPLASTIN TIME: 31 SEC (ref 23–36.5)
PLATELET # BLD AUTO: 240 K/UL (ref 138–453)
PROTHROMBIN TIME: 14.8 SEC (ref 11.7–14.9)

## 2025-04-25 PROCEDURE — 88313 SPECIAL STAINS GROUP 2: CPT

## 2025-04-25 PROCEDURE — 88271 CYTOGENETICS DNA PROBE: CPT

## 2025-04-25 PROCEDURE — 88237 TISSUE CULTURE BONE MARROW: CPT

## 2025-04-25 PROCEDURE — 88311 DECALCIFY TISSUE: CPT

## 2025-04-25 PROCEDURE — 88365 INSITU HYBRIDIZATION (FISH): CPT

## 2025-04-25 PROCEDURE — 88184 FLOWCYTOMETRY/ TC 1 MARKER: CPT

## 2025-04-25 PROCEDURE — 7100000041 HC SPAR PHASE II RECOVERY - ADDTL 15 MIN: Performed by: RADIOLOGY

## 2025-04-25 PROCEDURE — 88341 IMHCHEM/IMCYTCHM EA ADD ANTB: CPT

## 2025-04-25 PROCEDURE — 7100000040 HC SPAR PHASE II RECOVERY - FIRST 15 MIN: Performed by: RADIOLOGY

## 2025-04-25 PROCEDURE — 88342 IMHCHEM/IMCYTCHM 1ST ANTB: CPT

## 2025-04-25 PROCEDURE — 77012 CT SCAN FOR NEEDLE BIOPSY: CPT

## 2025-04-25 PROCEDURE — 88360 TUMOR IMMUNOHISTOCHEM/MANUAL: CPT

## 2025-04-25 PROCEDURE — 88264 CHROMOSOME ANALYSIS 20-25: CPT

## 2025-04-25 PROCEDURE — 88280 CHROMOSOME KARYOTYPE STUDY: CPT

## 2025-04-25 PROCEDURE — 88275 CYTOGENETICS 100-300: CPT

## 2025-04-25 PROCEDURE — 88364 INSITU HYBRIDIZATION (FISH): CPT

## 2025-04-25 PROCEDURE — 85730 THROMBOPLASTIN TIME PARTIAL: CPT

## 2025-04-25 PROCEDURE — 2580000003 HC RX 258: Performed by: PHYSICIAN ASSISTANT

## 2025-04-25 PROCEDURE — 85610 PROTHROMBIN TIME: CPT

## 2025-04-25 PROCEDURE — 88305 TISSUE EXAM BY PATHOLOGIST: CPT

## 2025-04-25 PROCEDURE — 85049 AUTOMATED PLATELET COUNT: CPT

## 2025-04-25 PROCEDURE — 88185 FLOWCYTOMETRY/TC ADD-ON: CPT

## 2025-04-25 RX ORDER — SODIUM CHLORIDE 9 MG/ML
INJECTION, SOLUTION INTRAVENOUS CONTINUOUS
Status: DISCONTINUED | OUTPATIENT
Start: 2025-04-25 | End: 2025-04-28 | Stop reason: HOSPADM

## 2025-04-25 RX ADMIN — SODIUM CHLORIDE: 0.9 INJECTION, SOLUTION INTRAVENOUS at 09:19

## 2025-04-25 ASSESSMENT — PAIN - FUNCTIONAL ASSESSMENT: PAIN_FUNCTIONAL_ASSESSMENT: NONE - DENIES PAIN

## 2025-04-25 ASSESSMENT — PAIN SCALES - GENERAL
PAINLEVEL_OUTOF10: 0
PAINLEVEL_OUTOF10: 0

## 2025-04-25 NOTE — FLOWSHEET NOTE
Pt taken per wheelchair transport per PCT and pt's daughter and helped into his daughter's car and discharged to home without issues.

## 2025-04-25 NOTE — H&P
History and Physical    Pt Name: Duane Adamczak  MRN: [id number]  YOB: 1935  Date of evaluation: 4/25/2025  Primary Care Physician: Toña Yi MD    SUBJECTIVE:   History of Chief Complaint:    Duane Adamczak is a 89 y.o. male who is scheduled today for bone marrow biopsy. Patient presents with daughter who assists in providing medical history. Patient states he has a history of prostate cancer and colon cancer, s/p prostatectomy and colon resection. Patient follows with Dr. Thompson with oncology. He states he has been feeling more fatigued but denies any pain. Daughter states patient has had anemia for months and has been taking iron supplement but denies any blood transfusion. Patient has been diagnosed with MGUS.   Allergies  has no known allergies.  Medications  Prior to Admission medications    Medication Sig Start Date End Date Taking? Authorizing Provider   vitamin B-12 (CYANOCOBALAMIN) 1000 MCG tablet Take 1 tablet by mouth daily   Yes Bryce Blum MD   dapagliflozin (FARXIGA) 5 MG tablet TAKE 1 TABLET BY MOUTH EVERY MORNING 3/27/24  Yes Opal Camejo APRN - CNP   bumetanide (BUMEX) 1 MG tablet Take 1 tablet by mouth Every Monday, Wednesday, and Friday 1/10/24  Yes Opal Camejo APRN - CNP   amLODIPine (NORVASC) 10 MG tablet Take 1 tablet by mouth daily 1/10/24 4/25/25 Yes Opal Camejo APRN - CNP   Iron, Ferrous Sulfate, 325 (65 Fe) MG TABS Take 1 tablet by mouth every other day   Yes ProviderBryce MD   Ascorbic Acid 500 MG CHEW Take 500 mg by mouth 2 times daily 6/28/21  Yes Bryce Blum MD   rivastigmine (EXELON) 6 MG capsule Take 1 capsule by mouth 2 times daily   Yes ProviderBryce MD   sertraline (ZOLOFT) 25 MG tablet Take 1 tablet by mouth daily   Yes Bryce Blum MD   allopurinol (ZYLOPRIM) 300 MG tablet Take 1 tablet by mouth daily   Yes Bryce Blum MD   simvastatin (ZOCOR) 20 MG tablet Take 1 tablet by mouth daily   Yes

## 2025-04-25 NOTE — BRIEF OP NOTE
Brief Postoperative Note for Bone Marrow Biopsy    Duane Adamczak  YOB: 1935  [id number]    Pre-operative Diagnosis: Anemia; MGUS     Post-operative Diagnosis: Same     Procedure: Left posterior iliac bone marrow aspiration with and without heparin & biopsy     Anesthesia: 1% lidocaine      Surgeons/Assistants: MD Kannan; CAROLINA Tang     Estimated Blood Loss: Minimal       Complications: None     Specimens Were Obtained: from the Left posterior iliac spine using an Arrow Oncontrol Device 11 g x 4 in. 6 cc was collected with heparin and 5 cc was collected without heparin. A bone biopsy was also obtained. Specimens were received by hem/onc at the time of collection. Vital signs were reviewed and were stable after the procedure.     Electronically signed by CAROLINA Tang on 4/25/2025 at 10:59 AM

## 2025-04-25 NOTE — DISCHARGE INSTRUCTIONS
Bone Marrow Aspiration and Biopsy: What to Expect at Home  Your Recovery  The biopsy site may feel sore for several days. You may have a bruise on the site. It may help to take pain medicine and put ice packs on the site. You will probably be able to return to work and your usual activities the day after the procedure. Your doctor or nurse will call you with the results of your test.  This care sheet gives you a general idea about how long it will take for you to recover. But each person recovers at a different pace. Follow the steps below to get better as quickly as possible.  How can you care for yourself at home?  Activity    Rest when you feel tired. Getting enough sleep will help you recover.     Most people are able to return to their usual activities the day after the procedure.   Medicines    Your doctor will tell you if and when you can restart your medicines. You will be given instructions about taking any new medicines.     If you stopped taking aspirin or some other blood thinner, your doctor will tell you when to start taking it again.     Be safe with medicines. Read and follow all instructions on the label.  If you are not taking a prescription pain medicine, ask your doctor if you can take an over-the-counter medicine such as acetaminophen (Tylenol). Read and follow all instructions on the label.  If the doctor gave you a prescription medicine for pain, take it as prescribed.  Store your prescription pain medicines where no one else can get to them. When you are done using them, dispose of them quickly and safely. Your local pharmacy or hospital may have a drop-off site.  Do not take two or more pain medicines at the same time unless the doctor told you to. Many pain medicines have acetaminophen, which is Tylenol. Too much acetaminophen (Tylenol) can be harmful.   Ice    Put ice or a cold pack on the biopsy site for 10 to 20 minutes at a time. Put a thin cloth between the ice and your skin.    Follow-up care is a key part of your treatment and safety. Be sure to make and go to all appointments, and call your doctor if you are having problems. It's also a good idea to know your test results and keep a list of the medicines you take.  When should you call for help?   Call 911 anytime you think you may need emergency care. For example, call if:    You passed out (lost consciousness).   Call your doctor now or seek immediate medical care if:    You have signs of infection, such as:  Increased pain, swelling, warmth, or redness.  Red streaks leading from the biopsy site.  Pus draining from the biopsy site.  Swollen lymph nodes in your neck, armpits, or groin.  A fever.   Watch closely for any changes in your health, and be sure to contact your doctor if:    You are not getting better as expected.   Where can you learn more?  Go to [email or web address].net/patientEd and enter E148 to learn more about \"Bone Marrow Aspiration and Biopsy: What to Expect at Home.\"  Current as of: October 25, 2024  Content Version: 14.4  © 6706-4982 [employer].   Care instructions adapted under license by [employer]. If you have questions about a medical condition or this instruction, always ask your healthcare professional. [employer] disclaims any warranty or liability for your use of this information.    Call your doctor for the following:   Chills   Temperature greater than 101   Pain that is not tolerable despite taking pain medicine as ordered   There is increased swelling, redness or warmth at bone marrow biopsy site   There is increased drainage or bleeding from bone marrow biopsy site   Do not remove bone marrow biopsy dressing unless instructed to do so by Dr. Brunner.

## 2025-04-25 NOTE — OR NURSING
Hematology present. Bone marrow and aspiration obtained. Tolerated well. Bandage on. Report called and patient to AR.

## 2025-04-28 LAB
MISCELLANEOUS LAB TEST RESULT: NORMAL
TEST NAME: NORMAL

## 2025-04-29 LAB
SEND OUT REPORT: NORMAL
TEST NAME: NORMAL

## 2025-04-30 LAB — BONE MARROW REPORT: NORMAL

## 2025-05-01 LAB
CHROMOSOME STUDY: NORMAL
FLOW CYTOMETRY, BM: NORMAL

## 2025-05-06 LAB
MISCELLANEOUS LAB TEST RESULT: NORMAL
TEST NAME: NORMAL

## 2025-05-09 ENCOUNTER — TELEPHONE (OUTPATIENT)
Dept: INFUSION THERAPY | Age: 89
End: 2025-05-09

## 2025-05-09 ENCOUNTER — OFFICE VISIT (OUTPATIENT)
Age: 89
End: 2025-05-09
Payer: MEDICARE

## 2025-05-09 ENCOUNTER — TELEPHONE (OUTPATIENT)
Age: 89
End: 2025-05-09

## 2025-05-09 VITALS
WEIGHT: 182.2 LBS | SYSTOLIC BLOOD PRESSURE: 114 MMHG | BODY MASS INDEX: 27.7 KG/M2 | RESPIRATION RATE: 16 BRPM | DIASTOLIC BLOOD PRESSURE: 64 MMHG | TEMPERATURE: 98.5 F | HEART RATE: 74 BPM

## 2025-05-09 DIAGNOSIS — C91.Z0 T-CELL LARGE GRANULAR LYMPHOCYTIC LEUKEMIA (HCC): Primary | ICD-10-CM

## 2025-05-09 PROCEDURE — 1159F MED LIST DOCD IN RCRD: CPT | Performed by: INTERNAL MEDICINE

## 2025-05-09 PROCEDURE — 99215 OFFICE O/P EST HI 40 MIN: CPT | Performed by: INTERNAL MEDICINE

## 2025-05-09 PROCEDURE — 1126F AMNT PAIN NOTED NONE PRSNT: CPT | Performed by: INTERNAL MEDICINE

## 2025-05-09 PROCEDURE — 1123F ACP DISCUSS/DSCN MKR DOCD: CPT | Performed by: INTERNAL MEDICINE

## 2025-05-09 PROCEDURE — G8427 DOCREV CUR MEDS BY ELIG CLIN: HCPCS | Performed by: INTERNAL MEDICINE

## 2025-05-09 PROCEDURE — 99214 OFFICE O/P EST MOD 30 MIN: CPT | Performed by: INTERNAL MEDICINE

## 2025-05-09 PROCEDURE — 1036F TOBACCO NON-USER: CPT | Performed by: INTERNAL MEDICINE

## 2025-05-09 PROCEDURE — G8417 CALC BMI ABV UP PARAM F/U: HCPCS | Performed by: INTERNAL MEDICINE

## 2025-05-09 RX ORDER — FOLIC ACID 1 MG/1
1 TABLET ORAL DAILY
Qty: 90 TABLET | Refills: 1 | Status: SHIPPED | OUTPATIENT
Start: 2025-05-09

## 2025-05-09 NOTE — TELEPHONE ENCOUNTER
DUANE HERE FOR MD VISIT  RTc in 2 weeks w labs  LABS CBC CMP 5/23/25  MD VISIT 5/23/25 @ 9:45AM  AVS PRINTED W/ INSTRUCTIONS AND GIVEN TO PT ON EXIT

## 2025-05-09 NOTE — PROGRESS NOTES
high  Serum IFX Interp  Immunofixation serum profile  Collected: 11/24/23 1227   Result status: Final   Resulting lab: [facility]   Value: A ZONE OF RESTRICTION IS PRESENT IN THE GAMMAGLOBULIN REGION.  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     PATHOLOGY DATA:   Reviewed   IMAGING DATA:      CT BIOPSY BONE MARROW  Narrative: PROCEDURE:  CT GUIDED BONE MARROW ASPIRATION AND CORE NEEDLE BONE BIOPSY OF THE LEFT  ILIAC BONE.    4/25/2025    HISTORY:  ORDERING SYSTEM PROVIDED HISTORY: Anemia due to other cause, not classified  TECHNOLOGIST PROVIDED HISTORY:  send for flow and cytogenetics    SEDATION:  NONE    TECHNIQUE:  Informed consent was obtained following a detailed explanation of the  procedure including risks, benefits, and alternatives.  Universal protocol  was followed. Sterile gowns, masks, hats and gloves utilized for maximal  sterile barrier. Axial images were obtained through the iliac bones using CT  guidance and a suitable skin site was prepped and draped in sterile fashion.  Local anesthesia was achieved with lidocaine.  An 11 gauge [employer] bone  marrow biopsy needle was advanced into the left iliac bone in the region of  the posterosuperior iliac spine.  CT images show safe tract and access into  the bone.  Once safe needle position was achieved a total approximately 13 mL  of bone marrow aspirate was obtained (without and with heparin).  Core bone  marrow biopsy specimen was obtained with the needle and drill system.  All  samples were given directly to the hematology technologist (who confirmed  presence of spicules) for appropriate processing.  Manual compression was  used to achieve hemostasis.  Sterile gauze dressing was applied.  Postprocedure images show no local complication.  EBL: Minimal    Dose modulation, iterative reconstruction, and/or weight based adjustment of  the mA/kV was utilized to reduce the radiation dose to as low as

## 2025-05-09 NOTE — TELEPHONE ENCOUNTER
PS  05/09/25 @ 6503   pharmacy wants to confirm the way you have methotrexate (RHEUMATREX) 10 MG chemo tablet  Take 1 tablet by mouth three times a week for 4 days- please advise     Per  05/09/25 @0197   No It should be 10 mg weekly - he reports he sent in a new script.

## 2025-05-13 LAB
SEND OUT REPORT: NORMAL
TEST NAME: NORMAL

## 2025-05-21 ENCOUNTER — HOSPITAL ENCOUNTER (OUTPATIENT)
Age: 89
Setting detail: SPECIMEN
Discharge: HOME | End: 2025-05-21

## 2025-05-21 DIAGNOSIS — C91.Z0 T-CELL LARGE GRANULAR LYMPHOCYTIC LEUKEMIA (HCC): ICD-10-CM

## 2025-05-21 LAB
ALBUMIN SERPL-MCNC: 4.4 G/DL (ref 3.5–5.2)
ALBUMIN/GLOB SERPL: 2.2 {RATIO} (ref 1–2.5)
ALP SERPL-CCNC: 196 U/L (ref 40–129)
ALT SERPL-CCNC: 43 U/L (ref 10–50)
ANION GAP SERPL CALCULATED.3IONS-SCNC: 12 MMOL/L (ref 9–16)
AST SERPL-CCNC: 39 U/L (ref 10–50)
BASOPHILS # BLD: 0 K/UL (ref 0–0.2)
BASOPHILS NFR BLD: 0 % (ref 0–2)
BILIRUB SERPL-MCNC: 1.7 MG/DL (ref 0–1.2)
BUN SERPL-MCNC: 35 MG/DL (ref 8–23)
CALCIUM SERPL-MCNC: 9.5 MG/DL (ref 8.6–10.4)
CHLORIDE SERPL-SCNC: 102 MMOL/L (ref 98–107)
CO2 SERPL-SCNC: 26 MMOL/L (ref 20–31)
CREAT SERPL-MCNC: 1.6 MG/DL (ref 0.7–1.2)
EOSINOPHIL # BLD: 0.07 K/UL (ref 0–0.4)
EOSINOPHILS RELATIVE PERCENT: 1 % (ref 1–4)
ERYTHROCYTE [DISTWIDTH] IN BLOOD BY AUTOMATED COUNT: 16.5 % (ref 11.8–14.4)
GFR, ESTIMATED: 41 ML/MIN/1.73M2
GLUCOSE SERPL-MCNC: 155 MG/DL (ref 74–99)
HCT VFR BLD AUTO: 23.4 % (ref 40.7–50.3)
HGB BLD-MCNC: 8 G/DL (ref 13–17)
IMM GRANULOCYTES # BLD AUTO: 0 K/UL (ref 0–0.3)
IMM GRANULOCYTES NFR BLD: 0 %
LYMPHOCYTES NFR BLD: 6.12 K/UL (ref 1–4.8)
LYMPHOCYTES RELATIVE PERCENT: 85 % (ref 24–44)
MCH RBC QN AUTO: 37.9 PG (ref 25.2–33.5)
MCHC RBC AUTO-ENTMCNC: 34.2 G/DL (ref 28.4–34.8)
MCV RBC AUTO: 110.9 FL (ref 82.6–102.9)
MONOCYTES NFR BLD: 0.07 K/UL (ref 0.1–0.8)
MONOCYTES NFR BLD: 1 % (ref 1–7)
MORPHOLOGY: ABNORMAL
MORPHOLOGY: ABNORMAL
NEUTROPHILS NFR BLD: 13 % (ref 36–66)
NEUTS SEG NFR BLD: 0.94 K/UL (ref 1.8–7.7)
NRBC BLD-RTO: 0 PER 100 WBC
PLATELET # BLD AUTO: 201 K/UL (ref 138–453)
PMV BLD AUTO: 10.9 FL (ref 8.1–13.5)
POTASSIUM SERPL-SCNC: 4.8 MMOL/L (ref 3.7–5.3)
PROT SERPL-MCNC: 6.4 G/DL (ref 6.6–8.7)
RBC # BLD AUTO: 2.11 M/UL (ref 4.21–5.77)
SODIUM SERPL-SCNC: 140 MMOL/L (ref 136–145)
WBC OTHER # BLD: 7.2 K/UL (ref 3.5–11.3)

## 2025-05-23 ENCOUNTER — OFFICE VISIT (OUTPATIENT)
Age: 89
End: 2025-05-23
Payer: MEDICARE

## 2025-05-23 ENCOUNTER — TELEPHONE (OUTPATIENT)
Age: 89
End: 2025-05-23

## 2025-05-23 VITALS
SYSTOLIC BLOOD PRESSURE: 112 MMHG | HEART RATE: 79 BPM | TEMPERATURE: 98.7 F | WEIGHT: 180.6 LBS | BODY MASS INDEX: 27.46 KG/M2 | DIASTOLIC BLOOD PRESSURE: 71 MMHG

## 2025-05-23 DIAGNOSIS — C91.Z0 T-CELL LARGE GRANULAR LYMPHOCYTIC LEUKEMIA (HCC): Primary | ICD-10-CM

## 2025-05-23 DIAGNOSIS — N18.31 ANEMIA IN STAGE 3A CHRONIC KIDNEY DISEASE (HCC): ICD-10-CM

## 2025-05-23 DIAGNOSIS — D63.1 ANEMIA IN STAGE 3A CHRONIC KIDNEY DISEASE (HCC): ICD-10-CM

## 2025-05-23 PROCEDURE — 99213 OFFICE O/P EST LOW 20 MIN: CPT | Performed by: INTERNAL MEDICINE

## 2025-05-23 PROCEDURE — 1160F RVW MEDS BY RX/DR IN RCRD: CPT | Performed by: INTERNAL MEDICINE

## 2025-05-23 PROCEDURE — G8427 DOCREV CUR MEDS BY ELIG CLIN: HCPCS | Performed by: INTERNAL MEDICINE

## 2025-05-23 PROCEDURE — G8417 CALC BMI ABV UP PARAM F/U: HCPCS | Performed by: INTERNAL MEDICINE

## 2025-05-23 PROCEDURE — 99214 OFFICE O/P EST MOD 30 MIN: CPT | Performed by: INTERNAL MEDICINE

## 2025-05-23 PROCEDURE — 1036F TOBACCO NON-USER: CPT | Performed by: INTERNAL MEDICINE

## 2025-05-23 PROCEDURE — 1126F AMNT PAIN NOTED NONE PRSNT: CPT | Performed by: INTERNAL MEDICINE

## 2025-05-23 PROCEDURE — 1159F MED LIST DOCD IN RCRD: CPT | Performed by: INTERNAL MEDICINE

## 2025-05-23 PROCEDURE — 1123F ACP DISCUSS/DSCN MKR DOCD: CPT | Performed by: INTERNAL MEDICINE

## 2025-05-23 RX ORDER — ACETAMINOPHEN 325 MG/1
650 TABLET ORAL
OUTPATIENT
Start: 2025-05-23

## 2025-05-23 RX ORDER — SODIUM CHLORIDE 9 MG/ML
INJECTION, SOLUTION INTRAVENOUS CONTINUOUS
OUTPATIENT
Start: 2025-05-23

## 2025-05-23 RX ORDER — ONDANSETRON 2 MG/ML
8 INJECTION INTRAMUSCULAR; INTRAVENOUS
OUTPATIENT
Start: 2025-05-23

## 2025-05-23 RX ORDER — DIPHENHYDRAMINE HYDROCHLORIDE 50 MG/ML
50 INJECTION, SOLUTION INTRAMUSCULAR; INTRAVENOUS
OUTPATIENT
Start: 2025-05-23

## 2025-05-23 RX ORDER — HYDROCORTISONE SODIUM SUCCINATE 100 MG/2ML
100 INJECTION INTRAMUSCULAR; INTRAVENOUS
OUTPATIENT
Start: 2025-05-23

## 2025-05-23 RX ORDER — ALBUTEROL SULFATE 90 UG/1
4 INHALANT RESPIRATORY (INHALATION) PRN
OUTPATIENT
Start: 2025-05-23

## 2025-05-23 RX ORDER — FAMOTIDINE 10 MG/ML
20 INJECTION, SOLUTION INTRAVENOUS
OUTPATIENT
Start: 2025-05-23

## 2025-05-23 RX ORDER — EPINEPHRINE 1 MG/ML
0.3 INJECTION, SOLUTION, CONCENTRATE INTRAVENOUS PRN
OUTPATIENT
Start: 2025-05-23

## 2025-05-23 NOTE — PROGRESS NOTES
Patient ID: Duane Adamczak, 1935, [id number], 89 y.o.  Referred by :No referring provider defined for this encounter.   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 male with a past medical history of atrial fibrillation, colon cancer, CHF, CKD, anemia was seen during initial consultation visit for MGUS.    Patient has a history of colon cancer diagnosed in May 2020 status post resection at [facility] under care of Dr. Ash.  Based on family, he did not need chemotherapy and currently in remission for that and following with his colorectal surgeon.  He did not see medical oncology at that time.    Patient recently was admitted to hospital in November with atrial fibrillation and acute kidney injury.  As per family patient has history of atrial fibrillation in the past and was following with cardiology but his Eliquis was taken off after his colon surgery.  When he was in the hospital in November his Eliquis was resumed as he was in atrial fibrillation.  During the hospitalization he had a CT of the chest with contrast.  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    Patient is accompanied by his daughter during today's office visit.  He lives with his wife in the independent living facility.  He denies any new bone pain back pain.  He does have mild anemia.  His recent lab work with his PCP showed normal iron studies.  He has a mildly low

## 2025-05-23 NOTE — TELEPHONE ENCOUNTER
DUANE HERE FOR MD VISIT  Plan for aranesp 1-2 weeks  RTc 6-8 weeks w next aranesp  NEW ORDER IS PENDING PRECERT  MD VISIT 6-8 WKS W/ ARANESP  AVS PRINTED W/ INSTRUCTIONS AND GIVEN TO PT ON EXIT

## 2025-05-28 ENCOUNTER — TELEPHONE (OUTPATIENT)
Dept: INFUSION THERAPY | Facility: MEDICAL CENTER | Age: 89
End: 2025-05-28

## 2025-05-28 NOTE — TELEPHONE ENCOUNTER
I TRIED TO CALL MERNA, DUANE'S DAUGHTER, TO GET HIM SCHEDULED FOR HIS LAB WORK AND INJECTIONS & MD VISIT AND HAD TO LEAVE A VM TO CALL THE OFFICE TO SCHEDULE.

## 2025-06-02 ENCOUNTER — HOSPITAL ENCOUNTER (OUTPATIENT)
Age: 89
Setting detail: SPECIMEN
Discharge: HOME | End: 2025-06-02

## 2025-06-02 ENCOUNTER — APPOINTMENT (OUTPATIENT)
Dept: GENERAL RADIOLOGY | Age: 89
End: 2025-06-02
Payer: MEDICARE

## 2025-06-02 ENCOUNTER — HOSPITAL ENCOUNTER (INPATIENT)
Age: 89
LOS: 3 days | Discharge: HOME HEALTH | End: 2025-06-06
Attending: STUDENT IN AN ORGANIZED HEALTH CARE EDUCATION/TRAINING PROGRAM | Admitting: STUDENT IN AN ORGANIZED HEALTH CARE EDUCATION/TRAINING PROGRAM
Payer: MEDICARE

## 2025-06-02 DIAGNOSIS — I50.9 CONGESTIVE HEART FAILURE, UNSPECIFIED HF CHRONICITY, UNSPECIFIED HEART FAILURE TYPE (HCC): ICD-10-CM

## 2025-06-02 DIAGNOSIS — J18.9 PNEUMONIA DUE TO INFECTIOUS ORGANISM, UNSPECIFIED LATERALITY, UNSPECIFIED PART OF LUNG: ICD-10-CM

## 2025-06-02 DIAGNOSIS — J96.01 ACUTE HYPOXIC RESPIRATORY FAILURE (HCC): Primary | ICD-10-CM

## 2025-06-02 DIAGNOSIS — R06.02 SHORTNESS OF BREATH: ICD-10-CM

## 2025-06-02 LAB
ANION GAP SERPL CALCULATED.3IONS-SCNC: 20 MMOL/L (ref 9–16)
BNP SERPL-MCNC: 5608 PG/ML (ref 0–450)
BUN SERPL-MCNC: 36 MG/DL (ref 8–23)
CALCIUM SERPL-MCNC: 9.1 MG/DL (ref 8.8–10.2)
CHLORIDE SERPL-SCNC: 99 MMOL/L (ref 98–107)
CO2 SERPL-SCNC: 17 MMOL/L (ref 20–31)
CREAT SERPL-MCNC: 2.1 MG/DL (ref 0.7–1.2)
CRITICAL ACTION: NORMAL
CRITICAL NOTIFICATION DATE/TIME: NORMAL
CRITICAL NOTIFICATION: NORMAL
CRITICAL VALUE READ BACK: YES
FLUAV RNA RESP QL NAA+PROBE: NOT DETECTED
FLUBV RNA RESP QL NAA+PROBE: NOT DETECTED
GFR, ESTIMATED: 30 ML/MIN/1.73M2
GLUCOSE SERPL-MCNC: 201 MG/DL (ref 82–115)
HCO3 VENOUS: 21.3 MMOL/L (ref 22–29)
INR PPP: 1.3
LACTATE BLDV-SCNC: 6.8 MMOL/L (ref 0.5–1.9)
NEGATIVE BASE EXCESS, VEN: 3.5 MMOL/L (ref 0–2)
O2 SAT, VEN: 52.7 % (ref 60–85)
PARTIAL THROMBOPLASTIN TIME: 34.2 SEC (ref 23.9–33.8)
PCO2 VENOUS: 35.7 MM HG (ref 41–51)
PH VENOUS: 7.38 (ref 7.32–7.43)
PO2 VENOUS: 28.2 MM HG (ref 30–50)
POTASSIUM SERPL-SCNC: 4.2 MMOL/L (ref 3.7–5.3)
PROTHROMBIN TIME: 16.9 SEC (ref 11.5–14.2)
SARS-COV-2 RNA RESP QL NAA+PROBE: NOT DETECTED
SODIUM SERPL-SCNC: 136 MMOL/L (ref 136–145)
SOURCE: NORMAL
SPECIMEN DESCRIPTION: NORMAL
TROPONIN I SERPL HS-MCNC: 101 NG/L (ref 0–22)

## 2025-06-02 PROCEDURE — 96361 HYDRATE IV INFUSION ADD-ON: CPT

## 2025-06-02 PROCEDURE — 71045 X-RAY EXAM CHEST 1 VIEW: CPT

## 2025-06-02 PROCEDURE — 86850 RBC ANTIBODY SCREEN: CPT

## 2025-06-02 PROCEDURE — 6360000002 HC RX W HCPCS: Performed by: STUDENT IN AN ORGANIZED HEALTH CARE EDUCATION/TRAINING PROGRAM

## 2025-06-02 PROCEDURE — 87636 SARSCOV2 & INF A&B AMP PRB: CPT

## 2025-06-02 PROCEDURE — 0202U NFCT DS 22 TRGT SARS-COV-2: CPT

## 2025-06-02 PROCEDURE — 93005 ELECTROCARDIOGRAM TRACING: CPT | Performed by: STUDENT IN AN ORGANIZED HEALTH CARE EDUCATION/TRAINING PROGRAM

## 2025-06-02 PROCEDURE — 86920 COMPATIBILITY TEST SPIN: CPT

## 2025-06-02 PROCEDURE — 83605 ASSAY OF LACTIC ACID: CPT

## 2025-06-02 PROCEDURE — 83880 ASSAY OF NATRIURETIC PEPTIDE: CPT

## 2025-06-02 PROCEDURE — 85730 THROMBOPLASTIN TIME PARTIAL: CPT

## 2025-06-02 PROCEDURE — 2580000003 HC RX 258: Performed by: STUDENT IN AN ORGANIZED HEALTH CARE EDUCATION/TRAINING PROGRAM

## 2025-06-02 PROCEDURE — 86901 BLOOD TYPING SEROLOGIC RH(D): CPT

## 2025-06-02 PROCEDURE — 87040 BLOOD CULTURE FOR BACTERIA: CPT

## 2025-06-02 PROCEDURE — 96365 THER/PROPH/DIAG IV INF INIT: CPT

## 2025-06-02 PROCEDURE — 80048 BASIC METABOLIC PNL TOTAL CA: CPT

## 2025-06-02 PROCEDURE — 85025 COMPLETE CBC W/AUTO DIFF WBC: CPT

## 2025-06-02 PROCEDURE — 82803 BLOOD GASES ANY COMBINATION: CPT

## 2025-06-02 PROCEDURE — 84484 ASSAY OF TROPONIN QUANT: CPT

## 2025-06-02 PROCEDURE — 86900 BLOOD TYPING SEROLOGIC ABO: CPT

## 2025-06-02 PROCEDURE — 99285 EMERGENCY DEPT VISIT HI MDM: CPT

## 2025-06-02 PROCEDURE — 85610 PROTHROMBIN TIME: CPT

## 2025-06-02 RX ORDER — SODIUM CHLORIDE, SODIUM LACTATE, POTASSIUM CHLORIDE, AND CALCIUM CHLORIDE .6; .31; .03; .02 G/100ML; G/100ML; G/100ML; G/100ML
30 INJECTION, SOLUTION INTRAVENOUS ONCE
Status: COMPLETED | OUTPATIENT
Start: 2025-06-02 | End: 2025-06-02

## 2025-06-02 RX ORDER — SODIUM CHLORIDE 9 MG/ML
INJECTION, SOLUTION INTRAVENOUS PRN
Status: DISCONTINUED | OUTPATIENT
Start: 2025-06-02 | End: 2025-06-06 | Stop reason: HOSPADM

## 2025-06-02 RX ADMIN — CEFEPIME 2000 MG: 2 INJECTION, POWDER, FOR SOLUTION INTRAVENOUS at 22:56

## 2025-06-02 RX ADMIN — SODIUM CHLORIDE, SODIUM LACTATE, POTASSIUM CHLORIDE, AND CALCIUM CHLORIDE 2052 ML: .6; .31; .03; .02 INJECTION, SOLUTION INTRAVENOUS at 22:06

## 2025-06-03 ENCOUNTER — APPOINTMENT (OUTPATIENT)
Age: 89
End: 2025-06-03
Attending: INTERNAL MEDICINE
Payer: MEDICARE

## 2025-06-03 ENCOUNTER — RESULTS FOLLOW-UP (OUTPATIENT)
Dept: SURGERY | Age: 89
End: 2025-06-03

## 2025-06-03 PROBLEM — C91.Z0 LARGE GRANULAR LYMPHOCYTIC LEUKEMIA (HCC): Status: ACTIVE | Noted: 2025-06-03

## 2025-06-03 PROBLEM — J96.01 ACUTE HYPOXIC RESPIRATORY FAILURE (HCC): Status: ACTIVE | Noted: 2025-06-03

## 2025-06-03 PROBLEM — D53.9 MACROCYTIC ANEMIA: Status: ACTIVE | Noted: 2025-05-23

## 2025-06-03 PROBLEM — T45.1X5A ANEMIA ASSOCIATED WITH CHEMOTHERAPY: Status: ACTIVE | Noted: 2025-06-03

## 2025-06-03 PROBLEM — D64.81 ANEMIA ASSOCIATED WITH CHEMOTHERAPY: Status: ACTIVE | Noted: 2025-06-03

## 2025-06-03 LAB
ANION GAP SERPL CALCULATED.3IONS-SCNC: 12 MMOL/L (ref 9–16)
B PARAP IS1001 DNA NPH QL NAA+NON-PROBE: NOT DETECTED
B PERT DNA SPEC QL NAA+PROBE: NOT DETECTED
BASOPHILS # BLD: 0 K/UL (ref 0–0.2)
BASOPHILS # BLD: 0 K/UL (ref 0–0.2)
BASOPHILS NFR BLD: 0 % (ref 0–2)
BASOPHILS NFR BLD: 0 % (ref 0–2)
BUN SERPL-MCNC: 37 MG/DL (ref 8–23)
C PNEUM DNA NPH QL NAA+NON-PROBE: NOT DETECTED
CALCIUM SERPL-MCNC: 8.9 MG/DL (ref 8.8–10.2)
CEA SERPL-MCNC: 1.6 NG/ML (ref 0–3.8)
CHLORIDE SERPL-SCNC: 104 MMOL/L (ref 98–107)
CO2 SERPL-SCNC: 22 MMOL/L (ref 20–31)
CREAT SERPL-MCNC: 1.9 MG/DL (ref 0.7–1.2)
ECHO AO ROOT DIAM: 3.6 CM
ECHO AO ROOT INDEX: 1.84 CM/M2
ECHO AV MEAN GRADIENT: 17 MMHG
ECHO AV MEAN VELOCITY: 2 M/S
ECHO AV PEAK GRADIENT: 27 MMHG
ECHO AV PEAK VELOCITY: 2.6 M/S
ECHO AV VELOCITY RATIO: 0.46
ECHO AV VTI: 52.1 CM
ECHO BSA: 1.99 M2
ECHO EST RA PRESSURE: 8 MMHG
ECHO LA AREA 2C: 27.9 CM2
ECHO LA AREA 4C: 32.6 CM2
ECHO LA DIAMETER INDEX: 2.5 CM/M2
ECHO LA DIAMETER: 4.9 CM
ECHO LA MAJOR AXIS: 7.3 CM
ECHO LA MINOR AXIS: 6.9 CM
ECHO LA TO AORTIC ROOT RATIO: 1.36
ECHO LA VOL BP: 107 ML (ref 18–58)
ECHO LA VOL MOD A2C: 91 ML (ref 18–58)
ECHO LA VOL MOD A4C: 118 ML (ref 18–58)
ECHO LA VOL/BSA BIPLANE: 55 ML/M2 (ref 16–34)
ECHO LA VOLUME INDEX MOD A2C: 46 ML/M2 (ref 16–34)
ECHO LA VOLUME INDEX MOD A4C: 60 ML/M2 (ref 16–34)
ECHO LV E' LATERAL VELOCITY: 9.46 CM/S
ECHO LV E' SEPTAL VELOCITY: 4.13 CM/S
ECHO LV EF PHYSICIAN: 50 %
ECHO LV FRACTIONAL SHORTENING: 37 % (ref 28–44)
ECHO LV INTERNAL DIMENSION DIASTOLE INDEX: 2.65 CM/M2
ECHO LV INTERNAL DIMENSION DIASTOLIC: 5.2 CM (ref 4.2–5.9)
ECHO LV INTERNAL DIMENSION SYSTOLIC INDEX: 1.68 CM/M2
ECHO LV INTERNAL DIMENSION SYSTOLIC: 3.3 CM
ECHO LV IVSD: 1.4 CM (ref 0.6–1)
ECHO LV MASS 2D: 278.4 G (ref 88–224)
ECHO LV MASS INDEX 2D: 142.1 G/M2 (ref 49–115)
ECHO LV POSTERIOR WALL DIASTOLIC: 1.2 CM (ref 0.6–1)
ECHO LV RELATIVE WALL THICKNESS RATIO: 0.46
ECHO LVOT AREA: 3.1 CM2
ECHO LVOT AV VTI INDEX: 0.51
ECHO LVOT DIAM: 2 CM
ECHO LVOT MEAN GRADIENT: 3 MMHG
ECHO LVOT PEAK GRADIENT: 6 MMHG
ECHO LVOT PEAK VELOCITY: 1.2 M/S
ECHO LVOT STROKE VOLUME INDEX: 42.9 ML/M2
ECHO LVOT SV: 84.2 ML
ECHO LVOT VTI: 26.8 CM
ECHO MV A VELOCITY: 1.06 M/S
ECHO MV E DECELERATION TIME (DT): 225 MS
ECHO MV E VELOCITY: 1.44 M/S
ECHO MV E/A RATIO: 1.36
ECHO MV E/E' LATERAL: 15.22
ECHO MV E/E' RATIO (AVERAGED): 25.04
ECHO MV E/E' SEPTAL: 34.87
ECHO RIGHT VENTRICULAR SYSTOLIC PRESSURE (RVSP): 47 MMHG
ECHO TV REGURGITANT MAX VELOCITY: 3.12 M/S
ECHO TV REGURGITANT PEAK GRADIENT: 39 MMHG
EOSINOPHIL # BLD: 0 K/UL (ref 0–0.44)
EOSINOPHIL # BLD: 0 K/UL (ref 0–0.44)
EOSINOPHILS RELATIVE PERCENT: 0 % (ref 1–4)
EOSINOPHILS RELATIVE PERCENT: 0 % (ref 1–4)
ERYTHROCYTE [DISTWIDTH] IN BLOOD BY AUTOMATED COUNT: 16.4 % (ref 11.8–14.4)
ERYTHROCYTE [DISTWIDTH] IN BLOOD BY AUTOMATED COUNT: 16.8 % (ref 11.8–14.4)
FERRITIN SERPL-MCNC: 691 NG/ML
FLUAV RNA NPH QL NAA+NON-PROBE: NOT DETECTED
FLUBV RNA NPH QL NAA+NON-PROBE: NOT DETECTED
FOLATE SERPL-MCNC: 18.5 NG/ML (ref 4.8–24.2)
GFR, ESTIMATED: 34 ML/MIN/1.73M2
GLUCOSE SERPL-MCNC: 125 MG/DL (ref 82–115)
HADV DNA NPH QL NAA+NON-PROBE: NOT DETECTED
HCOV 229E RNA NPH QL NAA+NON-PROBE: NOT DETECTED
HCOV HKU1 RNA NPH QL NAA+NON-PROBE: NOT DETECTED
HCOV NL63 RNA NPH QL NAA+NON-PROBE: NOT DETECTED
HCOV OC43 RNA NPH QL NAA+NON-PROBE: NOT DETECTED
HCT VFR BLD AUTO: 15.5 % (ref 40.7–50.3)
HCT VFR BLD AUTO: 18.6 % (ref 40.7–50.3)
HCT VFR BLD AUTO: 18.7 % (ref 40.7–50.3)
HCT VFR BLD AUTO: 21 % (ref 40.7–50.3)
HGB BLD-MCNC: 5.2 G/DL (ref 13–17)
HGB BLD-MCNC: 6.2 G/DL (ref 13–17)
HGB BLD-MCNC: 6.3 G/DL (ref 13–17)
HGB BLD-MCNC: 7.2 G/DL (ref 13–17)
HMPV RNA NPH QL NAA+NON-PROBE: NOT DETECTED
HPIV1 RNA NPH QL NAA+NON-PROBE: NOT DETECTED
HPIV2 RNA NPH QL NAA+NON-PROBE: NOT DETECTED
HPIV3 RNA NPH QL NAA+NON-PROBE: NOT DETECTED
HPIV4 RNA NPH QL NAA+NON-PROBE: NOT DETECTED
IMM GRANULOCYTES # BLD AUTO: 0 K/UL (ref 0–0.3)
IMM GRANULOCYTES # BLD AUTO: 0 K/UL (ref 0–0.3)
IMM GRANULOCYTES NFR BLD: 0 %
IMM GRANULOCYTES NFR BLD: 0 %
LACTATE BLDV-SCNC: 2 MMOL/L (ref 0.5–1.9)
LYMPHOCYTES NFR BLD: 7.75 K/UL (ref 1.1–3.7)
LYMPHOCYTES NFR BLD: 9.88 K/UL (ref 1.1–3.7)
LYMPHOCYTES RELATIVE PERCENT: 61 % (ref 24–43)
LYMPHOCYTES RELATIVE PERCENT: 76 % (ref 24–43)
M PNEUMO DNA NPH QL NAA+NON-PROBE: NOT DETECTED
MCH RBC QN AUTO: 36.6 PG (ref 25.2–33.5)
MCH RBC QN AUTO: 36.7 PG (ref 25.2–33.5)
MCHC RBC AUTO-ENTMCNC: 33.2 G/DL (ref 28.4–34.8)
MCHC RBC AUTO-ENTMCNC: 33.5 G/DL (ref 28.4–34.8)
MCV RBC AUTO: 109.2 FL (ref 82.6–102.9)
MCV RBC AUTO: 110.7 FL (ref 82.6–102.9)
METHOTREXATE LEVEL: 0.1 UMOL/L
MONOCYTES NFR BLD: 0.52 K/UL (ref 0.1–1.2)
MONOCYTES NFR BLD: 0.76 K/UL (ref 0.1–1.2)
MONOCYTES NFR BLD: 4 % (ref 3–12)
MONOCYTES NFR BLD: 6 % (ref 3–12)
MORPHOLOGY: ABNORMAL
MORPHOLOGY: ABNORMAL
NEUTROPHILS NFR BLD: 20 % (ref 36–65)
NEUTROPHILS NFR BLD: 33 % (ref 36–65)
NEUTS SEG NFR BLD: 2.6 K/UL (ref 1.5–8.1)
NEUTS SEG NFR BLD: 4.19 K/UL (ref 1.5–8.1)
NRBC BLD-RTO: 0 PER 100 WBC
NRBC BLD-RTO: 0.2 PER 100 WBC
PLATELET # BLD AUTO: 154 K/UL (ref 138–453)
PLATELET # BLD AUTO: 201 K/UL (ref 138–453)
PMV BLD AUTO: 10.3 FL (ref 8.1–13.5)
PMV BLD AUTO: 10.7 FL (ref 8.1–13.5)
POTASSIUM SERPL-SCNC: 4.1 MMOL/L (ref 3.7–5.3)
PROCALCITONIN SERPL-MCNC: 0.18 NG/ML (ref 0–0.09)
RBC # BLD AUTO: 1.42 M/UL (ref 4.21–5.77)
RBC # BLD AUTO: 1.69 M/UL (ref 4.21–5.77)
RSV RNA NPH QL NAA+NON-PROBE: NOT DETECTED
RV+EV RNA NPH QL NAA+NON-PROBE: NOT DETECTED
SARS-COV-2 RNA NPH QL NAA+NON-PROBE: NOT DETECTED
SODIUM SERPL-SCNC: 138 MMOL/L (ref 136–145)
SPECIMEN DESCRIPTION: NORMAL
TROPONIN I SERPL HS-MCNC: 1013 NG/L (ref 0–22)
TROPONIN I SERPL HS-MCNC: 2033 NG/L (ref 0–22)
TROPONIN I SERPL HS-MCNC: 252 NG/L (ref 0–22)
TROPONIN I SERPL HS-MCNC: 2887 NG/L (ref 0–22)
TROPONIN I SERPL HS-MCNC: NORMAL NG/L (ref 0–22)
VIT B12 SERPL-MCNC: 586 PG/ML (ref 232–1245)
WBC OTHER # BLD: 12.7 K/UL (ref 3.5–11.3)
WBC OTHER # BLD: 13 K/UL (ref 3.5–11.3)

## 2025-06-03 PROCEDURE — 84484 ASSAY OF TROPONIN QUANT: CPT

## 2025-06-03 PROCEDURE — 36415 COLL VENOUS BLD VENIPUNCTURE: CPT

## 2025-06-03 PROCEDURE — 80204 DRUG ASSAY METHOTREXATE: CPT

## 2025-06-03 PROCEDURE — 82746 ASSAY OF FOLIC ACID SERUM: CPT

## 2025-06-03 PROCEDURE — 6370000000 HC RX 637 (ALT 250 FOR IP): Performed by: INTERNAL MEDICINE

## 2025-06-03 PROCEDURE — 2500000003 HC RX 250 WO HCPCS: Performed by: STUDENT IN AN ORGANIZED HEALTH CARE EDUCATION/TRAINING PROGRAM

## 2025-06-03 PROCEDURE — 36430 TRANSFUSION BLD/BLD COMPNT: CPT

## 2025-06-03 PROCEDURE — 93306 TTE W/DOPPLER COMPLETE: CPT | Performed by: STUDENT IN AN ORGANIZED HEALTH CARE EDUCATION/TRAINING PROGRAM

## 2025-06-03 PROCEDURE — 93306 TTE W/DOPPLER COMPLETE: CPT

## 2025-06-03 PROCEDURE — 80048 BASIC METABOLIC PNL TOTAL CA: CPT

## 2025-06-03 PROCEDURE — 1200000000 HC SEMI PRIVATE

## 2025-06-03 PROCEDURE — 2580000003 HC RX 258: Performed by: STUDENT IN AN ORGANIZED HEALTH CARE EDUCATION/TRAINING PROGRAM

## 2025-06-03 PROCEDURE — 30233N1 TRANSFUSION OF NONAUTOLOGOUS RED BLOOD CELLS INTO PERIPHERAL VEIN, PERCUTANEOUS APPROACH: ICD-10-PCS | Performed by: INTERNAL MEDICINE

## 2025-06-03 PROCEDURE — 2700000000 HC OXYGEN THERAPY PER DAY

## 2025-06-03 PROCEDURE — 85018 HEMOGLOBIN: CPT

## 2025-06-03 PROCEDURE — 82728 ASSAY OF FERRITIN: CPT

## 2025-06-03 PROCEDURE — 82607 VITAMIN B-12: CPT

## 2025-06-03 PROCEDURE — 94761 N-INVAS EAR/PLS OXIMETRY MLT: CPT

## 2025-06-03 PROCEDURE — 84145 PROCALCITONIN (PCT): CPT

## 2025-06-03 PROCEDURE — 6360000002 HC RX W HCPCS: Performed by: STUDENT IN AN ORGANIZED HEALTH CARE EDUCATION/TRAINING PROGRAM

## 2025-06-03 PROCEDURE — 93005 ELECTROCARDIOGRAM TRACING: CPT | Performed by: STUDENT IN AN ORGANIZED HEALTH CARE EDUCATION/TRAINING PROGRAM

## 2025-06-03 PROCEDURE — 99222 1ST HOSP IP/OBS MODERATE 55: CPT | Performed by: STUDENT IN AN ORGANIZED HEALTH CARE EDUCATION/TRAINING PROGRAM

## 2025-06-03 PROCEDURE — 85014 HEMATOCRIT: CPT

## 2025-06-03 PROCEDURE — 85025 COMPLETE CBC W/AUTO DIFF WBC: CPT

## 2025-06-03 PROCEDURE — 99223 1ST HOSP IP/OBS HIGH 75: CPT | Performed by: INTERNAL MEDICINE

## 2025-06-03 PROCEDURE — P9016 RBC LEUKOCYTES REDUCED: HCPCS

## 2025-06-03 RX ORDER — LEUCOVORIN CALCIUM 25 MG/1
25 TABLET ORAL EVERY 6 HOURS
Status: COMPLETED | OUTPATIENT
Start: 2025-06-03 | End: 2025-06-04

## 2025-06-03 RX ORDER — ONDANSETRON 4 MG/1
4 TABLET, ORALLY DISINTEGRATING ORAL EVERY 8 HOURS PRN
Status: DISCONTINUED | OUTPATIENT
Start: 2025-06-03 | End: 2025-06-06 | Stop reason: HOSPADM

## 2025-06-03 RX ORDER — SODIUM CHLORIDE 9 MG/ML
INJECTION, SOLUTION INTRAVENOUS PRN
Status: DISCONTINUED | OUTPATIENT
Start: 2025-06-03 | End: 2025-06-04

## 2025-06-03 RX ORDER — SODIUM CHLORIDE 0.9 % (FLUSH) 0.9 %
5-40 SYRINGE (ML) INJECTION EVERY 12 HOURS SCHEDULED
Status: DISCONTINUED | OUTPATIENT
Start: 2025-06-03 | End: 2025-06-06 | Stop reason: HOSPADM

## 2025-06-03 RX ORDER — ONDANSETRON 2 MG/ML
4 INJECTION INTRAMUSCULAR; INTRAVENOUS EVERY 6 HOURS PRN
Status: DISCONTINUED | OUTPATIENT
Start: 2025-06-03 | End: 2025-06-06 | Stop reason: HOSPADM

## 2025-06-03 RX ORDER — SODIUM CHLORIDE 0.9 % (FLUSH) 0.9 %
5-40 SYRINGE (ML) INJECTION PRN
Status: DISCONTINUED | OUTPATIENT
Start: 2025-06-03 | End: 2025-06-06 | Stop reason: HOSPADM

## 2025-06-03 RX ORDER — SODIUM CHLORIDE 9 MG/ML
INJECTION, SOLUTION INTRAVENOUS PRN
Status: DISCONTINUED | OUTPATIENT
Start: 2025-06-03 | End: 2025-06-06 | Stop reason: HOSPADM

## 2025-06-03 RX ORDER — POLYETHYLENE GLYCOL 3350 17 G/17G
17 POWDER, FOR SOLUTION ORAL DAILY PRN
Status: DISCONTINUED | OUTPATIENT
Start: 2025-06-03 | End: 2025-06-06 | Stop reason: HOSPADM

## 2025-06-03 RX ORDER — ACETAMINOPHEN 650 MG/1
650 SUPPOSITORY RECTAL EVERY 6 HOURS PRN
Status: DISCONTINUED | OUTPATIENT
Start: 2025-06-03 | End: 2025-06-06 | Stop reason: HOSPADM

## 2025-06-03 RX ORDER — ACETAMINOPHEN 325 MG/1
650 TABLET ORAL EVERY 6 HOURS PRN
Status: DISCONTINUED | OUTPATIENT
Start: 2025-06-03 | End: 2025-06-06 | Stop reason: HOSPADM

## 2025-06-03 RX ORDER — FUROSEMIDE 10 MG/ML
40 INJECTION INTRAMUSCULAR; INTRAVENOUS ONCE
Status: COMPLETED | OUTPATIENT
Start: 2025-06-03 | End: 2025-06-03

## 2025-06-03 RX ADMIN — CEFEPIME 1000 MG: 1 INJECTION, POWDER, FOR SOLUTION INTRAMUSCULAR; INTRAVENOUS at 23:39

## 2025-06-03 RX ADMIN — CEFEPIME 1000 MG: 1 INJECTION, POWDER, FOR SOLUTION INTRAMUSCULAR; INTRAVENOUS at 10:31

## 2025-06-03 RX ADMIN — LEUCOVORIN CALCIUM 25 MG: 25 TABLET ORAL at 21:59

## 2025-06-03 RX ADMIN — AZITHROMYCIN MONOHYDRATE 500 MG: 500 INJECTION, POWDER, LYOPHILIZED, FOR SOLUTION INTRAVENOUS at 02:58

## 2025-06-03 RX ADMIN — AZITHROMYCIN MONOHYDRATE 500 MG: 500 INJECTION, POWDER, LYOPHILIZED, FOR SOLUTION INTRAVENOUS at 23:40

## 2025-06-03 RX ADMIN — LEUCOVORIN CALCIUM 25 MG: 25 TABLET ORAL at 16:39

## 2025-06-03 RX ADMIN — FUROSEMIDE 40 MG: 10 INJECTION, SOLUTION INTRAMUSCULAR; INTRAVENOUS at 15:38

## 2025-06-03 RX ADMIN — SODIUM CHLORIDE, PRESERVATIVE FREE 10 ML: 5 INJECTION INTRAVENOUS at 21:59

## 2025-06-03 NOTE — PLAN OF CARE
Pt alert and oriented x4 this shift. Pt received a total of 2 units of blood since admission, hemoglobin recheck at 17:45. Pt also increasingly short of breath and had to be increased to 7L O2. Cardiology evaluated pt, trops increased to 2033, pt remains asymptomatic. Dr. Liang and Dr. Conteh ok with pt remaining on PCU unless pt has a change in status. ECHO completed today, cardiology aware of results. One time dose of IV lasix completed today. External cath in place, pt one assist with walker to bathroom. Second IV placed in left forearm. Respiratory panel negative. Bed alarm on for safety, call light within reach.     18:30- hemoglobin recheck 7.2.  Problem: Chronic Conditions and Co-morbidities  Goal: Patient's chronic conditions and co-morbidity symptoms are monitored and maintained or improved  Outcome: Progressing     Problem: Discharge Planning  Goal: Discharge to home or other facility with appropriate resources  Outcome: Progressing     Problem: Safety - Adult  Goal: Free from fall injury  Outcome: Progressing     Problem: Skin/Tissue Integrity  Goal: Skin integrity remains intact  Description: 1.  Monitor for areas of redness and/or skin breakdown2.  Assess vascular access sites hourly3.  Every 4-6 hours minimum:  Change oxygen saturation probe site4.  Every 4-6 hours:  If on nasal continuous positive airway pressure, respiratory therapy assess nares and determine need for appliance change or resting period  Outcome: Progressing     Problem: ABCDS Injury Assessment  Goal: Absence of physical injury  Outcome: Progressing

## 2025-06-03 NOTE — ED NOTES
ED to inpatient nurses report     Admitting Diagnosis: Pneumonia   Chief Complaint   Patient presents with    Shortness of Breath     Onset this afternoon      Present to ED from Assisted living   LOC: alert and orientated to name, place, date  Patient confused: No  Vital signs   Vitals:    06/02/25 2245 06/02/25 2300 06/02/25 2315 06/02/25 2330   BP: 109/61 (!) 109/59 106/60 (!) 107/58   Pulse: 86 81 81 78   Resp: 25 28 27 27   Temp:       TempSrc:       SpO2: 92% 93% 92% 92%   Weight:       Height:          Oxygen Baseline RA    Current needs required 2L   SEPSIS: Y  [Y] Lactate X 2 ordered (Yes or No)  [Y] Antibiotics given (Yes or No)  [Y] IV Fluids ordered (Yes or No)             [N] 2nd IV completed (Yes or No)  [Y] Hourly Vital Signs (Validated)  [] Outstanding Orders:     LDAs:   Peripheral IV 06/02/25 Left Antecubital (Active)     Mobility:  Typically uses walker  Fall Risk:    Outstanding ED orders: None    Consults: IP CONSULT TO HOSPITALIST  []  Hospitalist  Completed  [] yes [] no Who:   []  Medicine  Completed  [] yes [] No Who:   []  Cardiology  Completed  [] yes [] No Who:   []  GI   Completed  [] yes [] No Who:   []  Neurology  Completed  [] yes [] No Who:   []  Nephrology Completed  [] yes [] No Who:    []  Vascular  Completed  [] yes [] No Who:   []  Ortho  Completed  [] yes [] No Who:     []  Surgery  Completed  [] yes [] No Who:    []  Urology  Completed  [] yes [] No Who:    []  CT Surgery Completed  [] yes [] No Who:   []  Podiatry  Completed  [] yes [] No Who:    []  Other    Completed  [] yes [] No Who:    Situation and Background: See chart    Interventions and Important Events: IV, EKG, labs, cultures, chest Xray, ATB              *Effective communication is essential throughout this process. It is important for the nurse to document the progress of the med rec to keep team members informed. If any changes are made to the home medication list, the nurse is responsible for notifying the

## 2025-06-03 NOTE — PROGRESS NOTES
Hgb 5.2.  Blood consent confirmed. Patient's daughter Preeti updated of hgb and 1 unit PRBC order.  Dual sign off done with writer and second RN, Kira.  VS obtained 30 minutes prior to blood transfusion initiated and 15 minutes after.  Patient observed for first 15 minutes of transfusion.  No adverse reactions observed. Will continue to monitor.

## 2025-06-03 NOTE — CONSULTS
Today's Date: 6/3/2025  Patient Name: Duane Adamczak  Date of admission: 6/2/2025  9:24 PM  Patient's age: 89 y.o., 1935  Admission Dx: Pneumonia due to infectious organism, unspecified laterality, unspecified part of lung [J18.9]  Acute hypoxic respiratory failure (HCC) [J96.01]    Reason for Consult: management recommendations  Requesting Physician: Clara Can MD    CHIEF COMPLAINT: Worsening shortness of breath    History Obtained From:  patient    HISTORY OF PRESENT ILLNESS:      The patient is a 89 y.o.   male who is admitted to the hospital for worsening shortness of breath and chills.  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  Baseline hemoglobin is around between 8 and 10.  Platelets are normal and white count is normal.  But the differential leukocytic count showed predominant lymphocytosis and the patient ANC is running around 0.8-1.0.  The patient was found to be severely anemic with hemoglobin down to 6.2.  He has leukocytosis with increased left shift.  Cardiac workup showed ST depression and elevation of the troponin.  Patient is seen and evaluated.  Getting blood transfusion.    Past Medical History:   has a past medical history of Abnormal EKG, Anesthesia complication, Atrial fibrillation (HCC), CAD (coronary artery disease), Cancer (HCC), CHF (congestive heart failure) (HCC), CKD (chronic kidney disease), Clinical trial participant, Colon cancer (HCC), COVID-19, Diabetes mellitus (HCC), Dribbling urine, Forgetfulness, Gout, H/O cataract, H/O migraine, H/O prostate cancer, Hematuria, HTN (hypertension), Hyperlipidemia, Kidney stones, MGUS (monoclonal gammopathy of unknown significance), SUKUMAR (obstructive sleep apnea), Parkinson disease (HCC), Renal insufficiency, Supplemental oxygen dependent, Under care of team, Under care of team, Vasovagal episode, Wears glasses, Wears partial dentures, and Wellness examination.    Past Surgical History:   has a past  breath.  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  We will offer leucovorin and hold methotrexate  I am very concerned about the rise in troponin and the EKG changes.  Appreciate cardiology input.  The worsening dyspnea and increased troponin could be related to worsening anemia  Agree with septic workup and antibiotics  Continue supportive care, agree with transfusion, try to keep hemoglobin above 7.  Will follow closely with you      Discussed with patient and Nurse.      Thank you for asking us to see this patient.    Satya Lazar MD  Hematologist/Medical Oncologist  Cell: [phone]

## 2025-06-03 NOTE — PROGRESS NOTES
Occupational Therapy  DATE: 6/3/2025    NAME: Duane Adamczak  MRN: [id number]   : 1935    Patient not seen this date for Occupational Therapy due to:      [x] Cancel by RN or physician due to: Pt not appropriate for therapy evaluation this date, OT will continue to follow.     [] Hemodialysis    [] Critical Lab Value Level     [] Blood transfusion in progress    [] Acute or unstable cardiovascular status   _MAP < 55 or more than >115  _HR < 40 or > 130    [] Acute or unstable pulmonary status   -FiO2 > 60%   _RR < 5 or >40    _O2 sats < 85%    [] Strict Bedrest    [] Off Unit for surgery or procedure    [] Off Unit for testing       [] Pending imaging to R/O fracture    [] Refusal by Patient      [x] Other: HgB 5.2 this morning & per RN, pt just finished receiving a blood transfusion, awaiting HgB recheck at this time. Pt's troponins also increased from 252 to 1,013 overnight - awaiting cardiology consult.        [] OT being discontinued at this time. Patient independent. No further needs.     [] OT being discontinued at this time as the patient has been transferred to hospice care. No further needs.      Jyoti Gordon, OT

## 2025-06-03 NOTE — PROGRESS NOTES
Physical Therapy  DATE: 6/3/2025    NAME: Duane Adamczak  MRN: [id number]   : 1935    Patient not seen this date for Physical Therapy due to:      [x] Cancel by RN secondary to not appropriate this date. PT continue to follow.    [] Hemodialysis    [x] Critical Lab Value Level (low Hgb;  elevated troponin)    [] Blood transfusion in progress    [] Acute or unstable cardiovascular status   _MAP < 55 or more than >115  _HR < 40 or > 130    [] Acute or unstable pulmonary status   -FiO2 > 60%   _RR < 5 or >40    _O2 sats < 85%    [] Strict Bedrest    [] Off Unit for surgery or procedure    [] Off Unit for testing       [] Pending imaging to R/O fracture    [] Refusal by Patient      [] Other      [] PT being discontinued at this time. Patient independent. No further needs.     [] PT being discontinued at this time as the patient has been transferred to hospice care. No further needs.      Liliana Rocha, PT

## 2025-06-03 NOTE — CARE COORDINATION
Case Management Assessment  Initial Evaluation    Date/Time of Evaluation: 6/3/2025 1:24 PM  Assessment Completed by: LAILA TAVARES RN    If patient is discharged prior to next notation, then this note serves as note for discharge by case management.    Patient Name: Duane Adamczak                   YOB: 1935  Diagnosis: Pneumonia due to infectious organism, unspecified laterality, unspecified part of lung [J18.9]  Acute hypoxic respiratory failure (HCC) [J96.01]                   Date / Time: 6/2/2025  9:24 PM    Patient Admission Status: Inpatient   Readmission Risk (Low < 19, Mod (19-27), High > 27): Readmission Risk Score: 18.1    Current PCP: Toña Yi MD  PCP verified by CM? Yes    Chart Reviewed: Yes      History Provided by: Patient, Child/Family  Patient Orientation: Alert and Oriented    Patient Cognition: Alert    Hospitalization in the last 30 days (Readmission):  No    If yes, Readmission Assessment in  Navigator will be completed.    Advance Directives:      Code Status: Full Code   Patient's Primary Decision Maker is: Legal Next of Kin      Discharge Planning:    Patient lives with: Spouse/Significant Other Type of Home: Assisted living (from [place])  Primary Care Giver: Self  Patient Support Systems include: Spouse/Significant Other, Children, Family Members   Current Financial resources: Medicare  Current community resources: ECF/Home Care  Current services prior to admission: Durable Medical Equipment, Private Duty Homecare, C-pap, Oxygen Therapy (paid help that comes in to pass meds.)            Current DME: Walker, Shower Chair, Cpap, Oxygen Therapy (Comment) (has 02 unsure of company)            Type of Home Care services:  Nursing Services, Aide Services, OT, PT, RT    ADLS  Prior functional level: Assistance with the following:, Other (see comment) (have help from AL they pass meds bid)  Current functional level: Assistance with the following:, Other (see  currently. Message to [facility] to see if they have patient. Answer pending.    Call to [facility] he is not current with them.     Daughter states he only wears at night usually or when sob in apt. They have discussing moving parents to the [place], [facility] and to have an appt with [facility] about more help in the home.     Patient follows with Dr Thompson for MGUS and als anemia. He received aranesp as outpatient.     Daughter states that his strength has decreased. [facility] but did not have a good experience. Will follow up with patient and daughter tomorrow per their request.     May benefit from ARU     The Plan for Transition of Care is related to the following treatment goals of Pneumonia due to infectious organism, unspecified laterality, unspecified part of lung [J18.9]  Acute hypoxic respiratory failure (HCC) [J96.01]    IF APPLICABLE: The Patient and/or patient representative Duane and his family were provided with a choice of provider and agrees with the discharge plan. Freedom of choice list with basic dialogue that supports the patient's individualized plan of care/goals and shares the quality data associated with the providers was provided to: Patient   Patient Representative Name:       The Patient and/or Patient Representative Agree with the Discharge Plan? Yes    LAILA TAVARES RN  Case Management Department

## 2025-06-03 NOTE — ED PROVIDER NOTES
Notable for the following components:    APTT 34.2 (*)     All other components within normal limits   LACTATE, SEPSIS - Abnormal; Notable for the following components:    Lactic Acid, Sepsis 6.8 (*)     All other components within normal limits   LACTATE, SEPSIS - Abnormal; Notable for the following components:    Lactic Acid, Sepsis 2.0 (*)     All other components within normal limits   TROPONIN - Abnormal; Notable for the following components:    Troponin, High Sensitivity 252 (*)     All other components within normal limits   VENOUS BLOOD GAS, POINT OF CARE - Abnormal; Notable for the following components:    pCO2, Deuce 35.7 (*)     PO2, Deuce 28.2 (*)     HCO3, Venous 21.3 (*)     Negative Base Excess, Deuce 3.5 (*)     O2 Sat, Deuce 52.7 (*)     All other components within normal limits   CULTURE, BLOOD 1   CULTURE, BLOOD 1   COVID-19 & INFLUENZA COMBO   RESPIRATORY PANEL, MOLECULAR, WITH COVID-19   PROCALCITONIN   NOTIFICATION PANEL, POC   TYPE AND SCREEN   PREPARE RBC (CROSSMATCH)   PREPARE RBC (CROSSMATCH)       Vitals Reviewed:    Vitals:    06/02/25 2315 06/02/25 2330 06/03/25 0000 06/03/25 0130   BP: 106/60 (!) 107/58 105/64 111/61   Pulse: 81 78 80 82   Resp: 27 27 25 20   Temp:    97.9 °F (36.6 °C)   TempSrc:    Oral   SpO2: 92% 92% 96% 95%   Weight:       Height:         MEDICATIONS GIVEN TO PATIENT THIS ENCOUNTER:  Orders Placed This Encounter   Medications    lactated ringers bolus 2,052 mL    ceFEPIme (MAXIPIME) 2,000 mg in sodium chloride 0.9 % 100 mL IVPB (addEASE)     Antimicrobial Indications:   Pneumonia (CAP)    0.9 % sodium chloride infusion    0.9 % sodium chloride infusion    cefepime (MAXIPIME) 1,000 mg in sodium chloride 0.9 % 50 mL IVPB (addEASE)     Antimicrobial Indications:   Pneumonia (CAP)     CAP duration of therapy:   5 days    azithromycin (ZITHROMAX) 500 mg in sodium chloride 0.9 % 250 mL (addEASE) IVPB     Antimicrobial Indications:   Pneumonia (CAP)     CAP duration of therapy:              OUTPATIENT FOLLOW UP THE PATIENT:  No follow-up provider specified.    DISCHARGE MEDICATIONS:  Current Discharge Medication List          Louie Cortes DO  EmergencyMedicine Attending    (Please note that portions of this note were completed with a voice recognition program.  Efforts were made to edit the dictations but occasionally words are mis-transcribed.)     Louie Cortes DO  06/03/25 0445

## 2025-06-03 NOTE — PROGRESS NOTES
Patient transferred from ER. VS obtained and telemetry applied. Orders released and admission database initiated. Head to toe assessment complete. A/O x4. No complaints of pain. On 2L NC. Pt states he has home O2 PRN. Pt lives at [facility] assisted living with wife. Pt oriented to room. Call light placed within reach.    [x] The Home Med List was verified for accuracy and marked COMPLETED. The following sources were used to assist with Medication Reconciliation:    [] Med Rec Pharmacy tech has already completed home med list in the ED and note reviewed   [] Patient med list was transcribed into the EHR and verified for accuracy.(Note method of verification)  [] Patient provided bottles of their medications for validation  [x] Medications reviewed and confirmed with Preeti (Please list name and relationship to patient)  [] Contacted patient's pharmacy to confirm home medications (Please list the pharmacy)  [] Home medications reviewed using Dispense Report   [] Contacted physician office to confirm home medications  [] Medical Records received from another facility (list facility and place copy placed in chart)  []   was notified regarding changes to home med list via  on 6/3/2025 at the following time:      [] There are one or more home medications that need clarification before Medication Reconciliation can be marked completed. The Med List Status has been marked as In Progress.   To assist with Home Medication Reconciliation the following actions have been taken:    [] Family requested to bring medications in their original bottles to the hospital for verification  [] Family requested to call hospital with list of medications  [] Call to physicians off and left message (list physician and who they spoke to, date and time)  [] Request for medical records made to   [] MAR from facility requested to be faxed over  [] Unable to complete due to patient condition  [] Oncoming nurse  notified of incomplete med list for

## 2025-06-03 NOTE — H&P
CKD, hypertension, leukemia, and anemia presents to the emergency department for shortness of breath for 1 day.  He was found to require 2 L of nasal cannula in the emergency department.  Chest x-ray shows pulmonary edema.  Patient denies sick contacts or recent travel.  She lives in an assisted living facility.  Patient denies nausea, vomiting, diarrhea, headache, numbness, chest pain or tingling.    Past Medical History:     Past Medical History:   Diagnosis Date    Abnormal EKG 12/30/2022    at [facility]    Anesthesia complication     per daughter, often has \"delerium\" post anesthesia for weeks    Atrial fibrillation (HCC)     cardioverted to rsr    CAD (coronary artery disease)     Cancer (HCC)     colon w/ resection    CHF (congestive heart failure) (HCC)     CKD (chronic kidney disease)     Clinical trial participant 08/14/2024    [id number]    Colon cancer (HCC)     COVID-19     not hospitalized   had cough  3/17/2023   received antiviral and it was \"mild\" per daughter.    Diabetes mellitus (HCC)     pcp manages    Dribbling urine     Forgetfulness     per daughter, he forgets his meds for days at a time    Gout     H/O cataract     H/O migraine     no longer a problem    H/O prostate cancer     Hematuria     HTN (hypertension)     Hyperlipidemia     Kidney stones     MGUS (monoclonal gammopathy of unknown significance)     SUKUMAR (obstructive sleep apnea)     has cpap but doesn't use it    Parkinson disease (HCC)     Renal insufficiency     per daughter. Does not see nephrologist    Supplemental oxygen dependent     at night and as needed, 2 LPM NC    Under care of team     Dr. Lemus at [facility] 1/27/2023; used to see Dr. Cota    Under care of team     Dr. Syed at [facility] for GI    Vasovagal episode 12/29/2022    [facility] ER and overnight hospitalization. Was advised to f/u w/ cardiologist. Meds were changed.    Wears glasses     Wears partial dentures     upper    Wellness examination     [facility] geriatric     MCH 36.7 (H) 25.2 - 33.5 pg    MCHC 33.2 28.4 - 34.8 g/dL    RDW 16.4 (H) 11.8 - 14.4 %    Platelets 201 138 - 453 k/uL    MPV 10.3 8.1 - 13.5 fL    NRBC Automated 0.2 (H) 0.0 per 100 WBC    Neutrophils % 33 (L) 36 - 65 %    Lymphocytes % 61 (H) 24 - 43 %    Monocytes % 6 3 - 12 %    Eosinophils % 0 (L) 1 - 4 %    Basophils % 0 0 - 2 %    Immature Granulocytes % 0 0 %    Neutrophils Absolute 4.19 1.50 - 8.10 k/uL    Lymphocytes Absolute 7.75 (H) 1.10 - 3.70 k/uL    Monocytes Absolute 0.76 0.10 - 1.20 k/uL    Eosinophils Absolute 0.00 0.00 - 0.44 k/uL    Basophils Absolute 0.00 0.00 - 0.20 k/uL    Immature Granulocytes Absolute 0.00 0.00 - 0.30 k/uL    Morphology MACROCYTOSIS PRESENT    Brain Natriuretic Peptide    Collection Time: 06/02/25  9:55 PM   Result Value Ref Range    NT Pro-BNP 5,608 (H) 0 - 450 pg/mL   Basic Metabolic Panel    Collection Time: 06/02/25  9:55 PM   Result Value Ref Range    Sodium 136 136 - 145 mmol/L    Potassium 4.2 3.7 - 5.3 mmol/L    Chloride 99 98 - 107 mmol/L    CO2 17 (L) 20 - 31 mmol/L    Anion Gap 20 (H) 9 - 16 mmol/L    Glucose 201 (H) 82 - 115 mg/dL    BUN 36 (H) 8 - 23 mg/dL    Creatinine 2.1 (H) 0.70 - 1.20 mg/dL    Est, Glom Filt Rate 30 (L) >60 mL/min/1.73m2    Calcium 9.1 8.8 - 10.2 mg/dL   Troponin    Collection Time: 06/02/25  9:55 PM   Result Value Ref Range    Troponin, High Sensitivity 101 (HH) 0 - 22 ng/L   Protime-INR    Collection Time: 06/02/25  9:55 PM   Result Value Ref Range    Protime 16.9 (H) 11.5 - 14.2 sec    INR 1.3    APTT    Collection Time: 06/02/25  9:55 PM   Result Value Ref Range    APTT 34.2 (H) 23.9 - 33.8 sec   Lactate, Sepsis    Collection Time: 06/02/25  9:55 PM   Result Value Ref Range    Lactic Acid, Sepsis 6.8 (H) 0.5 - 1.9 mmol/L   COVID-19 & Influenza Combo    Collection Time: 06/02/25  9:55 PM    Specimen: Nasopharyngeal Swab   Result Value Ref Range    Specimen Description .NASOPHARYNGEAL SWAB     Source .NASOPHARYNGEAL SWAB

## 2025-06-03 NOTE — CONSENT
Informed Consent for Blood Component Transfusion Note    I have discussed with the patient the rationale for blood component transfusion; its benefits in treating or preventing fatigue, organ damage, or death; and its risk which includes mild transfusion reactions, rare risk of blood borne infection, or more serious but rare reactions. I have discussed the alternatives to transfusion, including the risk and consequences of not receiving transfusion. The patient had an opportunity to ask questions and had agreed to proceed with transfusion of blood components.    Electronically signed by Clara Can MD on 6/3/25 at 12:19 AM EDT

## 2025-06-03 NOTE — CONSULTS
at 06/03/25 0358    sodium chloride flush 0.9 % injection 5-40 mL  5-40 mL IntraVENous 2 times per day Clara Can MD        sodium chloride flush 0.9 % injection 5-40 mL  5-40 mL IntraVENous PRN Clara Can MD        0.9 % sodium chloride infusion   IntraVENous PRN Clara Can MD        ondansetron (ZOFRAN-ODT) disintegrating tablet 4 mg  4 mg Oral Q8H PRN Clara Can MD        Or    ondansetron (ZOFRAN) injection 4 mg  4 mg IntraVENous Q6H PRN Clara Can MD        polyethylene glycol (GLYCOLAX) packet 17 g  17 g Oral Daily PRN Clara Can MD        acetaminophen (TYLENOL) tablet 650 mg  650 mg Oral Q6H PRN Clara Can MD        Or    acetaminophen (TYLENOL) suppository 650 mg  650 mg Rectal Q6H PRN Clara Can MD        0.9 % sodium chloride infusion   IntraVENous PRN Poppy Couch APRN - NP        0.9 % sodium chloride infusion   IntraVENous PRN Joaquim Conteh DO        0.9 % sodium chloride infusion   IntraVENous PRN Louie Cortes DO         Allergies:  Patient has no known allergies.    Social History:    Social History     Socioeconomic History    Marital status:      Spouse name: Not on file    Number of children: Not on file    Years of education: Not on file    Highest education level: Not on file   Occupational History    Not on file   Tobacco Use    Smoking status: Never    Smokeless tobacco: Never   Vaping Use    Vaping status: Never Used   Substance and Sexual Activity    Alcohol use: Not Currently    Drug use: Never    Sexual activity: Not Currently   Other Topics Concern    Not on file   Social History Narrative    Not on file     Social Drivers of Health     Financial Resource Strain: Low Risk  (9/19/2024)    Received from [facility]    Overall Financial Resource Strain (CARDIA)     Difficulty of Paying Living Expenses: Not hard at all   Food Insecurity: No Food Insecurity (6/3/2025)    Hunger Vital Sign     Worried About  GFRAA >60 05/15/2022 09:08 AM    LABGLOM 34 06/03/2025 05:15 AM    LABGLOM 48 04/26/2024 10:15 AM    GLUCOSE 125 06/03/2025 05:15 AM    CALCIUM 8.9 06/03/2025 05:15 AM     Magnesium:    Lab Results   Component Value Date/Time    MG 1.8 11/26/2023 05:03 AM     PTT:    Lab Results   Component Value Date/Time    APTT 34.2 06/02/2025 09:55 PM     TSH:    Lab Results   Component Value Date/Time    TSH 1.84 11/22/2023 05:23 AM     BNP:   Recent Labs     06/02/25  2155   PROBNP 5,608*     BMP:  Lab Results   Component Value Date/Time     06/03/2025 05:15 AM    K 4.1 06/03/2025 05:15 AM     06/03/2025 05:15 AM    CO2 22 06/03/2025 05:15 AM    BUN 37 06/03/2025 05:15 AM    CREATININE 1.9 06/03/2025 05:15 AM    CALCIUM 8.9 06/03/2025 05:15 AM    GFRAA >60 05/15/2022 09:08 AM    LABGLOM 34 06/03/2025 05:15 AM    LABGLOM 48 04/26/2024 10:15 AM    GLUCOSE 125 06/03/2025 05:15 AM     LIVER PROFILE:No results for input(s): \"AST\", \"ALT\", \"LABALBU\", \"ALKPHOS\", \"BILITOT\", \"BILIDIR\", \"IBILI\", \"GLOB\", \"ALBUMIN\" in the last 72 hours.    Invalid input(s): \"PROT\"  FLP:    Lab Results   Component Value Date/Time    CHOL 75 11/22/2023 05:23 AM    TRIG 94 11/22/2023 05:23 AM    HDL 26 11/22/2023 05:23 AM         IMPRESSION  Acute on Chronic Diastolic CHF  Coronary Artery Disease  Non-ST Elevation Myocardial Infarction  - trops 100-1100. In setting of anemia and CHF  Acute Anemia  - Hb down to 5.6  Paroxysmal Atrial Fibrillation  - eliquis 2.5  Colon Cancer s/p resection  Acute on Chronic Kidney Disease  Hypertension  T2DM        RECOMMENDATIONS:     Pt presents with SOB and concern for mild CHF with DAVID/CKD and anemia   Transfuse Hb >7  Give IV lasix 40mg one time and monitor breathing, UO and response  Check troponins Q6 until peak. He has no other signs of ACS or chest pain and elevation is likely related to his severe anemia, SOB, CHF, CKD and is type 2 in nature.   Will obtain 2d ECHO to assess LV function for changes  No

## 2025-06-03 NOTE — ED NOTES
Px notified of low HBG and educated on blood transfusion as treatment. Px and daughter state at this time they would like to wait until they can talk to cancer physician tomorrow before making a decision. Will continue to monitor

## 2025-06-03 NOTE — PROGRESS NOTES
Pt's hemoglobin 6.3, 1 unit PRBC ordered. Consent verified, type and screen completed. Blood touched vein at 13:08. Writer observed pt for first 15 minutes of transfusion. No transfusion reaction noted. Vital signs taken within 30 minutes prior to infusion and 15 minutes after initiation of transfusion. Care ongoing.

## 2025-06-04 LAB
ANION GAP SERPL CALCULATED.3IONS-SCNC: 13 MMOL/L (ref 9–16)
BASOPHILS # BLD: 0 K/UL (ref 0–0.2)
BASOPHILS NFR BLD: 0 %
BUN SERPL-MCNC: 35 MG/DL (ref 8–23)
CALCIUM SERPL-MCNC: 8.8 MG/DL (ref 8.8–10.2)
CHLORIDE SERPL-SCNC: 101 MMOL/L (ref 98–107)
CO2 SERPL-SCNC: 24 MMOL/L (ref 20–31)
CREAT SERPL-MCNC: 1.8 MG/DL (ref 0.7–1.2)
EKG ATRIAL RATE: 81 BPM
EKG ATRIAL RATE: 90 BPM
EKG P AXIS: 25 DEGREES
EKG P AXIS: 34 DEGREES
EKG P-R INTERVAL: 250 MS
EKG P-R INTERVAL: 270 MS
EKG Q-T INTERVAL: 404 MS
EKG Q-T INTERVAL: 428 MS
EKG QRS DURATION: 114 MS
EKG QRS DURATION: 118 MS
EKG QTC CALCULATION (BAZETT): 494 MS
EKG QTC CALCULATION (BAZETT): 497 MS
EKG R AXIS: -41 DEGREES
EKG R AXIS: -46 DEGREES
EKG T AXIS: 48 DEGREES
EKG T AXIS: 52 DEGREES
EKG VENTRICULAR RATE: 81 BPM
EKG VENTRICULAR RATE: 90 BPM
EOSINOPHIL # BLD: 0.1 K/UL (ref 0–0.4)
EOSINOPHILS RELATIVE PERCENT: 1 % (ref 1–4)
ERYTHROCYTE [DISTWIDTH] IN BLOOD BY AUTOMATED COUNT: 20.5 % (ref 11.8–14.4)
GFR, ESTIMATED: 36 ML/MIN/1.73M2
GLUCOSE SERPL-MCNC: 110 MG/DL (ref 82–115)
HAPTOGLOB SERPL-MCNC: 137 MG/DL (ref 30–200)
HCT VFR BLD AUTO: 19.6 % (ref 40.7–50.3)
HCT VFR BLD AUTO: 20.7 % (ref 40.7–50.3)
HGB BLD-MCNC: 6.8 G/DL (ref 13–17)
HGB BLD-MCNC: 7.2 G/DL (ref 13–17)
IMM GRANULOCYTES # BLD AUTO: 0 K/UL (ref 0–0.3)
IMM GRANULOCYTES NFR BLD: 0 %
IMM RETICS NFR: 13.1 % (ref 2.7–18.3)
LDH SERPL-CCNC: 360 U/L (ref 135–225)
LYMPHOCYTES NFR BLD: 6.82 K/UL (ref 1–4.8)
LYMPHOCYTES RELATIVE PERCENT: 71 % (ref 24–44)
MAGNESIUM SERPL-MCNC: 1.6 MG/DL (ref 1.6–2.4)
MCH RBC QN AUTO: 34.9 PG (ref 25.2–33.5)
MCHC RBC AUTO-ENTMCNC: 34.7 G/DL (ref 28.4–34.8)
MCV RBC AUTO: 100.5 FL (ref 82.6–102.9)
MONOCYTES NFR BLD: 0.38 K/UL (ref 0.2–0.8)
MONOCYTES NFR BLD: 4 % (ref 1–7)
MORPHOLOGY: ABNORMAL
NEUTROPHILS NFR BLD: 24 % (ref 36–66)
NEUTS SEG NFR BLD: 2.3 K/UL (ref 1.8–7.7)
NRBC BLD-RTO: 0 PER 100 WBC
PLATELET # BLD AUTO: 180 K/UL (ref 138–453)
PMV BLD AUTO: 10.6 FL (ref 8.1–13.5)
POTASSIUM SERPL-SCNC: 3.5 MMOL/L (ref 3.7–5.3)
RBC # BLD AUTO: 1.95 M/UL (ref 4.21–5.77)
RETIC HEMOGLOBIN: 36 PG (ref 28.2–35.7)
RETICS # AUTO: 0.01 M/UL (ref 0.03–0.08)
RETICS/RBC NFR AUTO: 0.8 % (ref 0.5–1.9)
SODIUM SERPL-SCNC: 137 MMOL/L (ref 136–145)
TROPONIN I SERPL HS-MCNC: 2702 NG/L (ref 0–22)
WBC OTHER # BLD: 9.6 K/UL (ref 3.5–11.3)

## 2025-06-04 PROCEDURE — 6360000002 HC RX W HCPCS: Performed by: STUDENT IN AN ORGANIZED HEALTH CARE EDUCATION/TRAINING PROGRAM

## 2025-06-04 PROCEDURE — 6370000000 HC RX 637 (ALT 250 FOR IP): Performed by: INTERNAL MEDICINE

## 2025-06-04 PROCEDURE — 97535 SELF CARE MNGMENT TRAINING: CPT

## 2025-06-04 PROCEDURE — 2580000003 HC RX 258: Performed by: STUDENT IN AN ORGANIZED HEALTH CARE EDUCATION/TRAINING PROGRAM

## 2025-06-04 PROCEDURE — 97162 PT EVAL MOD COMPLEX 30 MIN: CPT

## 2025-06-04 PROCEDURE — 97110 THERAPEUTIC EXERCISES: CPT

## 2025-06-04 PROCEDURE — 93010 ELECTROCARDIOGRAM REPORT: CPT | Performed by: INTERNAL MEDICINE

## 2025-06-04 PROCEDURE — 83010 ASSAY OF HAPTOGLOBIN QUANT: CPT

## 2025-06-04 PROCEDURE — 83735 ASSAY OF MAGNESIUM: CPT

## 2025-06-04 PROCEDURE — 6360000002 HC RX W HCPCS: Performed by: INTERNAL MEDICINE

## 2025-06-04 PROCEDURE — 85045 AUTOMATED RETICULOCYTE COUNT: CPT

## 2025-06-04 PROCEDURE — 97166 OT EVAL MOD COMPLEX 45 MIN: CPT

## 2025-06-04 PROCEDURE — 85025 COMPLETE CBC W/AUTO DIFF WBC: CPT

## 2025-06-04 PROCEDURE — 2500000003 HC RX 250 WO HCPCS: Performed by: STUDENT IN AN ORGANIZED HEALTH CARE EDUCATION/TRAINING PROGRAM

## 2025-06-04 PROCEDURE — 85014 HEMATOCRIT: CPT

## 2025-06-04 PROCEDURE — 97530 THERAPEUTIC ACTIVITIES: CPT

## 2025-06-04 PROCEDURE — 85018 HEMOGLOBIN: CPT

## 2025-06-04 PROCEDURE — 36415 COLL VENOUS BLD VENIPUNCTURE: CPT

## 2025-06-04 PROCEDURE — 84484 ASSAY OF TROPONIN QUANT: CPT

## 2025-06-04 PROCEDURE — 80048 BASIC METABOLIC PNL TOTAL CA: CPT

## 2025-06-04 PROCEDURE — 36430 TRANSFUSION BLD/BLD COMPNT: CPT

## 2025-06-04 PROCEDURE — P9016 RBC LEUKOCYTES REDUCED: HCPCS

## 2025-06-04 PROCEDURE — 1200000000 HC SEMI PRIVATE

## 2025-06-04 PROCEDURE — 99232 SBSQ HOSP IP/OBS MODERATE 35: CPT | Performed by: INTERNAL MEDICINE

## 2025-06-04 PROCEDURE — 83615 LACTATE (LD) (LDH) ENZYME: CPT

## 2025-06-04 RX ORDER — RIVASTIGMINE TARTRATE 1.5 MG/1
6 CAPSULE ORAL 2 TIMES DAILY
Status: DISCONTINUED | OUTPATIENT
Start: 2025-06-04 | End: 2025-06-06 | Stop reason: HOSPADM

## 2025-06-04 RX ORDER — SODIUM CHLORIDE 9 MG/ML
INJECTION, SOLUTION INTRAVENOUS PRN
Status: DISCONTINUED | OUTPATIENT
Start: 2025-06-04 | End: 2025-06-04

## 2025-06-04 RX ORDER — SERTRALINE HYDROCHLORIDE 25 MG/1
25 TABLET, FILM COATED ORAL DAILY
Status: DISCONTINUED | OUTPATIENT
Start: 2025-06-04 | End: 2025-06-06 | Stop reason: HOSPADM

## 2025-06-04 RX ORDER — ASCORBIC ACID 500 MG
1000 TABLET ORAL DAILY
Status: DISCONTINUED | OUTPATIENT
Start: 2025-06-04 | End: 2025-06-06 | Stop reason: HOSPADM

## 2025-06-04 RX ORDER — FUROSEMIDE 10 MG/ML
40 INJECTION INTRAMUSCULAR; INTRAVENOUS DAILY
Status: DISCONTINUED | OUTPATIENT
Start: 2025-06-04 | End: 2025-06-05

## 2025-06-04 RX ORDER — POTASSIUM CHLORIDE 1500 MG/1
40 TABLET, EXTENDED RELEASE ORAL ONCE
Status: COMPLETED | OUTPATIENT
Start: 2025-06-04 | End: 2025-06-04

## 2025-06-04 RX ORDER — LANOLIN ALCOHOL/MO/W.PET/CERES
1000 CREAM (GRAM) TOPICAL DAILY
Status: DISCONTINUED | OUTPATIENT
Start: 2025-06-04 | End: 2025-06-06 | Stop reason: HOSPADM

## 2025-06-04 RX ORDER — FOLIC ACID 1 MG/1
1 TABLET ORAL DAILY
Status: DISCONTINUED | OUTPATIENT
Start: 2025-06-04 | End: 2025-06-06 | Stop reason: HOSPADM

## 2025-06-04 RX ORDER — VITAMIN B COMPLEX
2000 TABLET ORAL DAILY
Status: DISCONTINUED | OUTPATIENT
Start: 2025-06-04 | End: 2025-06-06 | Stop reason: HOSPADM

## 2025-06-04 RX ADMIN — EPOETIN ALFA-EPBX 10000 UNITS: 10000 INJECTION, SOLUTION INTRAVENOUS; SUBCUTANEOUS at 16:36

## 2025-06-04 RX ADMIN — FOLIC ACID 1 MG: 1 TABLET ORAL at 16:36

## 2025-06-04 RX ADMIN — SODIUM CHLORIDE, PRESERVATIVE FREE 10 ML: 5 INJECTION INTRAVENOUS at 22:48

## 2025-06-04 RX ADMIN — CYANOCOBALAMIN TAB 1000 MCG 1000 MCG: 1000 TAB at 16:36

## 2025-06-04 RX ADMIN — RIVASTIGMINE TARTRATE 6 MG: 1.5 CAPSULE ORAL at 22:45

## 2025-06-04 RX ADMIN — SERTRALINE 25 MG: 25 TABLET, FILM COATED ORAL at 16:36

## 2025-06-04 RX ADMIN — POTASSIUM CHLORIDE 40 MEQ: 1500 TABLET, EXTENDED RELEASE ORAL at 11:24

## 2025-06-04 RX ADMIN — OXYCODONE HYDROCHLORIDE AND ACETAMINOPHEN 1000 MG: 500 TABLET ORAL at 16:36

## 2025-06-04 RX ADMIN — EMPAGLIFLOZIN 10 MG: 10 TABLET, FILM COATED ORAL at 16:36

## 2025-06-04 RX ADMIN — Medication 2000 UNITS: at 16:36

## 2025-06-04 RX ADMIN — CEFEPIME 2000 MG: 2 INJECTION, POWDER, FOR SOLUTION INTRAVENOUS at 22:45

## 2025-06-04 RX ADMIN — CEFEPIME 1000 MG: 1 INJECTION, POWDER, FOR SOLUTION INTRAMUSCULAR; INTRAVENOUS at 10:13

## 2025-06-04 RX ADMIN — LEUCOVORIN CALCIUM 25 MG: 25 TABLET ORAL at 04:26

## 2025-06-04 RX ADMIN — AZITHROMYCIN MONOHYDRATE 500 MG: 500 INJECTION, POWDER, LYOPHILIZED, FOR SOLUTION INTRAVENOUS at 23:32

## 2025-06-04 RX ADMIN — FUROSEMIDE 40 MG: 10 INJECTION, SOLUTION INTRAMUSCULAR; INTRAVENOUS at 14:15

## 2025-06-04 RX ADMIN — LEUCOVORIN CALCIUM 25 MG: 25 TABLET ORAL at 10:10

## 2025-06-04 RX ADMIN — SODIUM CHLORIDE, PRESERVATIVE FREE 10 ML: 5 INJECTION INTRAVENOUS at 07:30

## 2025-06-04 NOTE — PROGRESS NOTES
normal left ventricular systolic function with a visually estimated EF of 50 - 55%. EF by visual approximation is 50%. Left ventricle size is normal. Mildly increased wall thickness. Moderate basal septal thickening. Normal wall motion. Grade III diastolic dysfunction with increased LAP.    Right Ventricle: Right ventricle size is normal. Normal systolic function.    Aortic Valve: Mildly calcified cusps. Mild stenosis of the aortic valve. AV mean gradient is 17 mmHg. AV Peak Gradient is 27 mmHg. AV Mean Gradient is 17 mmHg.    Mitral Valve: Moderate to severe regurgitation.    Tricuspid Valve: Moderate regurgitation. Moderately elevated RVSP, consistent with moderate pulmonary hypertension.    Left Atrium: Left atrium is severely dilated. Left atrial volume index is severely increased (>48 mL/m2) mL/m2.    Image quality is adequate.    Labs:   CBC:  Recent Labs     06/03/25  0515 06/03/25  1050 06/04/25  0528 06/04/25  1051   WBC 13.0*  --  9.6  --    HGB 5.2*   < > 6.8* 7.2*   HCT 15.5*   < > 19.6* 20.7*     --  180  --     < > = values in this interval not displayed.     Magnesium:  Recent Labs     06/04/25  0528   MG 1.6     BMP:  Recent Labs     06/03/25  0515 06/04/25  0528    137   K 4.1 3.5*   CALCIUM 8.9 8.8   CO2 22 24   BUN 37* 35*   CREATININE 1.9* 1.8*   LABGLOM 34* 36*   GLUCOSE 125* 110     BNP:  Recent Labs     06/02/25 2155   PROBNP 5,608*     PT/INR:  Recent Labs     06/02/25 2155   PROTIME 16.9*   INR 1.3     APTT:  Recent Labs     06/02/25  2155   APTT 34.2*     CARDIAC ENZYMES:  Recent Labs     06/03/25  1744 06/03/25  2107 06/04/25  0528   TROPHS DISREGARD RESULTS. INCORRECT DATE AND/OR TIME. 2,887* 2,702*     FASTING LIPID PANEL:  Lab Results   Component Value Date/Time    HDL 26 11/22/2023 05:23 AM    TRIG 94 11/22/2023 05:23 AM     LIVER PROFILE:No results for input(s): \"AST\", \"ALT\", \"LABALBU\", \"ALKPHOS\", \"BILITOT\", \"BILIDIR\", \"IBILI\", \"GLOB\", \"ALBUMIN\" in the last 72  hours.    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  Pt still has fine crackles but no edema but has 6L O2 requirement. Will start lasix IV 40mg daily and monitor response and creatinine  Cont on tele   Bp and HR stable  ECHO reassuring. No new EF changes or wall motion. Has moderate-severe MR worsened by fluid status and will diurese  No invasive plans, especially due to anemia and possibility of bleeding  Monitor Hb and workup per primary  Keep Hb >7      Discussed with patient and family and nursing.    Ant Liang MD

## 2025-06-04 NOTE — PROGRESS NOTES
Physical Therapy  Facility/Department: [facility] CARE  Physical Therapy Initial Assessment    Name: Duane Adamczak  : 1935  MRN: [id number]  Date of Service: 2025    Discharge Recommendations:    Pt currently functioning below baseline.  Recommend daily therapy at time of discharge to maximize long term outcomes and prevent re-admission. Please refer to AM-PAC score for current level of function.     Chief Complaint   Patient presents with    Shortness of Breath       Onset this afternoon            Patient Diagnosis(es): The primary encounter diagnosis was Acute hypoxic respiratory failure (HCC). Diagnoses of Pneumonia due to infectious organism, unspecified laterality, unspecified part of lung, Congestive heart failure, unspecified HF chronicity, unspecified heart failure type (HCC), and Shortness of breath were also pertinent to this visit.  Past Medical History:  has a past medical history of Abnormal EKG, Anesthesia complication, Atrial fibrillation (HCC), CAD (coronary artery disease), Cancer (HCC), CHF (congestive heart failure) (HCC), CKD (chronic kidney disease), Clinical trial participant, Colon cancer (HCC), COVID-19, Diabetes mellitus (HCC), Dribbling urine, Forgetfulness, Gout, H/O cataract, H/O migraine, H/O prostate cancer, Hematuria, HTN (hypertension), Hyperlipidemia, Kidney stones, MGUS (monoclonal gammopathy of unknown significance), SUKUMAR (obstructive sleep apnea), Parkinson disease (HCC), Renal insufficiency, Supplemental oxygen dependent, Under care of team, Under care of team, Vasovagal episode, Wears glasses, Wears partial dentures, and Wellness examination.  Past Surgical History:  has a past surgical history that includes transesophageal echocardiogram (N/A, 2019); Lithotripsy; Prostatectomy; Bladder surgery; Cholecystectomy; Total knee arthroplasty; Appendectomy; Rotator cuff repair (Left); Cardioversion (N/A, 2019); Colonoscopy; Laparoscopic left colon resection;  Goals   Patient Goals : improve all mobility       Education  Patient Education  Education Given To: Patient  Education Provided: Role of Therapy;Plan of Care;Home Exercise Program  Education Provided Comments: impt. of OOB, walker safety, see EX section  Education Method: Demonstration;Verbal  Barriers to Learning: Cognition  Education Outcome: Continued education needed      Therapy Time   Individual Concurrent Group Co-treatment   Time In 1542         Time Out 1645         Minutes 63             Treatment time:  53 min.   Co-treatment with OT warranted secondary to decreased safety and independence requiring 2 skilled therapy professionals to address individual discipline's goals.      DEAN TAO, PT

## 2025-06-04 NOTE — PLAN OF CARE
Got 3rd unit of blood today. HGB recheck 7.2. no signs of bleeding   On 4L NC. IV ABX  Up to chair today  Trops trending down, no more rechecks   Vitals stable     Problem: Safety - Adult  Goal: Free from fall injury  6/4/2025 1522 by Crystal Goetz RN  Outcome: Progressing     Problem: Skin/Tissue Integrity  Goal: Skin integrity remains intact  Description: 1.  Monitor for areas of redness and/or skin breakdown2.  Assess vascular access sites hourly3.  Every 4-6 hours minimum:  Change oxygen saturation probe site4.  Every 4-6 hours:  If on nasal continuous positive airway pressure, respiratory therapy assess nares and determine need for appliance change or resting period  6/4/2025 1522 by Crystal Goetz RN  Outcome: Progressing     Problem: Cardiovascular - Adult  Goal: Maintains optimal cardiac output and hemodynamic stability  6/4/2025 1522 by Crystal Goetz RN  Outcome: Progressing     Problem: Cardiovascular - Adult  Goal: Absence of cardiac dysrhythmias or at baseline  6/4/2025 1522 by Crystal Goetz RN  Outcome: Progressing     Problem: Respiratory - Adult  Goal: Achieves optimal ventilation and oxygenation  Outcome: Progressing

## 2025-06-04 NOTE — PROGRESS NOTES
Today's Date: 6/4/2025  Patient Name: Duane Adamczak  Date of admission: 6/2/2025  9:24 PM  Patient's age: 89 y.o., 1935  Admission Dx: Pneumonia due to infectious organism, unspecified laterality, unspecified part of lung [J18.9]  Acute hypoxic respiratory failure (HCC) [J96.01]    Reason for Consult: management recommendations  Requesting Physician: Clara Can MD    CHIEF COMPLAINT: Worsening shortness of breath    Interim history  Patient is seen and evaluated  Hemoglobin bumped up to about 7 but now down to 6.8.  We are planning to give blood transfusion this morning  The patient states that his breathing is improved significantly but he continues to be on oxygen.  Troponin continued to rise yesterday but I think they are plateauing now.  He denies any chest pain.      HISTORY OF PRESENT ILLNESS:      The patient is a 89 y.o.   male who is admitted to the hospital for worsening shortness of breath and chills.  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  Baseline hemoglobin is around between 8 and 10.  Platelets are normal and white count is normal.  But the differential leukocytic count showed predominant lymphocytosis and the patient ANC is running around 0.8-1.0.  The patient was found to be severely anemic with hemoglobin down to 6.2.  He has leukocytosis with increased left shift.  Cardiac workup showed ST depression and elevation of the troponin.  Patient is seen and evaluated.  Getting blood transfusion.    Past Medical History:   has a past medical history of Abnormal EKG, Anesthesia complication, Atrial fibrillation (HCC), CAD (coronary artery disease), Cancer (HCC), CHF (congestive heart failure) (HCC), CKD (chronic kidney disease), Clinical trial participant, Colon cancer (HCC), COVID-19, Diabetes mellitus (HCC), Dribbling urine, Forgetfulness, Gout, H/O cataract, H/O migraine, H/O prostate cancer, Hematuria, HTN (hypertension), Hyperlipidemia, Kidney stones,  06/02/25 2155   PROTIME 16.9*   INR 1.3       IMAGING DATA:      Primary Problem  Acute hypoxic respiratory failure (HCC)    Active Hospital Problems    Diagnosis Date Noted    Acute hypoxic respiratory failure (HCC) [J96.01] 06/03/2025    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  But that is expected considering his kidney function.  Looking at the bone marrow aspiration biopsy.  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  Agree with transfusion.  Will plan to exclude hemolysis and start him on Procrit  Appreciate cardiology input  Continue supportive care.  Will follow closely with you and try to get the hemoglobin up to 8 considering his active cardiac symptoms.        Discussed with patient and Nurse.      Thank you for asking us to see this patient.    Satya Lazar MD  Hematologist/Medical Oncologist  Cell: [phone]

## 2025-06-04 NOTE — PROGRESS NOTES
Occupational Therapy  [facility]  Occupational Therapy Not Seen Note    Patient not available for Occupational Therapy due to:    [] Testing:    [] Hemodialysis    [x] Cancelled by RN:6/4 hold fred per RN GERTRUDE as pt getting blood/hbg 6.8 and note trop level 2,702; continue to follow     []Refusal by Patient:    [] Surgery:     [] Intubation:     [] Pain Medication:    [] Sedation:     [] Spine Precautions :    [] Medical Instability:    [] Other:

## 2025-06-04 NOTE — PROGRESS NOTES
[facility]  Office: [phone]  Nick Rivas, DO, Jorge Lynn, DO, Joaquim Conteh DO, James Monae, DO, Les Johnson MD, Odalis Pressley MD, Enrico Santos MD, Shasha Mckenna MD,  Yury Moss MD, May Perez MD, Burke Polk MD,  Satya Rahman DO, Ty Shelton MD, Chidi Oliveros MD, Kulwinder Rivas DO, Elizabeth Barajas MD,  Juan Lewis DO, Deann Plummer MD, Camila Chandra MD, Buck Hewitt MD,  Luc Hanks MD, Raleigh Hudson MD, Leslye Zuniga MD, Kenny Metz MD, Azam Fink MD, Chreyle Menard MD, Edwin Gaviria, DO, Lyndsey Spicer MD, Jennifer Lobato DO, Michael Washington MD, Satya Alejandro MD, Mohsin Reza, MD, Racquel Moy MD, Shirley Waterhouse, CNP,  Alyssa Yusuf, CNP, Edwin Crabtree, CNP,  Carleen Pratt, BASIA, Janina Zuluaga, CNP, Poppy Couch, CNP, Liz Honeycutt, CNP, Daisy Rashid, CNP, Violetta Dahl, PA-C, Florence Choi, CNP, Silvino Chaves, CNP,  Maria R Espinosa, CNP, Linda Ryan, CNP, Yassine Valverde, PA-C, Tawny Kaufman, CNP,  Kenzie Infante, CNS, Trini Gibbons, CNP, Ledy Stone, CNP,   Radha Naylor, CNP         [facility]   IN-PATIENT SERVICE   [facility]    Progress Note    6/4/2025    4:00 PM    Name:   Duane Adamczak  MRN:     [id number]     Acct:      [id number]   Room:   1007/1007-02  IP Day:  1  Admit Date:  6/2/2025  9:24 PM    PCP:   Toña Yi MD  Code Status:  Full Code    Subjective:     C/C:   Chief Complaint   Patient presents with    Shortness of Breath     Onset this afternoon     Interval History Status: improved.     Patient is resting in bed, requiring 6 L oxygen but denies shortness of breath.  Denies any cough, fevers or chills, nausea vomiting, chest pain or other acute complaints.  Received another unit of blood today for hemoglobin less than 7    Brief History:     This is a 72-year-old male that presents with a complaint of shortness of breath that was sudden onset.  He presented emergency room was found

## 2025-06-04 NOTE — PROGRESS NOTES
Blood consent was confirmed   Blood was dual signed per writer and an additional RNForeign  Blood touched vein @ 0720  Vitals stable. Vitals were taken within 30 minutes prior to blood administration and 15 minutes after start    Patient was observed for first 15 minutes per writer  No reaction noted. Will continue to monitor

## 2025-06-04 NOTE — PROGRESS NOTES
Per [facility] approved formulary policy, SGLT2 Inhibitors are not stocked or supplied for our inpatients.     SGLT2s are only formulary with the indication of CKD or CHF therefore:    Your order for dapaglifozin has been interchanged to EMPAglifozin for the duration of the hospital admission.  This is for use on patients who have CKD or CHF.    Please contact the pharmacy with any questions or concerns.  Thank you.  Lili Wilcox RPH, RPcorey/PharmD 6/4/2025 4:10 PM

## 2025-06-04 NOTE — PLAN OF CARE
Patient A/O x4.  Pt on 7L NC salter, continuous pulse ox in place.  Cardiology notified of elevated 9PM troponin of 2887. EKG and vitals obtained. Per cardio \"If no chest pain you can continue current management\". Orders for troponin placed for the AM.   Morning troponin 2702. Cardio notified.  Pt denied chest pain throughout shift.  Pt has received 2 units of PRBC's total this admission.  Pt on IV azithro and cefepime.  Hgb 6.8 this morning. 1 unit PRBC's ordered.  External cath in place.  Safety maintained - bed locked, in lowest position, side rails up x2, alarm activated. Call light placed within reach.    Problem: Chronic Conditions and Co-morbidities  Goal: Patient's chronic conditions and co-morbidity symptoms are monitored and maintained or improved  6/4/2025 0251 by Foreign Stevens RN  Outcome: Progressing     Problem: Discharge Planning  Goal: Discharge to home or other facility with appropriate resources  6/4/2025 0251 by Foreign Stevens RN  Outcome: Progressing     Problem: Safety - Adult  Goal: Free from fall injury  6/4/2025 0251 by Foreign Stevens RN  Outcome: Progressing     Problem: Skin/Tissue Integrity  Goal: Skin integrity remains intact  Description: 1.  Monitor for areas of redness and/or skin breakdown2.  Assess vascular access sites hourly3.  Every 4-6 hours minimum:  Change oxygen saturation probe site  6/4/2025 0251 by Foreign Stevens RN  Outcome: Progressing     Problem: ABCDS Injury Assessment  Goal: Absence of physical injury  6/4/2025 0251 by Foreign Stevens RN  Outcome: Progressing     Problem: Cardiovascular - Adult  Goal: Maintains optimal cardiac output and hemodynamic stability  Outcome: Progressing     Problem: Cardiovascular - Adult  Goal: Absence of cardiac dysrhythmias or at baseline  Outcome: Progressing     Problem: Hematologic - Adult  Goal: Maintains hematologic stability  Outcome: Progressing

## 2025-06-04 NOTE — PROGRESS NOTES
Occupational Therapy  Occupational Therapy Initial Evaluation  Facility/Department: [facility]   Patient Name: Duane Adamczak        MRN: [id number]    : 1935    Date of Service: 2025    Discharge Recommendations  Discharge Recommendations: Patient would benefit from continued therapy after discharge  OT Equipment Recommendations  Other: Pt currently functioning below baseline.  Recommend daily inpatient skilled therapy at time of discharge to maximize long term outcomes and prevent re-admission. Please refer to AM-PAC score for current level of function.    Chief Complaint   Patient presents with    Shortness of Breath     Onset this afternoon     Past Medical History:  has a past medical history of Abnormal EKG, Anesthesia complication, Atrial fibrillation (HCC), CAD (coronary artery disease), Cancer (HCC), CHF (congestive heart failure) (HCC), CKD (chronic kidney disease), Clinical trial participant, Colon cancer (HCC), COVID-19, Diabetes mellitus (HCC), Dribbling urine, Forgetfulness, Gout, H/O cataract, H/O migraine, H/O prostate cancer, Hematuria, HTN (hypertension), Hyperlipidemia, Kidney stones, MGUS (monoclonal gammopathy of unknown significance), SUKUMAR (obstructive sleep apnea), Parkinson disease (HCC), Renal insufficiency, Supplemental oxygen dependent, Under care of team, Under care of team, Vasovagal episode, Wears glasses, Wears partial dentures, and Wellness examination.  Past Surgical History:  has a past surgical history that includes transesophageal echocardiogram (N/A, 2019); Lithotripsy; Prostatectomy; Bladder surgery; Cholecystectomy; Total knee arthroplasty; Appendectomy; Rotator cuff repair (Left); Cardioversion (N/A, 2019); Colonoscopy; Laparoscopic left colon resection; Esophagogastroduodenoscopy (2023); Upper gastrointestinal endoscopy (N/A, 2023); proctosigmoidoscopy (N/A, 2023); Upper gastrointestinal endoscopy (2023); skin biopsy; and

## 2025-06-05 LAB
ABO/RH: NORMAL
ANION GAP SERPL CALCULATED.3IONS-SCNC: 11 MMOL/L (ref 9–16)
ANTIBODY SCREEN: NEGATIVE
ARM BAND NUMBER: NORMAL
BASOPHILS # BLD: <0.03 K/UL (ref 0–0.2)
BASOPHILS NFR BLD: 0 % (ref 0–2)
BLOOD BANK DISPENSE STATUS: NORMAL
BLOOD BANK SAMPLE EXPIRATION: NORMAL
BPU ID: NORMAL
BUN SERPL-MCNC: 37 MG/DL (ref 8–23)
CALCIUM SERPL-MCNC: 9.1 MG/DL (ref 8.8–10.2)
CHLORIDE SERPL-SCNC: 100 MMOL/L (ref 98–107)
CO2 SERPL-SCNC: 26 MMOL/L (ref 20–31)
COMPONENT: NORMAL
CREAT SERPL-MCNC: 1.6 MG/DL (ref 0.7–1.2)
CROSSMATCH RESULT: NORMAL
EOSINOPHIL # BLD: 0.11 K/UL (ref 0–0.44)
EOSINOPHILS RELATIVE PERCENT: 2 % (ref 1–4)
ERYTHROCYTE [DISTWIDTH] IN BLOOD BY AUTOMATED COUNT: 19.9 % (ref 11.8–14.4)
GFR, ESTIMATED: 40 ML/MIN/1.73M2
GLUCOSE SERPL-MCNC: 107 MG/DL (ref 82–115)
HCT VFR BLD AUTO: 22.7 % (ref 40.7–50.3)
HGB BLD-MCNC: 7.8 G/DL (ref 13–17)
IMM GRANULOCYTES # BLD AUTO: 0.02 K/UL (ref 0–0.3)
IMM GRANULOCYTES NFR BLD: 0 %
LYMPHOCYTES NFR BLD: 4.48 K/UL (ref 1.1–3.7)
LYMPHOCYTES RELATIVE PERCENT: 69 % (ref 24–43)
MCH RBC QN AUTO: 34.2 PG (ref 25.2–33.5)
MCHC RBC AUTO-ENTMCNC: 34.4 G/DL (ref 28.4–34.8)
MCV RBC AUTO: 99.6 FL (ref 82.6–102.9)
MONOCYTES NFR BLD: 0.34 K/UL (ref 0.1–1.2)
MONOCYTES NFR BLD: 5 % (ref 3–12)
NEUTROPHILS NFR BLD: 24 % (ref 36–65)
NEUTS SEG NFR BLD: 1.57 K/UL (ref 1.5–8.1)
NRBC BLD-RTO: 0 PER 100 WBC
PLATELET # BLD AUTO: 182 K/UL (ref 138–453)
PMV BLD AUTO: 10.2 FL (ref 8.1–13.5)
POTASSIUM SERPL-SCNC: 3.6 MMOL/L (ref 3.7–5.3)
RBC # BLD AUTO: 2.28 M/UL (ref 4.21–5.77)
RBC # BLD: ABNORMAL 10*6/UL
SODIUM SERPL-SCNC: 136 MMOL/L (ref 136–145)
TRANSFUSION STATUS: NORMAL
UNIT DIVISION: 0
WBC OTHER # BLD: 6.5 K/UL (ref 3.5–11.3)

## 2025-06-05 PROCEDURE — 6360000002 HC RX W HCPCS: Performed by: STUDENT IN AN ORGANIZED HEALTH CARE EDUCATION/TRAINING PROGRAM

## 2025-06-05 PROCEDURE — 97110 THERAPEUTIC EXERCISES: CPT

## 2025-06-05 PROCEDURE — 6370000000 HC RX 637 (ALT 250 FOR IP): Performed by: INTERNAL MEDICINE

## 2025-06-05 PROCEDURE — 2580000003 HC RX 258: Performed by: STUDENT IN AN ORGANIZED HEALTH CARE EDUCATION/TRAINING PROGRAM

## 2025-06-05 PROCEDURE — 2700000000 HC OXYGEN THERAPY PER DAY

## 2025-06-05 PROCEDURE — 36415 COLL VENOUS BLD VENIPUNCTURE: CPT

## 2025-06-05 PROCEDURE — 99232 SBSQ HOSP IP/OBS MODERATE 35: CPT | Performed by: INTERNAL MEDICINE

## 2025-06-05 PROCEDURE — 97116 GAIT TRAINING THERAPY: CPT

## 2025-06-05 PROCEDURE — 97535 SELF CARE MNGMENT TRAINING: CPT

## 2025-06-05 PROCEDURE — 2500000003 HC RX 250 WO HCPCS: Performed by: STUDENT IN AN ORGANIZED HEALTH CARE EDUCATION/TRAINING PROGRAM

## 2025-06-05 PROCEDURE — 94761 N-INVAS EAR/PLS OXIMETRY MLT: CPT

## 2025-06-05 PROCEDURE — 1200000000 HC SEMI PRIVATE

## 2025-06-05 PROCEDURE — 85025 COMPLETE CBC W/AUTO DIFF WBC: CPT

## 2025-06-05 PROCEDURE — 80048 BASIC METABOLIC PNL TOTAL CA: CPT

## 2025-06-05 RX ORDER — FUROSEMIDE 40 MG/1
40 TABLET ORAL DAILY
Status: DISCONTINUED | OUTPATIENT
Start: 2025-06-06 | End: 2025-06-06 | Stop reason: HOSPADM

## 2025-06-05 RX ORDER — POTASSIUM CHLORIDE 1500 MG/1
40 TABLET, EXTENDED RELEASE ORAL ONCE
Status: COMPLETED | OUTPATIENT
Start: 2025-06-05 | End: 2025-06-05

## 2025-06-05 RX ADMIN — OXYCODONE HYDROCHLORIDE AND ACETAMINOPHEN 1000 MG: 500 TABLET ORAL at 08:27

## 2025-06-05 RX ADMIN — FUROSEMIDE 40 MG: 10 INJECTION, SOLUTION INTRAMUSCULAR; INTRAVENOUS at 08:27

## 2025-06-05 RX ADMIN — SERTRALINE 25 MG: 25 TABLET, FILM COATED ORAL at 08:27

## 2025-06-05 RX ADMIN — Medication 2000 UNITS: at 08:27

## 2025-06-05 RX ADMIN — EMPAGLIFLOZIN 10 MG: 10 TABLET, FILM COATED ORAL at 08:27

## 2025-06-05 RX ADMIN — SODIUM CHLORIDE, PRESERVATIVE FREE 10 ML: 5 INJECTION INTRAVENOUS at 23:08

## 2025-06-05 RX ADMIN — CEFEPIME 2000 MG: 2 INJECTION, POWDER, FOR SOLUTION INTRAVENOUS at 11:39

## 2025-06-05 RX ADMIN — POTASSIUM CHLORIDE 40 MEQ: 1500 TABLET, EXTENDED RELEASE ORAL at 11:35

## 2025-06-05 RX ADMIN — FOLIC ACID 1 MG: 1 TABLET ORAL at 08:42

## 2025-06-05 RX ADMIN — RIVASTIGMINE TARTRATE 6 MG: 1.5 CAPSULE ORAL at 23:00

## 2025-06-05 RX ADMIN — CEFEPIME 2000 MG: 2 INJECTION, POWDER, FOR SOLUTION INTRAVENOUS at 23:11

## 2025-06-05 RX ADMIN — SODIUM CHLORIDE, PRESERVATIVE FREE 10 ML: 5 INJECTION INTRAVENOUS at 08:30

## 2025-06-05 RX ADMIN — CYANOCOBALAMIN TAB 1000 MCG 1000 MCG: 1000 TAB at 09:08

## 2025-06-05 RX ADMIN — RIVASTIGMINE TARTRATE 6 MG: 1.5 CAPSULE ORAL at 08:27

## 2025-06-05 NOTE — PROGRESS NOTES
[facility]  Office: [phone]  Nick Rivas, DO, Jorge Lynn, DO, Joaquim Conteh DO, James Monae, DO, Les Johnson MD, Odalis Pressley MD, Enrico Santos MD, Shasha Mckenna MD,  Yury Moss MD, May Perez MD, Burke Polk MD,  Satya Rahman DO, Ty Shelton MD, Chidi Oliveros MD, Kulwinder Rivas DO, Elizabeth Barajas MD,  Juan Lewis DO, Deann Plummer MD, Camila Chandra MD, Buck Hewitt MD,  Luc Hanks MD, Raleigh Hudson MD, Leslye Zuniga MD, Kenny Metz MD, Azam Fink MD, Cheryle Menard MD, Edwin Gaviria, DO, Lyndsey Spicer MD, Jennifer Lobato DO, Michael Washington MD, Satya Alejandro MD, Mohsin Reza, MD, Racquel Moy MD, Shirley Waterhouse, CNP,  Alyssa Yusuf, CNP, Edwin Crabtree, CNP,  Carleen Pratt, BASIA, Janina Zuluaga, CNP, Poppy Couch, CNP, Liz Honeycutt, CNP, Daisy Rashid, CNP, Violetta Dahl, PA-C, Florence Choi, CNP, Silvino Chaves, CNP,  Maria R Espinosa, CNP, Linda Ryan, CNP, Yassine Valverde, PA-C, Tawny Kaufman, CNP,  Kenzie Infante, CNS, Trini Gibbons, CNP, Ledy Stone, CNP,   Radha Naylor, CNP         [facility]   IN-PATIENT SERVICE   [facility]    Progress Note    6/5/2025    10:15 AM    Name:   Duane Adamczak  MRN:     [id number]     Acct:      [id number]   Room:   1007/1007-02  IP Day:  2  Admit Date:  6/2/2025  9:24 PM    PCP:   Toña Yi MD  Code Status:  Full Code    Subjective:     C/C:   Chief Complaint   Patient presents with    Shortness of Breath     Onset this afternoon     Interval History Status: improved.     Patient overall feels much better, has increased oxygen requirements today.  Denies any chest pain, shortness of breath, nausea or vomiting, fevers or chills or acute complaints.    Brief History:     This is a 72-year-old male that presents with a complaint of shortness of breath that was sudden onset.  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  Uses oxygen at 2 L as needed at home  Continue home Parkinson's meds  Transfuse as needed for hemoglobin less than 7, hematology evaluation progress  PT and OT  GI and DVT prophylaxis  Trend labs, correct electrolytes as needed, one-time dose of potassium chloride today  Antihypertensives as ordered  Discharge planning pending progress with therapy    Joaquim Conteh DO  6/5/2025  10:15 AM

## 2025-06-05 NOTE — PROGRESS NOTES
Physical Therapy  Facility/Department: [facility] PROGRESSIVE CARE  Daily Treatment Note  NAME: Duane Adamczak  : 1935  MRN: [id number]    Date of Service: 2025    Discharge Recommendations:    Pt currently functioning below baseline.  Recommend daily therapy at time of discharge to maximize long term outcomes and prevent re-admission. Please refer to AM-PAC score for current level of function.        Patient Diagnosis(es): The primary encounter diagnosis was Acute hypoxic respiratory failure (HCC). Diagnoses of Pneumonia due to infectious organism, unspecified laterality, unspecified part of lung, Congestive heart failure, unspecified HF chronicity, unspecified heart failure type (HCC), and Shortness of breath were also pertinent to this visit.    Assessment  Assessment: Pt. improved since yesterday. Now on 1L O2 (was on 6L) and able to amb. in room (only able to do bed to chair transfer yesterday.) Pt. is a high fall risk, pushing RW too far ahead and has severe kyphosis. Able to somewhat correct posture using RW for support, but can't maintain. Pt. should not be amb. without gait belt/ close assist at this time.    Activity Tolerance: Patient limited by endurance;Treatment limited secondary to decreased cognition    Plan  Physical Therapy Plan  General Plan: 5-7 times per week  Current Treatment Recommendations: Strengthening;ROM;Balance training;Functional mobility training;Transfer training;Endurance training;Gait training;Home exercise program;Safety education & training;Patient/Caregiver education & training;Equipment evaluation, education, & procurement;Therapeutic activities    Restrictions  Restrictions/Precautions  Restrictions/Precautions: Fall Risk, General Precautions, Bed Alarm  Position Activity Restriction  Other Position/Activity Restrictions: O2- 1L, continuous pulse ox, purewick     Subjective   Subjective  Subjective: \"I am having a better day today!\"  Pain: No c/o pain    Objective  Vitals

## 2025-06-05 NOTE — PROGRESS NOTES
Spiritual Health History and Assessment/Progress Note  [facility]    (P) Initial Encounter,  ,  ,      Name: Duane Adamczak MRN: [id number]    Age: 89 y.o.     Sex: male   Language: English   Jainism: Restorationism   Acute on chronic diastolic (congestive) heart failure (HCC)     Date: 6/5/2025            Total Time Calculated: (P) 12 min              Spiritual Assessment began in [facility]        Referral/Consult From: (P) Rounding   Encounter Overview/Reason: (P) Initial Encounter  Service Provided For: (P) Patient    Patient engaged readily and pleasantly with  sharing life review of present illness, being and [employer] , now claiming VA benefits, many years of marriage, the \"frustrations of getting old\" but demonstrated an energetic mind.  provided active and empathetic listening, acknowledgement and validation of patient's feelings and concerns, interactive conversation, thanks for patient's service to our country and friendship. Patient thanked  for \"your good work and support.\"    Lissette, Belief, Meaning:   Patient is connected with a lissette tradition or spiritual practice and has beliefs or practices that help with coping during difficult times  Family/Friends No family/friends present      Importance and Influence:  Patient has spiritual/personal beliefs that influence decisions regarding their health  Family/Friends No family/friends present    Community:  Patient feels well-supported. Support system includes: Children  Family/Friends No family/friends present    Assessment and Plan of Care:     Patient Interventions include: Facilitated expression of thoughts and feelings, Explored spiritual coping/struggle/distress, Affirmed coping skills/support systems, and Facilitated life review and/ or legacy  Family/Friends Interventions include: No family/friends present    Patient Plan of Care: Spiritual Care available upon further referral  Family/Friends Plan of  Care: Spiritual Care available upon further referral    Electronically signed by Chaplain Hien on 6/5/2025 at 10:45 AM

## 2025-06-05 NOTE — CARE COORDINATION
Social Work-Spoke with daughter, Preeti. Discussed progress in therapy. Discussed short term rehab and offered choice. If rehab is needed, she prefers acute rehab at [facility]. Sent referral. She is checking with the [facility] to see if patient qualifies for additional services at [facility]. She is also checking on the provider for O2. Aroldo

## 2025-06-05 NOTE — PROGRESS NOTES
Section of Cardiology  Progress Note      Date:  6/5/2025  Patient: Duane Adamczak  Admission:  6/2/2025  9:24 PM  Admit DX: Pneumonia due to infectious organism, unspecified laterality, unspecified part of lung [J18.9]  Acute hypoxic respiratory failure (HCC) [J96.01]  Age:  89 y.o., 1935     LOS: 2 days     Reason for evaluation:   [id number]      SUBJECTIVE:     The patient was seen and examined. Notes and labs reviewed.  Pt has done well with IV diuretics  Creat is improving to 1.6  He is mostly off O2  No crackles in lungs  Denies CP, SOB, palpitations, edema  Hb has been stable  No acute complaints today  Eating and drinking well      OBJECTIVE:      EXAM:   Vitals:    VITALS:  /72   Pulse 69   Temp 98.4 °F (36.9 °C) (Oral)   Resp 16   Ht 1.727 m (5' 8\")   Wt 82.6 kg (182 lb)   SpO2 97%   BMI 27.67 kg/m²   24HR INTAKE/OUTPUT:    Intake/Output Summary (Last 24 hours) at 6/5/2025 1321  Last data filed at 6/5/2025 1130  Gross per 24 hour   Intake --   Output 3350 ml   Net -3350 ml       CONSTITUTIONAL:  awake, alert, cooperative, no apparent distress, and appears stated age.  HEENT: Normal jugular venous pulsations, no carotid bruits. Head is atraumatic, normocephalic. Eyes and oral mucosa are normal.  LUNGS: Fine basal crackles  CARDIOVASCULAR:  Normal apical impulse, regular rate and rhythm, normal S1 and S2, no S3 or S4, and no murmur or rub noted.  ABDOMEN: Soft, nontender, nondistended. Bowel sounds present. No masses or tenderness. No bruit.  SKIN: Warm and dry.  EXTREMITIES:No lower extremity edema.     Current Inpatient Medications:   epoetin stefano-epbx  10,000 Units SubCUTAneous Once per day on Monday Wednesday Friday    cefepime  2,000 mg IntraVENous Q12H    furosemide  40 mg IntraVENous Daily    ascorbic acid  1,000 mg Oral Daily    empagliflozin  10 mg Oral Daily    folic acid  1 mg Oral Daily    rivastigmine  6 mg Oral BID    sertraline  25 mg Oral Daily    vitamin B-12  1,000     HDL 26 11/22/2023 05:23 AM    TRIG 94 11/22/2023 05:23 AM     LIVER PROFILE:No results for input(s): \"AST\", \"ALT\", \"LABALBU\", \"ALKPHOS\", \"BILITOT\", \"BILIDIR\", \"IBILI\", \"GLOB\", \"ALBUMIN\" in the last 72 hours.    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  Stop IV lasix and switch to PO 40mg OD for diastolic dysfunction  Wean O2  PT and OT  Monitor Hb and further workup per primary as needed  Cont other medications  If Hb stable and no concern for bleeding, restart eliquis on discharge due to history of pAfib  ECHO stable and no plans for invasive workup. Consider OP stress test as needed  Will follow while IP      Discussed with patient and family and nursing.    Ant Liang MD

## 2025-06-05 NOTE — PLAN OF CARE
Patient resting throughout the night, vitals stable and patient denies pain.   Hemoglobin stable at this time at 7.8, received 3 units total this admission- hx of leukemia.   Trops trending down, Echo completed with an EF of 50%. Receiving Iv lasix.  Receiving Zithro and vanco. On 1L via nasal canula.   Free from falls at this time, all needs met.    Problem: Chronic Conditions and Co-morbidities  Goal: Patient's chronic conditions and co-morbidity symptoms are monitored and maintained or improved  Outcome: Progressing  Flowsheets (Taken 6/4/2025 2200)  Care Plan - Patient's Chronic Conditions and Co-Morbidity Symptoms are Monitored and Maintained or Improved: Monitor and assess patient's chronic conditions and comorbid symptoms for stability, deterioration, or improvement     Problem: Discharge Planning  Goal: Discharge to home or other facility with appropriate resources  Outcome: Progressing  Flowsheets (Taken 6/4/2025 2200)  Discharge to home or other facility with appropriate resources: Identify barriers to discharge with patient and caregiver     Problem: Safety - Adult  Goal: Free from fall injury  Outcome: Progressing     Problem: Cardiovascular - Adult  Goal: Maintains optimal cardiac output and hemodynamic stability  Outcome: Progressing  Flowsheets (Taken 6/4/2025 2200)  Maintains optimal cardiac output and hemodynamic stability: Monitor blood pressure and heart rate     Problem: Hematologic - Adult  Goal: Maintains hematologic stability  Outcome: Progressing  Flowsheets (Taken 6/4/2025 2200)  Maintains hematologic stability: Assess for signs and symptoms of bleeding or hemorrhage     Problem: Respiratory - Adult  Goal: Achieves optimal ventilation and oxygenation  Outcome: Progressing  Flowsheets (Taken 6/4/2025 2200)  Achieves optimal ventilation and oxygenation: Assess for changes in respiratory status

## 2025-06-05 NOTE — CARE COORDINATION
Discharge planning    Chart reviewed. Patient lives with wife in  at [facility]. They are in the senior independent side and only services they use currently are passing of medications.     He has 02 but unsure who it is through. ( [employer], [facility], [facility] and [facility] denied having patient ) He has RW.     Current with [facility]     Admitted with CHF, history of MGUS. Oncology, cardiology following. Patient will not have ac due to anemia. Patient has had 3 total units of blood     Seen by PT and needing up to 6 L for exertion. May need LTAC vs ARU vs SNF vs [place] with [facility].     Discussed with ss and she will call daughter to follow up on.

## 2025-06-05 NOTE — PLAN OF CARE
Problem: Chronic Conditions and Co-morbidities  Goal: Patient's chronic conditions and co-morbidity symptoms are monitored and maintained or improved  6/5/2025 1310 by Yazmin Vaughan RN  Outcome: Progressing  Flowsheets (Taken 6/5/2025 0830)  Care Plan - Patient's Chronic Conditions and Co-Morbidity Symptoms are Monitored and Maintained or Improved: Monitor and assess patient's chronic conditions and comorbid symptoms for stability, deterioration, or improvement  6/5/2025 0546 by Nella Amezcua RN  Outcome: Progressing  Flowsheets (Taken 6/4/2025 2200)  Care Plan - Patient's Chronic Conditions and Co-Morbidity Symptoms are Monitored and Maintained or Improved: Monitor and assess patient's chronic conditions and comorbid symptoms for stability, deterioration, or improvement     Problem: Discharge Planning  Goal: Discharge to home or other facility with appropriate resources  6/5/2025 1310 by Yazmin Vaughan RN  Outcome: Progressing  6/5/2025 0546 by Nella Amezcua RN  Outcome: Progressing  Flowsheets (Taken 6/4/2025 2200)  Discharge to home or other facility with appropriate resources: Identify barriers to discharge with patient and caregiver     Problem: Safety - Adult  Goal: Free from fall injury  6/5/2025 1310 by Yazmin Vaughan RN  Outcome: Progressing  6/5/2025 0546 by Nella Amezcua RN  Outcome: Progressing     Problem: Skin/Tissue Integrity  Goal: Skin integrity remains intact  Description: 1.  Monitor for areas of redness and/or skin breakdown2.  Assess vascular access sites hourly3.  Every 4-6 hours minimum:  Change oxygen saturation probe site4.  Every 4-6 hours:  If on nasal continuous positive airway pressure, respiratory therapy assess nares and determine need for appliance change or resting period  6/5/2025 1310 by Yazmin Vaughan RN  Outcome: Progressing  Flowsheets (Taken 6/5/2025 0830)  Skin Integrity Remains Intact: Monitor for areas of redness and/or skin breakdown  6/5/2025 0546 by Seven  RIKKI Carmen  Outcome: Progressing  Flowsheets (Taken 6/4/2025 2200)  Skin Integrity Remains Intact: Monitor for areas of redness and/or skin breakdown     Problem: ABCDS Injury Assessment  Goal: Absence of physical injury  6/5/2025 1310 by Yazmin Vaughan RN  Outcome: Progressing  6/5/2025 0546 by Nella Amezcua RN  Outcome: Progressing     Problem: Cardiovascular - Adult  Goal: Maintains optimal cardiac output and hemodynamic stability  6/5/2025 1310 by Yazmin Vaughan RN  Outcome: Progressing  6/5/2025 0546 by Nella Amezcua RN  Outcome: Progressing  Flowsheets (Taken 6/4/2025 2200)  Maintains optimal cardiac output and hemodynamic stability: Monitor blood pressure and heart rate  Goal: Absence of cardiac dysrhythmias or at baseline  6/5/2025 1310 by Yazmin Vaughan RN  Outcome: Progressing  6/5/2025 0546 by Nella Amezcua RN  Outcome: Progressing  Flowsheets (Taken 6/4/2025 2200)  Absence of cardiac dysrhythmias or at baseline: Monitor cardiac rate and rhythm     Problem: Hematologic - Adult  Goal: Maintains hematologic stability  6/5/2025 1310 by Yazmin Vaughan RN  Outcome: Progressing  Flowsheets (Taken 6/5/2025 0830)  Maintains hematologic stability: Assess for signs and symptoms of bleeding or hemorrhage  6/5/2025 0546 by Nella Amezcua RN  Outcome: Progressing  Flowsheets (Taken 6/4/2025 2200)  Maintains hematologic stability: Assess for signs and symptoms of bleeding or hemorrhage     Problem: Respiratory - Adult  Goal: Achieves optimal ventilation and oxygenation  6/5/2025 1310 by Yazmin Vaughan RN  Outcome: Progressing  6/5/2025 0546 by Nella Amezcua RN  Outcome: Progressing  Flowsheets (Taken 6/4/2025 2200)  Achieves optimal ventilation and oxygenation: Assess for changes in respiratory status

## 2025-06-05 NOTE — PROGRESS NOTES
Occupational Therapy  Occupational Therapy Daily Treatment Note  Facility/Department: [facility]   Patient Name: Duane Adamczak        MRN: [id number]    : 1935    Date of Service: 2025    Discharge Recommendations  Discharge Recommendations: Patient would benefit from continued therapy after discharge, 24 hour supervision or assist  OT Equipment Recommendations  Other: Pt currently functioning below baseline.  Recommend daily inpatient skilled therapy at time of discharge to maximize long term outcomes and prevent re-admission. Please refer to AM-PAC score for current level of function.    Chief Complaint   Patient presents with    Shortness of Breath     Onset this afternoon     Past Medical History:  has a past medical history of Abnormal EKG, Anesthesia complication, Atrial fibrillation (HCC), CAD (coronary artery disease), Cancer (HCC), CHF (congestive heart failure) (HCC), CKD (chronic kidney disease), Clinical trial participant, Colon cancer (HCC), COVID-19, Diabetes mellitus (HCC), Dribbling urine, Forgetfulness, Gout, H/O cataract, H/O migraine, H/O prostate cancer, Hematuria, HTN (hypertension), Hyperlipidemia, Kidney stones, MGUS (monoclonal gammopathy of unknown significance), SUKUMAR (obstructive sleep apnea), Parkinson disease (HCC), Renal insufficiency, Supplemental oxygen dependent, Under care of team, Under care of team, Vasovagal episode, Wears glasses, Wears partial dentures, and Wellness examination.  Past Surgical History:  has a past surgical history that includes transesophageal echocardiogram (N/A, 2019); Lithotripsy; Prostatectomy; Bladder surgery; Cholecystectomy; Total knee arthroplasty; Appendectomy; Rotator cuff repair (Left); Cardioversion (N/A, 2019); Colonoscopy; Laparoscopic left colon resection; Esophagogastroduodenoscopy (2023); Upper gastrointestinal endoscopy (N/A, 2023); proctosigmoidoscopy (N/A, 2023); Upper gastrointestinal

## 2025-06-05 NOTE — PROGRESS NOTES
Today's Date: 6/5/2025  Patient Name: Duane Adamczak  Date of admission: 6/2/2025  9:24 PM  Patient's age: 89 y.o., 1935  Admission Dx: Pneumonia due to infectious organism, unspecified laterality, unspecified part of lung [J18.9]  Acute hypoxic respiratory failure (HCC) [J96.01]    Reason for Consult: management recommendations  Requesting Physician: Clara Can MD    CHIEF COMPLAINT: Worsening shortness of breath    Interim history  Patient is seen and evaluated  Hemoglobin is stable and is 7.8 today.  Patient is receiving Procrit today.  Afebrile, feels well, close to baseline.    HISTORY OF PRESENT ILLNESS:      The patient is a 89 y.o.   male who is admitted to the hospital for worsening shortness of breath and chills.  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  Baseline hemoglobin is around between 8 and 10.  Platelets are normal and white count is normal.  But the differential leukocytic count showed predominant lymphocytosis and the patient ANC is running around 0.8-1.0.  The patient was found to be severely anemic with hemoglobin down to 6.2.  He has leukocytosis with increased left shift.  Cardiac workup showed ST depression and elevation of the troponin.  Patient is seen and evaluated.  Getting blood transfusion.    Past Medical History:   has a past medical history of Abnormal EKG, Anesthesia complication, Atrial fibrillation (HCC), CAD (coronary artery disease), Cancer (HCC), CHF (congestive heart failure) (HCC), CKD (chronic kidney disease), Clinical trial participant, Colon cancer (HCC), COVID-19, Diabetes mellitus (HCC), Dribbling urine, Forgetfulness, Gout, H/O cataract, H/O migraine, H/O prostate cancer, Hematuria, HTN (hypertension), Hyperlipidemia, Kidney stones, MGUS (monoclonal gammopathy of unknown significance), SUKUMAR (obstructive sleep apnea), Parkinson disease (HCC), Renal insufficiency, Supplemental oxygen dependent, Under care of team, Under care of  leukemia (HCC) [C91.Z0] 06/03/2025    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    No evidence of hemolysis.  Started on Procrit.  Will plan to continue as an outpatient  Appreciate cardiology input  Continue supportive care.  Patient can patient has stabilized.  Okay with discharge from my perspective.        Discussed with patient and Nurse.      Thank you for asking us to see this patient.    Satya Lazar MD  Hematologist/Medical Oncologist  Cell: [phone]

## 2025-06-06 VITALS
BODY MASS INDEX: 27.58 KG/M2 | WEIGHT: 182 LBS | TEMPERATURE: 97.7 F | DIASTOLIC BLOOD PRESSURE: 67 MMHG | HEART RATE: 66 BPM | OXYGEN SATURATION: 95 % | SYSTOLIC BLOOD PRESSURE: 111 MMHG | HEIGHT: 68 IN | RESPIRATION RATE: 18 BRPM

## 2025-06-06 LAB
ANION GAP SERPL CALCULATED.3IONS-SCNC: 12 MMOL/L (ref 9–16)
BASOPHILS # BLD: 0 K/UL (ref 0–0.2)
BASOPHILS NFR BLD: 0 %
BUN SERPL-MCNC: 40 MG/DL (ref 8–23)
CALCIUM SERPL-MCNC: 9.6 MG/DL (ref 8.8–10.2)
CHLORIDE SERPL-SCNC: 100 MMOL/L (ref 98–107)
CO2 SERPL-SCNC: 27 MMOL/L (ref 20–31)
CREAT SERPL-MCNC: 1.7 MG/DL (ref 0.7–1.2)
EOSINOPHIL # BLD: 0.13 K/UL (ref 0–0.4)
EOSINOPHILS RELATIVE PERCENT: 3 % (ref 1–4)
ERYTHROCYTE [DISTWIDTH] IN BLOOD BY AUTOMATED COUNT: 18.8 % (ref 11.8–14.4)
GFR, ESTIMATED: 40 ML/MIN/1.73M2
GLUCOSE SERPL-MCNC: 145 MG/DL (ref 82–115)
HCT VFR BLD AUTO: 25 % (ref 40.7–50.3)
HGB BLD-MCNC: 8.5 G/DL (ref 13–17)
IMM GRANULOCYTES # BLD AUTO: 0.04 K/UL (ref 0–0.3)
IMM GRANULOCYTES NFR BLD: 1 %
LYMPHOCYTES NFR BLD: 2.9 K/UL (ref 1–4.8)
LYMPHOCYTES RELATIVE PERCENT: 69 % (ref 24–44)
MAGNESIUM SERPL-MCNC: 1.8 MG/DL (ref 1.6–2.4)
MCH RBC QN AUTO: 33.9 PG (ref 25.2–33.5)
MCHC RBC AUTO-ENTMCNC: 34 G/DL (ref 28.4–34.8)
MCV RBC AUTO: 99.6 FL (ref 82.6–102.9)
MONOCYTES NFR BLD: 0.21 K/UL (ref 0.2–0.8)
MONOCYTES NFR BLD: 5 % (ref 1–7)
MORPHOLOGY: ABNORMAL
NEUTROPHILS NFR BLD: 22 % (ref 36–66)
NEUTS SEG NFR BLD: 0.92 K/UL (ref 1.8–7.7)
NRBC BLD-RTO: 0 PER 100 WBC
PLATELET # BLD AUTO: 201 K/UL (ref 138–453)
PMV BLD AUTO: 9.7 FL (ref 8.1–13.5)
POTASSIUM SERPL-SCNC: 3.5 MMOL/L (ref 3.7–5.3)
RBC # BLD AUTO: 2.51 M/UL (ref 4.21–5.77)
SODIUM SERPL-SCNC: 139 MMOL/L (ref 136–145)
WBC OTHER # BLD: 4.2 K/UL (ref 3.5–11.3)

## 2025-06-06 PROCEDURE — 80048 BASIC METABOLIC PNL TOTAL CA: CPT

## 2025-06-06 PROCEDURE — 6360000002 HC RX W HCPCS: Performed by: INTERNAL MEDICINE

## 2025-06-06 PROCEDURE — 94761 N-INVAS EAR/PLS OXIMETRY MLT: CPT

## 2025-06-06 PROCEDURE — 97530 THERAPEUTIC ACTIVITIES: CPT

## 2025-06-06 PROCEDURE — 85025 COMPLETE CBC W/AUTO DIFF WBC: CPT

## 2025-06-06 PROCEDURE — 36415 COLL VENOUS BLD VENIPUNCTURE: CPT

## 2025-06-06 PROCEDURE — 6370000000 HC RX 637 (ALT 250 FOR IP): Performed by: STUDENT IN AN ORGANIZED HEALTH CARE EDUCATION/TRAINING PROGRAM

## 2025-06-06 PROCEDURE — 2500000003 HC RX 250 WO HCPCS: Performed by: STUDENT IN AN ORGANIZED HEALTH CARE EDUCATION/TRAINING PROGRAM

## 2025-06-06 PROCEDURE — 2700000000 HC OXYGEN THERAPY PER DAY

## 2025-06-06 PROCEDURE — 97535 SELF CARE MNGMENT TRAINING: CPT

## 2025-06-06 PROCEDURE — 83735 ASSAY OF MAGNESIUM: CPT

## 2025-06-06 PROCEDURE — 6360000002 HC RX W HCPCS: Performed by: STUDENT IN AN ORGANIZED HEALTH CARE EDUCATION/TRAINING PROGRAM

## 2025-06-06 PROCEDURE — 97116 GAIT TRAINING THERAPY: CPT

## 2025-06-06 PROCEDURE — 6370000000 HC RX 637 (ALT 250 FOR IP): Performed by: INTERNAL MEDICINE

## 2025-06-06 PROCEDURE — 2580000003 HC RX 258: Performed by: STUDENT IN AN ORGANIZED HEALTH CARE EDUCATION/TRAINING PROGRAM

## 2025-06-06 PROCEDURE — 99231 SBSQ HOSP IP/OBS SF/LOW 25: CPT | Performed by: INTERNAL MEDICINE

## 2025-06-06 PROCEDURE — 99239 HOSP IP/OBS DSCHRG MGMT >30: CPT | Performed by: INTERNAL MEDICINE

## 2025-06-06 RX ORDER — FUROSEMIDE 40 MG/1
40 TABLET ORAL DAILY
Qty: 60 TABLET | Refills: 3 | Status: SHIPPED | OUTPATIENT
Start: 2025-06-07

## 2025-06-06 RX ORDER — GENTAMICIN SULFATE 3 MG/ML
1 SOLUTION/ DROPS OPHTHALMIC
Status: DISCONTINUED | OUTPATIENT
Start: 2025-06-06 | End: 2025-06-06 | Stop reason: HOSPADM

## 2025-06-06 RX ORDER — GENTAMICIN SULFATE 3 MG/ML
1 SOLUTION/ DROPS OPHTHALMIC EVERY 4 HOURS
Qty: 1 EACH | Refills: 0 | Status: SHIPPED | OUTPATIENT
Start: 2025-06-06

## 2025-06-06 RX ADMIN — CEFEPIME 2000 MG: 2 INJECTION, POWDER, FOR SOLUTION INTRAVENOUS at 10:48

## 2025-06-06 RX ADMIN — Medication 2000 UNITS: at 09:28

## 2025-06-06 RX ADMIN — SODIUM CHLORIDE, PRESERVATIVE FREE 10 ML: 5 INJECTION INTRAVENOUS at 09:24

## 2025-06-06 RX ADMIN — EMPAGLIFLOZIN 10 MG: 10 TABLET, FILM COATED ORAL at 09:28

## 2025-06-06 RX ADMIN — SERTRALINE 25 MG: 25 TABLET, FILM COATED ORAL at 09:29

## 2025-06-06 RX ADMIN — EPOETIN ALFA-EPBX 10000 UNITS: 10000 INJECTION, SOLUTION INTRAVENOUS; SUBCUTANEOUS at 13:42

## 2025-06-06 RX ADMIN — OXYCODONE HYDROCHLORIDE AND ACETAMINOPHEN 1000 MG: 500 TABLET ORAL at 09:28

## 2025-06-06 RX ADMIN — FOLIC ACID 1 MG: 1 TABLET ORAL at 09:28

## 2025-06-06 RX ADMIN — RIVASTIGMINE TARTRATE 6 MG: 1.5 CAPSULE ORAL at 09:28

## 2025-06-06 RX ADMIN — AZITHROMYCIN MONOHYDRATE 500 MG: 500 INJECTION, POWDER, LYOPHILIZED, FOR SOLUTION INTRAVENOUS at 00:41

## 2025-06-06 RX ADMIN — GENTAMICIN SULFATE 1 DROP: 3 SOLUTION OPHTHALMIC at 12:02

## 2025-06-06 RX ADMIN — FUROSEMIDE 40 MG: 40 TABLET ORAL at 09:28

## 2025-06-06 RX ADMIN — CYANOCOBALAMIN TAB 1000 MCG 1000 MCG: 1000 TAB at 09:28

## 2025-06-06 NOTE — DISCHARGE SUMMARY
[facility]  Office: [phone]  Nick Rivas DO, Jorge Lynn DO, Joaquim Conteh DO, James Monae DO, Les Johnson MD, Odalis Pressley MD, Enrico Santos MD, Shasha Mckenna MD,  Yury Moss MD, May Perez MD, Burke Polk MD,  Satya Rahman DO, Ty Shelton MD, Chidi Oliveros MD, Kulwinder Rivas DO, Elizabeth Barajas MD,  Juan Lewis DO, Deann Plummer MD, Camila Chandra MD, Buck Hewitt MD,  Luc Hanks MD, Raleigh Hudson MD, Leslye Zuniga MD, Kenny Metz MD, Azam Fink MD, Cheryle Menard MD, Edwin Gaviria, DO, Lyndsey Spicer MD, Jennifer Lobato DO, Michael Washington MD, Satya Alejandro MD, Mohsin Reza, MD, Racquel Moy MD, Shirley Waterhouse, CNP,  Alyssa Yusuf, CNP, Edwin Crabtree, CNP,  Carleen Pratt, BASIA, Janina Zuluaga, CNP, Poppy Couch, CNP, Liz Honeycutt, CNP, Daisy Rashid, CNP, Violetta Dahl, PA-C, Florence Choi, CNP, Silvino Chaves, CNP,  Maria R Espinosa, CNP, Linda Ryan, CNP, Yassine Valverde, PA-C, Tawny Kaufman, CNP,  Kenzie Infante, CNS, Tirni Gibbons, CNP, Ledy Stone, CNP,   Radha Naylor, CNP         [facility]   IN-PATIENT SERVICE   [facility]    Discharge Summary     Patient ID: Duane Adamczak  :  1935   MRN: [id number]     ACCOUNT:  [id number]   Patient's PCP: Toña Yi MD  Admit Date: 2025   Discharge Date: 2025     Length of Stay: 3  Code Status:  Full Code  Admitting Physician: Clara Can MD  Discharge Physician: Joaquim Conteh DO     Active Discharge Diagnoses:     Hospital Problem Lists:  Principal Problem:    Acute on chronic diastolic (congestive) heart failure (HCC)  Active Problems:    Essential hypertension    Parkinson's disease (HCC)    Macrocytic anemia    Acute hypoxic respiratory failure (HCC)    Large granular lymphocytic leukemia (HCC)    Anemia associated with chemotherapy  Resolved Problems:    * No resolved hospital problems. *      Admission Condition:       STOP taking these medications      bumetanide 1 MG tablet  Commonly known as: BUMEX               Where to Get Your Medications        These medications were sent to [id number] - [place], [place] - [address] - P [phone] - F [phone]  [address] [place] [address]      Phone: [phone]   furosemide 40 MG tablet  gentamicin 0.3 % ophthalmic solution         No discharge procedures on file.    Time Spent on discharge is  35 mins in patient examination, evaluation, counseling as well as medication reconciliation, prescriptions for required medications, discharge plan and follow up.    Electronically signed by   Joaquim Conteh DO  6/6/2025  12:46 PM      Thank you Toña Miguel MD for the opportunity to be involved in this patient's care.

## 2025-06-06 NOTE — PROGRESS NOTES
Patient wants to try medrol jonathan, please advise on what dose and directions I can send for pt     And order for PM pending..   Physical Therapy  Facility/Department: [facility]   Physical Therapy Daily Treatment Note    Patient Name: Duane Adamczak        MRN: [id number]    : 1935    Date of Service: 2025    Chief Complaint   Patient presents with    Shortness of Breath     Onset this afternoon     Past Medical History:  has a past medical history of Abnormal EKG, Anesthesia complication, Atrial fibrillation (HCC), CAD (coronary artery disease), Cancer (HCC), CHF (congestive heart failure) (HCC), CKD (chronic kidney disease), Clinical trial participant, Colon cancer (HCC), COVID-19, Diabetes mellitus (HCC), Dribbling urine, Forgetfulness, Gout, H/O cataract, H/O migraine, H/O prostate cancer, Hematuria, HTN (hypertension), Hyperlipidemia, Kidney stones, MGUS (monoclonal gammopathy of unknown significance), SUKUMAR (obstructive sleep apnea), Parkinson disease (HCC), Renal insufficiency, Supplemental oxygen dependent, Under care of team, Under care of team, Vasovagal episode, Wears glasses, Wears partial dentures, and Wellness examination.  Past Surgical History:  has a past surgical history that includes transesophageal echocardiogram (N/A, 2019); Lithotripsy; Prostatectomy; Bladder surgery; Cholecystectomy; Total knee arthroplasty; Appendectomy; Rotator cuff repair (Left); Cardioversion (N/A, 2019); Colonoscopy; Laparoscopic left colon resection; Esophagogastroduodenoscopy (2023); Upper gastrointestinal endoscopy (N/A, 2023); proctosigmoidoscopy (N/A, 2023); Upper gastrointestinal endoscopy (2023); skin biopsy; and CT BIOPSY BONE MARROW (2025).    Discharge Recommendations  Discharge Recommendations: Patient would benefit from continued therapy after discharge   Due to recent hospitalization and medical condition, pt would benefit from additional intermittent skilled therapy at time of discharge.  Please refer to the AM-PAC score for current functional status.       Assessment  Body

## 2025-06-06 NOTE — PLAN OF CARE
No acute changes overnight, vitals stable and denies pain.   Hemoglobin stable. 3Unit total this admission.   Diuresing with IV Lasix, plan to switch to PO tomorrow.   Trops trending down.   On 1L via NC, room air at baseline.   Referral for [facility].   Free from falls, all needs met at this time.    Problem: Chronic Conditions and Co-morbidities  Goal: Patient's chronic conditions and co-morbidity symptoms are monitored and maintained or improved  6/6/2025 0140 by Nella Amezcua RN  Outcome: Progressing  Flowsheets (Taken 6/5/2025 2100)  Care Plan - Patient's Chronic Conditions and Co-Morbidity Symptoms are Monitored and Maintained or Improved: Monitor and assess patient's chronic conditions and comorbid symptoms for stability, deterioration, or improvement     Problem: Discharge Planning  Goal: Discharge to home or other facility with appropriate resources  6/6/2025 0140 by Nella Amezcua RN  Outcome: Progressing  Flowsheets (Taken 6/5/2025 2100)  Discharge to home or other facility with appropriate resources: Identify barriers to discharge with patient and caregiver  Problem: Cardiovascular - Adult  Goal: Maintains optimal cardiac output and hemodynamic stability  6/6/2025 0140 by Nella Amezcua RN  Outcome: Progressing  Flowsheets (Taken 6/5/2025 2100)  Maintains optimal cardiac output and hemodynamic stability: Monitor blood pressure and heart rate     Problem: Hematologic - Adult  Goal: Maintains hematologic stability  6/6/2025 0140 by Nella Amezcua RN  Outcome: Progressing  Flowsheets (Taken 6/5/2025 2100)  Maintains hematologic stability: Assess for signs and symptoms of bleeding or hemorrhage     Problem: Respiratory - Adult  Goal: Achieves optimal ventilation and oxygenation  6/6/2025 0140 by Nella Amezcua RN  Outcome: Progressing  Flowsheets (Taken 6/5/2025 2100)  Achieves optimal ventilation and oxygenation: Assess for changes in respiratory status

## 2025-06-06 NOTE — PROGRESS NOTES
[facility]  Office: [phone]  Nikc Rivas, DO, Jorge Lynn, DO, Joaquim Conteh DO, James Monae, DO, Les Johnson MD, Odalis Pressley MD, Enrico Santos MD, Shasha Mckenna MD,  Yury Moss MD, May Perez MD, Burke Polk MD,  Satya Rahman DO, Ty Shelton MD, Chidi Oliveros MD, Kulwinder Rivas DO, Elizabeth Barajas MD,  Juan Lewis DO, Deann Plummer MD, Camila Chandra MD, Buck Hewitt MD,  Luc Hanks MD, Raleigh Hudson MD, Leslye Zuniga MD, Kenny Metz MD, Azam Fink MD, Cheryle Menard MD, Edwin Gaviria, DO, Lyndsey Spicer MD, Jennifer Lobato DO, Michael Washington MD, Satya Alejandro MD, Mohsin Reza, MD, Racquel Moy MD, Shirley Waterhouse, CNP,  Alyssa Yusuf, CNP, Edwin Crabtree, CNP,  Carleen Pratt, BASIA, Janina Zuluaga, CNP, Poppy Couch, CNP, Liz Honeycutt, CNP, Daisy Rashid, CNP, Violetta Dahl, PA-C, Florence Choi, CNP, Silvino Chaves, CNP,  Maria R Espinosa, CNP, Linda Ryan, CNP, Yassine Valverde, PA-C, Tawny Kaufman, CNP,  Kenzie Infante, CNS, Trini Gibbons, CNP, Ledy Stone, CNP,   Radha Naylor, CNP         [facility]   IN-PATIENT SERVICE   [facility]    Progress Note    6/6/2025    10:15 AM    Name:   Duane Adamczak  MRN:     [id number]     Acct:      [id number]   Room:   1007/1007-02  IP Day:  3  Admit Date:  6/2/2025  9:24 PM    PCP:   Toña Yi MD  Code Status:  Full Code    Subjective:     C/C:   Chief Complaint   Patient presents with    Shortness of Breath     Onset this afternoon     Interval History Status: improved.     Patient continues improved, with oxygen has been weaned off.  Denies any complaints of chest pain, shortness of breath, nausea or vomiting, fevers or chills.    Brief History:     This is a 72-year-old male that presents with a complaint of shortness of breath that was sudden onset.  He presented emergency room was found to have evidence of pulmonary edema on chest x-ray versus possible pneumonia.

## 2025-06-06 NOTE — PROGRESS NOTES
Today's Date: 6/6/2025  Patient Name: Duane Adamczak  Date of admission: 6/2/2025  9:24 PM  Patient's age: 89 y.o., 1935  Admission Dx: Pneumonia due to infectious organism, unspecified laterality, unspecified part of lung [J18.9]  Acute hypoxic respiratory failure (HCC) [J96.01]    Reason for Consult: management recommendations  Requesting Physician: Clara Can MD    CHIEF COMPLAINT: Worsening shortness of breath    Interim history  Patient is seen and evaluated  Hemoglobin is stable and is 7.8 today.  Patient is receiving Procrit today.  Afebrile, feels well, close to baseline.    HISTORY OF PRESENT ILLNESS:      The patient is a 89 y.o.   male who is admitted to the hospital for worsening shortness of breath and chills.  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  Baseline hemoglobin is around between 8 and 10.  Platelets are normal and white count is normal.  But the differential leukocytic count showed predominant lymphocytosis and the patient ANC is running around 0.8-1.0.  The patient was found to be severely anemic with hemoglobin down to 6.2.  He has leukocytosis with increased left shift.  Cardiac workup showed ST depression and elevation of the troponin.  Patient is seen and evaluated.  Getting blood transfusion.    Past Medical History:   has a past medical history of Abnormal EKG, Anesthesia complication, Atrial fibrillation (HCC), CAD (coronary artery disease), Cancer (HCC), CHF (congestive heart failure) (HCC), CKD (chronic kidney disease), Clinical trial participant, Colon cancer (HCC), COVID-19, Diabetes mellitus (HCC), Dribbling urine, Forgetfulness, Gout, H/O cataract, H/O migraine, H/O prostate cancer, Hematuria, HTN (hypertension), Hyperlipidemia, Kidney stones, MGUS (monoclonal gammopathy of unknown significance), SUKUMAR (obstructive sleep apnea), Parkinson disease (HCC), Renal insufficiency, Supplemental oxygen dependent, Under care of team, Under care of  swelling,and bone pain   Neurological: negative for headaches, dizziness, seizures, weakness, numbness    PHYSICAL EXAM:      /76   Pulse 62   Temp 97.7 °F (36.5 °C) (Oral)   Resp 16   Ht 1.727 m (5' 8\")   Wt 82.6 kg (182 lb)   SpO2 99%   BMI 27.67 kg/m²    Temp (24hrs), Av.2 °F (36.8 °C), Min:97.7 °F (36.5 °C), Max:98.6 °F (37 °C)    General appearance -ill appearing, no in pain or distress   Mental status - alert and cooperative   Eyes - pupils equal and reactive, extraocular eye movements intact   Ears - bilateral TM's and external ear canals normal   Mouth - mucous membranes moist, pharynx normal without lesions   Neck - supple, no significant adenopathy   Lymphatics - no palpable lymphadenopathy, no hepatosplenomegaly   Chest - clear to auscultation, no wheezes, rales or rhonchi, symmetric air entry   Heart - normal rate, regular rhythm, normal S1, S2, no murmurs  Abdomen - soft, nontender, nondistended, no masses or organomegaly   Neurological - alert, oriented, normal speech, no focal findings or movement disorder noted   Musculoskeletal - no joint tenderness, deformity or swelling   Extremities - peripheral pulses normal, no pedal edema, no clubbing or cyanosis   Skin - normal coloration and turgor, no rashes, no suspicious skin lesions noted ,    DATA:    Labs:   CBC:   Recent Labs     25  0528 25  1051 25  0517   WBC 9.6  --  6.5   HGB 6.8* 7.2* 7.8*   HCT 19.6* 20.7* 22.7*     --  182     BMP:   Recent Labs     25  0528 25  0517    136   K 3.5* 3.6*   CO2 24 26   BUN 35* 37*   CREATININE 1.8* 1.6*   LABGLOM 36* 40*   GLUCOSE 110 107     PT/INR:   No results for input(s): \"PROTIME\", \"INR\" in the last 72 hours.      IMAGING DATA:      Primary Problem  Acute on chronic diastolic (congestive) heart failure (HCC)    Active Hospital Problems    Diagnosis Date Noted    Acute hypoxic respiratory failure (HCC) [J96.01] 2025    Large granular

## 2025-06-06 NOTE — CARE COORDINATION
Discharge planning    Spoke with patient and daughter MERNA and patient will [facility] home care thru [facility].     Will send home care order in [facility] once received.     IMM letter provided to patient.  Patient offered four hours to make informed decision regarding appeal process; patient agreeable to discharge.

## 2025-06-06 NOTE — PROGRESS NOTES
Section of Cardiology  Progress Note      Date:  6/6/2025  Patient: Duane Adamczak  Admission:  6/2/2025  9:24 PM  Admit DX: Pneumonia due to infectious organism, unspecified laterality, unspecified part of lung [J18.9]  Acute hypoxic respiratory failure (HCC) [J96.01]  Age:  89 y.o., 1935     LOS: 3 days     Reason for evaluation:   [id number]      SUBJECTIVE:     The patient was seen and examined. Notes and labs reviewed.  Pt off O2 entirely  BP and HR stable  Going home today  Denies any CP, SOB, palpitations, edema  Overall happy and excited to be leaving  Eating and drinking well  Worked with PT and OT earlier without issues      OBJECTIVE:      EXAM:   Vitals:    VITALS:  /67   Pulse 66   Temp 97.7 °F (36.5 °C) (Oral)   Resp 18   Ht 1.727 m (5' 8\")   Wt 82.6 kg (182 lb)   SpO2 95%   BMI 27.67 kg/m²   24HR INTAKE/OUTPUT:    Intake/Output Summary (Last 24 hours) at 6/6/2025 1434  Last data filed at 6/6/2025 0935  Gross per 24 hour   Intake --   Output 1350 ml   Net -1350 ml       CONSTITUTIONAL:  awake, alert, cooperative, no apparent distress, and appears stated age.  HEENT: Normal jugular venous pulsations, no carotid bruits. Head is atraumatic, normocephalic. Eyes and oral mucosa are normal.  LUNGS: Fine basal crackles  CARDIOVASCULAR:  Normal apical impulse, regular rate and rhythm, normal S1 and S2, no S3 or S4, and no murmur or rub noted.  ABDOMEN: Soft, nontender, nondistended. Bowel sounds present. No masses or tenderness. No bruit.  SKIN: Warm and dry.  EXTREMITIES:No lower extremity edema.     Current Inpatient Medications:   gentamicin  1 drop Right Eye 6 times per day    furosemide  40 mg Oral Daily    epoetin stefano-epbx  10,000 Units SubCUTAneous Once per day on Monday Wednesday Friday    cefepime  2,000 mg IntraVENous Q12H    ascorbic acid  1,000 mg Oral Daily    empagliflozin  10 mg Oral Daily    folic acid  1 mg Oral Daily    rivastigmine  6 mg Oral BID    sertraline  25 mg  PANEL:  Lab Results   Component Value Date/Time    HDL 26 11/22/2023 05:23 AM    TRIG 94 11/22/2023 05:23 AM     LIVER PROFILE:No results for input(s): \"AST\", \"ALT\", \"LABALBU\", \"ALKPHOS\", \"BILITOT\", \"BILIDIR\", \"IBILI\", \"GLOB\", \"ALBUMIN\" in the last 72 hours.    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  Cont PO lasix on dischage  Restart eliquis and other home meds  FU with Dr Logan in next 2 weeks (already has appointment) to determine meds and/or OP testing  ECHO reassuring  OK for discharge today      Discussed with patient and family    Ant Liang MD

## 2025-06-06 NOTE — DISCHARGE INSTR - COC
Continuity of Care Form    Patient Name: Duane Adamczak   :  1935  MRN:  [id number]    Admit date:  2025  Discharge date:  6/3/2025    Code Status Order: Full Code   Advance Directives:     Admitting Physician:  Clara Can MD  PCP: Toña Yi MD    Discharging Nurse: Cele BRANDT   Discharging Hospital Unit/Room#: 1007/1007-02  Discharging Unit Phone Number: [phone]    Emergency Contact:   Extended Emergency Contact Information  Primary Emergency Contact: Preeti Aponte  Home Phone: [phone]  Mobile Phone: [phone]  Relation: Child  Preferred language: English   needed? No  Secondary Emergency Contact: Drew Marr  Home Phone: [phone]  Mobile Phone: [phone]  Relation: Son-in-Law  Preferred language: English   needed? No    Past Surgical History:  Past Surgical History:   Procedure Laterality Date    APPENDECTOMY      BLADDER SURGERY      SPHINTER CUFF INSERT    CARDIOVERSION N/A 2019    Dr. Cota  cardioverted a fib to RSR    CHOLECYSTECTOMY      COLONOSCOPY      CT BIOPSY BONE MARROW  2025    CT BIOPSY BONE MARROW 2025 [facility] CT SCAN    ESOPHAGOGASTRODUODENOSCOPY  2023    LAPAROSCOPIC LEFT COLON RESECTION      LITHOTRIPSY      PROCTOSIGMOIDOSCOPY N/A 2023    FLEXIBLE SIGMOIDOSCOPY performed by Harinder Germain MD at [facility] OR    PROSTATECTOMY      ROTATOR CUFF REPAIR Left     SKIN BIOPSY      TOTAL KNEE ARTHROPLASTY      RIGHT AND LEFT knees    TRANSESOPHAGEAL ECHOCARDIOGRAM N/A 2019    Dr. Cota    UPPER GASTROINTESTINAL ENDOSCOPY N/A 2023    ENDOSCOPIC ULTRASOUND WITH PATHOLOGY performed by Harinder Germain MD at [facility] OR    UPPER GASTROINTESTINAL ENDOSCOPY  2023    EGD BIOPSY performed by Harinder Germain MD at [facility] OR       Immunization History:   Immunization History   Administered Date(s) Administered    COVID-19, PFIZER PURPLE top, DILUTE for use, (age 12 y+), 30mcg/0.3mL 2021, 2021,  11/21/2021       Active Problems:  Patient Active Problem List   Diagnosis Code    Alzheimer's disease, unspecified (HCC) G30.9, F02.80    Atrial fibrillation (HCC) I48.91    Cardiomyopathy, unspecified (HCC) I42.9    Diabetes mellitus (HCC) E11.9    Dyslipidemia E78.5    Essential hypertension I10    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    Parkinson's disease (HCC) G20.A1    Stage 3 chronic kidney disease (HCC) N18.30    Acute on chronic diastolic (congestive) heart failure (HCC) I50.33    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    Acute hypoxic respiratory failure (HCC) J96.01    Large granular lymphocytic leukemia (HCC) C91.Z0    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, T45.1X5A       Isolation/Infection:   Isolation            No Isolation          Patient Infection Status    None to display              Nurse Assessment:  Last Vital Signs: /67   Pulse 66   Temp 97.7 °F (36.5 °C) (Oral)   Resp 18   Ht 1.727 m (5' 8\")   Wt 82.6 kg (182 lb)   SpO2 95%   BMI 27.67 kg/m²     Last documented pain score (0-10 scale):    Last Weight:   Wt Readings from Last 1 Encounters:   06/03/25 82.6 kg (182 lb)     Mental Status:  alert    IV Access:  - None    Nursing Mobility/ADLs:  Walking   Assisted  Transfer  Assisted  Bathing  Assisted  Dressing  Assisted  Toileting  Assisted  Feeding  Assisted  Med Admin  Assisted  Med Delivery   whole    Wound Care Documentation and Therapy:        Elimination:  Continence:   Bowel: Yes  Bladder: No  Urinary Catheter: None   Colostomy/Ileostomy/Ileal Conduit: No       Date of Last BM: 6/6/25    Intake/Output Summary (Last 24 hours) at 6/6/2025 1247  Last data filed at 6/6/2025 0935  Gross per 24 hour   Intake --   Output 1350 ml   Net -1350 ml     I/O last 3 completed shifts:  In: -   Out: 2600 [Urine:2600]    Safety Concerns:     Sundowners [place]rome and At Risk for Falls    Impairments/Disabilities:      Dementia     Nutrition Therapy:  Current Nutrition Therapy:   Primary Diet:

## 2025-06-06 NOTE — CARE COORDINATION
AND there is a normal inability to leave home, AND leaving home requires considerable taxing effort  Patient requires the following home health services: Skilled Nursing  Patient requires the following home health services: Physical Therapy  Patient requires the following home health services: Occupational Therapy Priority  Routine Class  Hospital Performed      Order Status    Expected Date    Specimen Source    Collection Date    Collection Time    Occurrences Remaining    Interval  ONE TIME      Electronically Signed By  Joaquim Conteh DO  NPI:  [id number] Date  Jun 6, 2025  12:56 PM               Responsible Party /Guarantor Information     Guar-ActID   Relationship Account Type Home Phone   ADAMCZAK,DUANE - [phone]* [phone] / [place]* Self P/F [phone]   Employer   Work Phone   RETIRED                  Insurance & Policy [place] Information         Primary Insurance:  Insurance/Subscriber ID:  [id number]  Subscriber Name:  ADAMCZAK,DUANE              Relationship to Patient: Self     Secondary Insurance:  Insurance/Subscriber ID: [id number]  Subscriber Name: ADAMCZAK,DUANE  Relationship to Patient: Self     Signed ABN: N       Payor Name:  MEDICARE   Plan:  MEDICARE PART A AND B   Group:         Payor Name: [employer]  Plan:  [facility] MEDICARE SUPP     Worker's Comp Date of Injury:

## 2025-06-06 NOTE — PLAN OF CARE
Problem: Chronic Conditions and Co-morbidities  Goal: Patient's chronic conditions and co-morbidity symptoms are monitored and maintained or improved  6/6/2025 1120 by Yazmin Vaughan RN  Outcome: Progressing  6/6/2025 1119 by Yazmin Vaughan RN  Outcome: Progressing  6/6/2025 0140 by Nella Amezcua RN  Outcome: Progressing  Flowsheets (Taken 6/5/2025 2100)  Care Plan - Patient's Chronic Conditions and Co-Morbidity Symptoms are Monitored and Maintained or Improved: Monitor and assess patient's chronic conditions and comorbid symptoms for stability, deterioration, or improvement     Problem: Discharge Planning  Goal: Discharge to home or other facility with appropriate resources  6/6/2025 1120 by Yazmin Vaughan RN  Outcome: Progressing  6/6/2025 1119 by Yazmin Vaughan RN  Outcome: Progressing  6/6/2025 0140 by Nella Amezcua RN  Outcome: Progressing  Flowsheets (Taken 6/5/2025 2100)  Discharge to home or other facility with appropriate resources: Identify barriers to discharge with patient and caregiver     Problem: Safety - Adult  Goal: Free from fall injury  6/6/2025 1120 by Yzamin Vaughan RN  Outcome: Progressing  6/6/2025 1119 by Yazmin Vaughan RN  Outcome: Progressing  6/6/2025 0140 by Nella Amezcua RN  Outcome: Progressing     Problem: Skin/Tissue Integrity  Goal: Skin integrity remains intact  Description: 1.  Monitor for areas of redness and/or skin breakdown2.  Assess vascular access sites hourly3.  Every 4-6 hours minimum:  Change oxygen saturation probe site4.  Every 4-6 hours:  If on nasal continuous positive airway pressure, respiratory therapy assess nares and determine need for appliance change or resting period  6/6/2025 1120 by Yazmin Vaughan RN  Outcome: Progressing  6/6/2025 1119 by Yazmin Vaughan RN  Outcome: Progressing  Flowsheets (Taken 6/6/2025 0930)  Skin Integrity Remains Intact: Monitor for areas of redness and/or skin breakdown  6/6/2025 0140 by Nella Amezcua

## 2025-06-06 NOTE — PROGRESS NOTES
Occupational Therapy  Occupational Therapy Daily Treatment Note  Facility/Department: [facility]   Patient Name: Duane Adamczak        MRN: [id number]    : 1935    Date of Service: 2025    Discharge Recommendations  Discharge Recommendations: Patient would benefit from continued therapy after discharge, 24 hour supervision or assist  OT Equipment Recommendations  Other: Pt currently functioning below baseline.  Recommend daily inpatient skilled therapy at time of discharge to maximize long term outcomes and prevent re-admission. Please refer to AM-PAC score for current level of function.    Chief Complaint   Patient presents with    Shortness of Breath     Onset this afternoon     Past Medical History:  has a past medical history of Abnormal EKG, Anesthesia complication, Atrial fibrillation (HCC), CAD (coronary artery disease), Cancer (HCC), CHF (congestive heart failure) (HCC), CKD (chronic kidney disease), Clinical trial participant, Colon cancer (HCC), COVID-19, Diabetes mellitus (HCC), Dribbling urine, Forgetfulness, Gout, H/O cataract, H/O migraine, H/O prostate cancer, Hematuria, HTN (hypertension), Hyperlipidemia, Kidney stones, MGUS (monoclonal gammopathy of unknown significance), SUKUMAR (obstructive sleep apnea), Parkinson disease (HCC), Renal insufficiency, Supplemental oxygen dependent, Under care of team, Under care of team, Vasovagal episode, Wears glasses, Wears partial dentures, and Wellness examination.  Past Surgical History:  has a past surgical history that includes transesophageal echocardiogram (N/A, 2019); Lithotripsy; Prostatectomy; Bladder surgery; Cholecystectomy; Total knee arthroplasty; Appendectomy; Rotator cuff repair (Left); Cardioversion (N/A, 2019); Colonoscopy; Laparoscopic left colon resection; Esophagogastroduodenoscopy (2023); Upper gastrointestinal endoscopy (N/A, 2023); proctosigmoidoscopy (N/A, 2023); Upper gastrointestinal  endoscopy (04/19/2023); skin biopsy; and CT BIOPSY BONE MARROW (4/25/2025).    Assessment  Performance deficits / Impairments: Decreased functional mobility ;Decreased ADL status;Decreased strength;Decreased endurance;Decreased safe awareness;Decreased balance;Decreased posture;Decreased cognition  Assessment: Pt continues to present with deficits in ADLs, strength, bed mobility, transfers,  balance, safety awareness and endurance this session. Pt does continue to show progress with activity tolerance and being able to walk in room distances. Continue to recommend pt have someone with him when up d/t high fall risk and daughter is present to verb understanding of concerns while also being realistic about comprehensive situation with pt and his wife/living situation.  With current deficits, Pt will benefit from continued OT while in hosptial to maximize independence with ADLs functional mobility, balance, safety awareness & activity tolerance. Pt benefits from reinforcement of instructions and will continue to need this regardless of d/c disposition.  Prognosis: Good  Decision Making: Medium Complexity  REQUIRES OT FOLLOW-UP: Yes  Activity Tolerance  Activity Tolerance: Patient Tolerated treatment well;Treatment limited secondary to decreased cognition  Safety Devices  Type of Devices: All fall risk precautions in place;Call light within reach;Gait belt;Left in chair;Nurse notified;Chair alarm in place  Restraints  Restraints Initially in Place: No    Restrictions/Precautions  Restrictions/Precautions  Restrictions/Precautions: Fall Risk;General Precautions;Bed Alarm  Position Activity Restriction  Other Position/Activity Restrictions: continuous pulse oxikm       Subjective  General  Chart Reviewed: Yes  Patient assessed for rehabilitation services?: Yes  Additional Pertinent Hx: leukemia, Parkinson's , CHF, COPD  Family / Caregiver Present: No  Subjective  Subjective: pt emotional this date. Remains very

## 2025-06-06 NOTE — CARE COORDINATION
Discharge planning    Patient from [facility]. He has RW. Has 02 at home but family states only at night. Company unknown. Called [facility] and they do not have patient.     [facility] vs return with home care through      Oncology rounded and requested appt be made for hem/onc to see next week to be set up with infusion center for transfusions as needed. Aranesp for his anemia.     Will need appt with dr sinha set up prior to discharge     Patient has been transitioned to oral lasix and currently on 1 L at 98%. Will need home 02 evaluation     1054  Unable to get through to dr pride office and VM left with request to call writer to set up an appt.

## 2025-06-06 NOTE — PLAN OF CARE
Problem: Chronic Conditions and Co-morbidities  Goal: Patient's chronic conditions and co-morbidity symptoms are monitored and maintained or improved  6/6/2025 1346 by Yazmin Vaughan RN  Outcome: Adequate for Discharge  6/6/2025 1120 by Yazmin Vaughan RN  Outcome: Progressing  6/6/2025 1119 by Yazmin Vaughan RN  Outcome: Progressing  6/6/2025 0140 by Nella Amezcua RN  Outcome: Progressing  Flowsheets (Taken 6/5/2025 2100)  Care Plan - Patient's Chronic Conditions and Co-Morbidity Symptoms are Monitored and Maintained or Improved: Monitor and assess patient's chronic conditions and comorbid symptoms for stability, deterioration, or improvement     Problem: Discharge Planning  Goal: Discharge to home or other facility with appropriate resources  6/6/2025 1346 by Yazmin Vaughan RN  Outcome: Adequate for Discharge  6/6/2025 1120 by Yazmin Vaughan RN  Outcome: Progressing  6/6/2025 1119 by Yazmin Vaughan RN  Outcome: Progressing  6/6/2025 0140 by Nella Amezcua RN  Outcome: Progressing  Flowsheets (Taken 6/5/2025 2100)  Discharge to home or other facility with appropriate resources: Identify barriers to discharge with patient and caregiver     Problem: Safety - Adult  Goal: Free from fall injury  6/6/2025 1346 by Yazmin Vaughan RN  Outcome: Adequate for Discharge  6/6/2025 1120 by Yazmin Vaughan RN  Outcome: Progressing  6/6/2025 1119 by Yazmin Vaughan RN  Outcome: Progressing  6/6/2025 0140 by Nella Amezcua RN  Outcome: Progressing     Problem: Skin/Tissue Integrity  Goal: Skin integrity remains intact  Description: 1.  Monitor for areas of redness and/or skin breakdown2.  Assess vascular access sites hourly3.  Every 4-6 hours minimum:  Change oxygen saturation probe site4.  Every 4-6 hours:  If on nasal continuous positive airway pressure, respiratory therapy assess nares and determine need for appliance change or resting period  6/6/2025 1346 by Yazmin Vaughan RN  Outcome: Adequate for  RN  Outcome: Progressing  Flowsheets (Taken 6/5/2025 2100)  Maintains hematologic stability: Assess for signs and symptoms of bleeding or hemorrhage     Problem: Respiratory - Adult  Goal: Achieves optimal ventilation and oxygenation  6/6/2025 1346 by Yazmin Vaughan RN  Outcome: Adequate for Discharge  6/6/2025 1120 by Yazmin Vaughan RN  Outcome: Progressing  6/6/2025 1119 by Yazmin Vaughan RN  Outcome: Progressing  6/6/2025 0140 by Nella Amezcua RN  Outcome: Progressing  Flowsheets (Taken 6/5/2025 2100)  Achieves optimal ventilation and oxygenation: Assess for changes in respiratory status

## 2025-06-06 NOTE — PROGRESS NOTES
CLINICAL PHARMACY NOTE: MEDS TO BEDS    Total # of Prescriptions Filled: 2   The following medications were delivered to the patient:  Furosemide 40 mg tabs  Gentamicin 0.3% eye drops    Additional Documentation:

## 2025-06-08 LAB
MICROORGANISM SPEC CULT: NORMAL
MICROORGANISM SPEC CULT: NORMAL
SERVICE CMNT-IMP: NORMAL
SERVICE CMNT-IMP: NORMAL
SPECIMEN DESCRIPTION: NORMAL
SPECIMEN DESCRIPTION: NORMAL

## 2025-06-13 ENCOUNTER — HOSPITAL ENCOUNTER (OUTPATIENT)
Age: 89
Setting detail: SPECIMEN
Discharge: HOME | End: 2025-06-13
Payer: MEDICARE

## 2025-06-13 ENCOUNTER — HOSPITAL ENCOUNTER (OUTPATIENT)
Dept: INFUSION THERAPY | Facility: MEDICAL CENTER | Age: 89
Discharge: HOME | End: 2025-06-13
Payer: MEDICARE

## 2025-06-13 VITALS
DIASTOLIC BLOOD PRESSURE: 55 MMHG | SYSTOLIC BLOOD PRESSURE: 115 MMHG | TEMPERATURE: 97.5 F | RESPIRATION RATE: 18 BRPM | BODY MASS INDEX: 26.9 KG/M2 | HEART RATE: 63 BPM | WEIGHT: 176.9 LBS

## 2025-06-13 DIAGNOSIS — C91.Z0 T-CELL LARGE GRANULAR LYMPHOCYTIC LEUKEMIA (HCC): ICD-10-CM

## 2025-06-13 DIAGNOSIS — D53.9 MACROCYTIC ANEMIA: Primary | ICD-10-CM

## 2025-06-13 DIAGNOSIS — C91.Z0 T-CELL LARGE GRANULAR LYMPHOCYTIC LEUKEMIA (HCC): Primary | ICD-10-CM

## 2025-06-13 LAB
BASOPHILS # BLD: 0.08 K/UL (ref 0–0.2)
BASOPHILS NFR BLD: 1 %
EOSINOPHIL # BLD: 0 K/UL (ref 0–0.4)
EOSINOPHILS RELATIVE PERCENT: 0 % (ref 1–4)
ERYTHROCYTE [DISTWIDTH] IN BLOOD BY AUTOMATED COUNT: 18.6 % (ref 11.8–14.4)
HCT VFR BLD AUTO: 24.3 % (ref 40.7–50.3)
HGB BLD-MCNC: 8.1 G/DL (ref 13–17)
IMM GRANULOCYTES # BLD AUTO: 0 K/UL (ref 0–0.3)
IMM GRANULOCYTES NFR BLD: 0 %
LYMPHOCYTES NFR BLD: 7.23 K/UL (ref 1–4.8)
LYMPHOCYTES RELATIVE PERCENT: 86 % (ref 24–44)
MCH RBC QN AUTO: 34.6 PG (ref 25.2–33.5)
MCHC RBC AUTO-ENTMCNC: 33.3 G/DL (ref 28.4–34.8)
MCV RBC AUTO: 103.8 FL (ref 82.6–102.9)
MONOCYTES NFR BLD: 0.17 K/UL (ref 0.2–0.8)
MONOCYTES NFR BLD: 2 % (ref 1–7)
MORPHOLOGY: ABNORMAL
NEUTROPHILS NFR BLD: 11 % (ref 36–66)
NEUTS SEG NFR BLD: 0.92 K/UL (ref 1.8–7.7)
NRBC BLD-RTO: 0 PER 100 WBC
PLATELET # BLD AUTO: 352 K/UL (ref 138–453)
PMV BLD AUTO: 9.2 FL (ref 8.1–13.5)
RBC # BLD AUTO: 2.34 M/UL (ref 4.21–5.77)
WBC OTHER # BLD: 8.4 K/UL (ref 3.5–11.3)

## 2025-06-13 PROCEDURE — 6360000002 HC RX W HCPCS: Performed by: INTERNAL MEDICINE

## 2025-06-13 PROCEDURE — 85025 COMPLETE CBC W/AUTO DIFF WBC: CPT

## 2025-06-13 PROCEDURE — 96372 THER/PROPH/DIAG INJ SC/IM: CPT

## 2025-06-13 PROCEDURE — 36415 COLL VENOUS BLD VENIPUNCTURE: CPT

## 2025-06-13 RX ORDER — ACETAMINOPHEN 325 MG/1
650 TABLET ORAL
OUTPATIENT
Start: 2025-06-13

## 2025-06-13 RX ORDER — EPINEPHRINE 1 MG/ML
0.3 INJECTION, SOLUTION INTRAMUSCULAR; SUBCUTANEOUS PRN
OUTPATIENT
Start: 2025-06-13

## 2025-06-13 RX ORDER — FAMOTIDINE 10 MG/ML
20 INJECTION, SOLUTION INTRAVENOUS
OUTPATIENT
Start: 2025-06-13

## 2025-06-13 RX ORDER — ONDANSETRON 2 MG/ML
8 INJECTION INTRAMUSCULAR; INTRAVENOUS
OUTPATIENT
Start: 2025-06-13

## 2025-06-13 RX ORDER — SODIUM CHLORIDE 9 MG/ML
INJECTION, SOLUTION INTRAVENOUS CONTINUOUS
OUTPATIENT
Start: 2025-06-13

## 2025-06-13 RX ORDER — ALBUTEROL SULFATE 90 UG/1
4 INHALANT RESPIRATORY (INHALATION) PRN
OUTPATIENT
Start: 2025-06-13

## 2025-06-13 RX ORDER — DIPHENHYDRAMINE HYDROCHLORIDE 50 MG/ML
50 INJECTION, SOLUTION INTRAMUSCULAR; INTRAVENOUS
OUTPATIENT
Start: 2025-06-13

## 2025-06-13 RX ORDER — HYDROCORTISONE SODIUM SUCCINATE 100 MG/2ML
100 INJECTION INTRAMUSCULAR; INTRAVENOUS
OUTPATIENT
Start: 2025-06-13

## 2025-06-13 RX ADMIN — DARBEPOETIN ALFA 200 MCG: 200 INJECTION, SOLUTION INTRAVENOUS; SUBCUTANEOUS at 11:50

## 2025-06-13 NOTE — PROGRESS NOTES
Patient arrives ambulatory for first aranesp injection.  Denies new concerns/complaints.  Vitals as charted.  Labs reviewed, hgb 8.1.  [facility] education provided to patient.  Aranesp given with no issues; band aide applied.  Patient discharged with calendar in hand.

## 2025-06-17 ENCOUNTER — HOSPITAL ENCOUNTER (OUTPATIENT)
Facility: MEDICAL CENTER | Age: 89
End: 2025-06-17

## 2025-06-18 ENCOUNTER — TELEPHONE (OUTPATIENT)
Age: 89
End: 2025-06-18

## 2025-06-18 ENCOUNTER — OFFICE VISIT (OUTPATIENT)
Age: 89
End: 2025-06-18
Payer: MEDICARE

## 2025-06-18 VITALS
SYSTOLIC BLOOD PRESSURE: 131 MMHG | RESPIRATION RATE: 18 BRPM | HEART RATE: 67 BPM | DIASTOLIC BLOOD PRESSURE: 62 MMHG | TEMPERATURE: 98 F | BODY MASS INDEX: 27.05 KG/M2 | WEIGHT: 177.9 LBS

## 2025-06-18 DIAGNOSIS — Z09 HOSPITAL DISCHARGE FOLLOW-UP: ICD-10-CM

## 2025-06-18 DIAGNOSIS — C91.Z0 LARGE GRANULAR LYMPHOCYTIC LEUKEMIA (HCC): Primary | ICD-10-CM

## 2025-06-18 PROCEDURE — 99213 OFFICE O/P EST LOW 20 MIN: CPT | Performed by: INTERNAL MEDICINE

## 2025-06-18 NOTE — PROGRESS NOTES
LABORATORIES   Value: A ZONE OF RESTRICTION IS PRESENT IN THE GAMMAGLOBULIN REGION.  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     PATHOLOGY DATA:   Reviewed   IMAGING DATA:      Echo (TTE) complete (PRN contrast/bubble/strain/3D)    Left Ventricle: Low normal left ventricular systolic function with a   visually estimated EF of 50 - 55%. EF by visual approximation is 50%. Left   ventricle size is normal. Mildly increased wall thickness. Moderate basal   septal thickening. Normal wall motion. Grade III diastolic dysfunction   with increased LAP.    Right Ventricle: Right ventricle size is normal. Normal systolic   function.    Aortic Valve: Mildly calcified cusps. Mild stenosis of the aortic   valve. AV mean gradient is 17 mmHg. AV Peak Gradient is 27 mmHg. AV Mean   Gradient is 17 mmHg.    Mitral Valve: Moderate to severe regurgitation.    Tricuspid Valve: Moderate regurgitation. Moderately elevated RVSP,   consistent with moderate pulmonary hypertension.    Left Atrium: Left atrium is severely dilated. Left atrial volume index   is severely increased (>48 mL/m2) mL/m2.    Image quality is adequate.    ASSESSMENT:      Duane Adamczak is a 89 y.o. 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  I reviewed the laboratory, imaging studies, prior records, discussed diagnosis, prognosis and treatment recommendations.    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 I reviewed the diagnosis and treatment recommendations. Given his cytopenia he may need treatment and I will start on low dose methotrexate 10 mg weekly.   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

## 2025-06-18 NOTE — TELEPHONE ENCOUNTER
DUANE HERE FOR MD VISIT  Change aranesp to every 2 weeks  Next aranesp 6/27  RTc 6 weeks  MD VISIT 7/18/25 @ 9:30AM  AVS PRINTED W/ INSTRUCTIONS AND GIVEN TO PT ON EXIT

## 2025-06-27 ENCOUNTER — HOSPITAL ENCOUNTER (OUTPATIENT)
Age: 89
Setting detail: SPECIMEN
Discharge: HOME | End: 2025-06-27
Payer: MEDICARE

## 2025-06-27 ENCOUNTER — HOSPITAL ENCOUNTER (OUTPATIENT)
Dept: INFUSION THERAPY | Facility: MEDICAL CENTER | Age: 89
Discharge: HOME | End: 2025-06-27
Payer: MEDICARE

## 2025-06-27 VITALS
SYSTOLIC BLOOD PRESSURE: 120 MMHG | DIASTOLIC BLOOD PRESSURE: 57 MMHG | OXYGEN SATURATION: 95 % | RESPIRATION RATE: 18 BRPM | HEART RATE: 61 BPM | TEMPERATURE: 98.1 F

## 2025-06-27 DIAGNOSIS — D53.9 MACROCYTIC ANEMIA: Primary | ICD-10-CM

## 2025-06-27 DIAGNOSIS — D64.9 ANEMIA, UNSPECIFIED TYPE: ICD-10-CM

## 2025-06-27 DIAGNOSIS — D64.9 ANEMIA, UNSPECIFIED TYPE: Primary | ICD-10-CM

## 2025-06-27 LAB
BASOPHILS # BLD: 0.1 K/UL (ref 0–0.2)
BASOPHILS NFR BLD: 1 %
EOSINOPHIL # BLD: 0.1 K/UL (ref 0–0.4)
EOSINOPHILS RELATIVE PERCENT: 1 % (ref 1–4)
ERYTHROCYTE [DISTWIDTH] IN BLOOD BY AUTOMATED COUNT: 20.9 % (ref 11.8–14.4)
HCT VFR BLD AUTO: 23.6 % (ref 40.7–50.3)
HGB BLD-MCNC: 7.8 G/DL (ref 13–17)
IMM GRANULOCYTES # BLD AUTO: 0 K/UL (ref 0–0.3)
IMM GRANULOCYTES NFR BLD: 0 %
LYMPHOCYTES NFR BLD: 7.78 K/UL (ref 1–4.8)
LYMPHOCYTES RELATIVE PERCENT: 82 % (ref 24–44)
MCH RBC QN AUTO: 35.6 PG (ref 25.2–33.5)
MCHC RBC AUTO-ENTMCNC: 33.1 G/DL (ref 28.4–34.8)
MCV RBC AUTO: 107.8 FL (ref 82.6–102.9)
MONOCYTES NFR BLD: 0.57 K/UL (ref 0.2–0.8)
MONOCYTES NFR BLD: 6 % (ref 1–7)
MORPHOLOGY: ABNORMAL
NEUTROPHILS NFR BLD: 10 % (ref 36–66)
NEUTS SEG NFR BLD: 0.95 K/UL (ref 1.8–7.7)
NRBC BLD-RTO: 0 PER 100 WBC
PLATELET # BLD AUTO: 285 K/UL (ref 138–453)
PMV BLD AUTO: 9.5 FL (ref 8.1–13.5)
RBC # BLD AUTO: 2.19 M/UL (ref 4.21–5.77)
WBC OTHER # BLD: 9.5 K/UL (ref 3.5–11.3)

## 2025-06-27 PROCEDURE — 85025 COMPLETE CBC W/AUTO DIFF WBC: CPT

## 2025-06-27 PROCEDURE — 96372 THER/PROPH/DIAG INJ SC/IM: CPT

## 2025-06-27 PROCEDURE — 6360000002 HC RX W HCPCS: Performed by: INTERNAL MEDICINE

## 2025-06-27 PROCEDURE — 36415 COLL VENOUS BLD VENIPUNCTURE: CPT

## 2025-06-27 RX ORDER — ACETAMINOPHEN 325 MG/1
650 TABLET ORAL
OUTPATIENT
Start: 2025-07-11

## 2025-06-27 RX ORDER — EPINEPHRINE 1 MG/ML
0.3 INJECTION, SOLUTION INTRAMUSCULAR; SUBCUTANEOUS PRN
OUTPATIENT
Start: 2025-07-11

## 2025-06-27 RX ORDER — SODIUM CHLORIDE 9 MG/ML
INJECTION, SOLUTION INTRAVENOUS CONTINUOUS
OUTPATIENT
Start: 2025-07-11

## 2025-06-27 RX ORDER — DIPHENHYDRAMINE HYDROCHLORIDE 50 MG/ML
50 INJECTION, SOLUTION INTRAMUSCULAR; INTRAVENOUS
OUTPATIENT
Start: 2025-07-11

## 2025-06-27 RX ORDER — HYDROCORTISONE SODIUM SUCCINATE 100 MG/2ML
100 INJECTION INTRAMUSCULAR; INTRAVENOUS
OUTPATIENT
Start: 2025-07-11

## 2025-06-27 RX ORDER — FAMOTIDINE 10 MG/ML
20 INJECTION, SOLUTION INTRAVENOUS
OUTPATIENT
Start: 2025-07-11

## 2025-06-27 RX ORDER — ALBUTEROL SULFATE 90 UG/1
4 INHALANT RESPIRATORY (INHALATION) PRN
OUTPATIENT
Start: 2025-07-11

## 2025-06-27 RX ORDER — ONDANSETRON 2 MG/ML
8 INJECTION INTRAMUSCULAR; INTRAVENOUS
OUTPATIENT
Start: 2025-07-11

## 2025-06-27 RX ADMIN — DARBEPOETIN ALFA 200 MCG: 200 INJECTION, SOLUTION INTRAVENOUS; SUBCUTANEOUS at 09:47

## 2025-06-27 NOTE — PROGRESS NOTES
Patient arrives ambulatory for first aranesp injection.  Denies new concerns/complaints.  Vitals as charted.  Labs reviewed, hgb 7.8  Aranesp given with no issues; band aide applied.  Patient discharged with calendar in hand.

## 2025-07-10 ENCOUNTER — HOSPITAL ENCOUNTER (OUTPATIENT)
Facility: MEDICAL CENTER | Age: 89
End: 2025-07-10

## 2025-07-11 ENCOUNTER — HOSPITAL ENCOUNTER (OUTPATIENT)
Age: 89
Setting detail: SPECIMEN
Discharge: HOME | End: 2025-07-11
Payer: MEDICARE

## 2025-07-11 ENCOUNTER — HOSPITAL ENCOUNTER (OUTPATIENT)
Dept: INFUSION THERAPY | Facility: MEDICAL CENTER | Age: 89
Discharge: HOME | End: 2025-07-11
Payer: MEDICARE

## 2025-07-11 VITALS
BODY MASS INDEX: 26.52 KG/M2 | SYSTOLIC BLOOD PRESSURE: 111 MMHG | DIASTOLIC BLOOD PRESSURE: 71 MMHG | RESPIRATION RATE: 18 BRPM | WEIGHT: 174.4 LBS | TEMPERATURE: 97.4 F | HEART RATE: 70 BPM

## 2025-07-11 DIAGNOSIS — D64.9 ANEMIA, UNSPECIFIED TYPE: ICD-10-CM

## 2025-07-11 DIAGNOSIS — D53.9 MACROCYTIC ANEMIA: Primary | ICD-10-CM

## 2025-07-11 LAB
BASOPHILS # BLD: 0 K/UL (ref 0–0.2)
BASOPHILS NFR BLD: 0 % (ref 0–2)
EOSINOPHIL # BLD: 0.06 K/UL (ref 0–0.44)
EOSINOPHILS RELATIVE PERCENT: 1 % (ref 1–4)
ERYTHROCYTE [DISTWIDTH] IN BLOOD BY AUTOMATED COUNT: 21.5 % (ref 11.8–14.4)
HCT VFR BLD AUTO: 22.9 % (ref 40.7–50.3)
HGB BLD-MCNC: 7.6 G/DL (ref 13–17)
IMM GRANULOCYTES # BLD AUTO: 0 K/UL (ref 0–0.3)
IMM GRANULOCYTES NFR BLD: 0 %
LYMPHOCYTES NFR BLD: 4.98 K/UL (ref 1.1–3.7)
LYMPHOCYTES RELATIVE PERCENT: 83 % (ref 24–43)
MCH RBC QN AUTO: 36 PG (ref 25.2–33.5)
MCHC RBC AUTO-ENTMCNC: 33.2 G/DL (ref 28.4–34.8)
MCV RBC AUTO: 108.5 FL (ref 82.6–102.9)
MONOCYTES NFR BLD: 0.3 K/UL (ref 0.1–1.2)
MONOCYTES NFR BLD: 5 % (ref 3–12)
MORPHOLOGY: ABNORMAL
NEUTROPHILS NFR BLD: 11 % (ref 36–65)
NEUTS SEG NFR BLD: 0.66 K/UL (ref 1.5–8.1)
NRBC BLD-RTO: 0 PER 100 WBC
PLATELET # BLD AUTO: 200 K/UL (ref 138–453)
PMV BLD AUTO: 9.8 FL (ref 8.1–13.5)
RBC # BLD AUTO: 2.11 M/UL (ref 4.21–5.77)
WBC OTHER # BLD: 6 K/UL (ref 3.5–11.3)

## 2025-07-11 PROCEDURE — 85025 COMPLETE CBC W/AUTO DIFF WBC: CPT

## 2025-07-11 PROCEDURE — 96372 THER/PROPH/DIAG INJ SC/IM: CPT

## 2025-07-11 PROCEDURE — 36415 COLL VENOUS BLD VENIPUNCTURE: CPT

## 2025-07-11 PROCEDURE — 6360000002 HC RX W HCPCS: Performed by: INTERNAL MEDICINE

## 2025-07-11 RX ORDER — DIPHENHYDRAMINE HYDROCHLORIDE 50 MG/ML
50 INJECTION, SOLUTION INTRAMUSCULAR; INTRAVENOUS
OUTPATIENT
Start: 2025-07-25

## 2025-07-11 RX ORDER — SODIUM CHLORIDE 9 MG/ML
INJECTION, SOLUTION INTRAVENOUS CONTINUOUS
OUTPATIENT
Start: 2025-07-25

## 2025-07-11 RX ORDER — ALBUTEROL SULFATE 90 UG/1
4 INHALANT RESPIRATORY (INHALATION) PRN
OUTPATIENT
Start: 2025-07-25

## 2025-07-11 RX ORDER — EPINEPHRINE 1 MG/ML
0.3 INJECTION, SOLUTION INTRAMUSCULAR; SUBCUTANEOUS PRN
OUTPATIENT
Start: 2025-07-25

## 2025-07-11 RX ORDER — ACETAMINOPHEN 325 MG/1
650 TABLET ORAL
OUTPATIENT
Start: 2025-07-25

## 2025-07-11 RX ORDER — FAMOTIDINE 10 MG/ML
20 INJECTION, SOLUTION INTRAVENOUS
OUTPATIENT
Start: 2025-07-25

## 2025-07-11 RX ORDER — ONDANSETRON 2 MG/ML
8 INJECTION INTRAMUSCULAR; INTRAVENOUS
OUTPATIENT
Start: 2025-07-25

## 2025-07-11 RX ORDER — HYDROCORTISONE SODIUM SUCCINATE 100 MG/2ML
100 INJECTION INTRAMUSCULAR; INTRAVENOUS
OUTPATIENT
Start: 2025-07-25

## 2025-07-11 RX ADMIN — DARBEPOETIN ALFA 200 MCG: 200 INJECTION, SOLUTION INTRAVENOUS; SUBCUTANEOUS at 08:36

## 2025-07-11 NOTE — PROGRESS NOTES
Patient presents ambulatory with walker for Aranesp injection. Accompanied by daughter. VS as charted. No changes to allergies or medications per patient. Hgb 7.6 today.     Patient states he experienced a short run of A-fib since last injection while at the store which patient has a history of prior. Today he has no complaints and states he feels well. Pharmacy aware, will have patient monitor if any recurring issue between this injection and next.     Aranesp administered to left arm. Pt tolerated well with no adverse reaction noted. Band-aid applied. Patient discharged ambulatory with walker, aware of appointment 7/18.

## 2025-07-18 ENCOUNTER — OFFICE VISIT (OUTPATIENT)
Age: 89
End: 2025-07-18
Payer: MEDICARE

## 2025-07-18 ENCOUNTER — TELEPHONE (OUTPATIENT)
Age: 89
End: 2025-07-18

## 2025-07-18 VITALS
DIASTOLIC BLOOD PRESSURE: 59 MMHG | SYSTOLIC BLOOD PRESSURE: 116 MMHG | HEART RATE: 70 BPM | TEMPERATURE: 97 F | RESPIRATION RATE: 16 BRPM | WEIGHT: 178.5 LBS | BODY MASS INDEX: 27.14 KG/M2

## 2025-07-18 DIAGNOSIS — D64.9 ANEMIA, UNSPECIFIED TYPE: ICD-10-CM

## 2025-07-18 DIAGNOSIS — C91.Z0 LARGE GRANULAR LYMPHOCYTIC LEUKEMIA (HCC): Primary | ICD-10-CM

## 2025-07-18 PROCEDURE — 1123F ACP DISCUSS/DSCN MKR DOCD: CPT | Performed by: INTERNAL MEDICINE

## 2025-07-18 PROCEDURE — 1036F TOBACCO NON-USER: CPT | Performed by: INTERNAL MEDICINE

## 2025-07-18 PROCEDURE — G8417 CALC BMI ABV UP PARAM F/U: HCPCS | Performed by: INTERNAL MEDICINE

## 2025-07-18 PROCEDURE — 99213 OFFICE O/P EST LOW 20 MIN: CPT | Performed by: INTERNAL MEDICINE

## 2025-07-18 PROCEDURE — 1126F AMNT PAIN NOTED NONE PRSNT: CPT | Performed by: INTERNAL MEDICINE

## 2025-07-18 PROCEDURE — G8427 DOCREV CUR MEDS BY ELIG CLIN: HCPCS | Performed by: INTERNAL MEDICINE

## 2025-07-18 PROCEDURE — 1159F MED LIST DOCD IN RCRD: CPT | Performed by: INTERNAL MEDICINE

## 2025-07-18 PROCEDURE — 1160F RVW MEDS BY RX/DR IN RCRD: CPT | Performed by: INTERNAL MEDICINE

## 2025-07-18 PROCEDURE — 99214 OFFICE O/P EST MOD 30 MIN: CPT | Performed by: INTERNAL MEDICINE

## 2025-07-18 NOTE — PROGRESS NOTES
care of team     Dr. Syed at [facility] for GI    Vasovagal episode 12/29/2022    [facility] ER and overnight hospitalization. Was advised to f/u w/ cardiologist. Meds were changed.    Wears glasses     Wears partial dentures     upper    Wellness examination     [facility] geriatric clinic Dr. Yi last visit 1/26/2023       Past Surgical History:   Procedure Laterality Date    APPENDECTOMY      BLADDER SURGERY      SPHINTER CUFF INSERT    CARDIOVERSION N/A 04/24/2019    Dr. Cota  cardioverted a fib to RSR    CHOLECYSTECTOMY      COLONOSCOPY      CT BIOPSY BONE MARROW  4/25/2025    CT BIOPSY BONE MARROW 4/25/2025 [facility] CT SCAN    ESOPHAGOGASTRODUODENOSCOPY  04/19/2023    LAPAROSCOPIC LEFT COLON RESECTION      LITHOTRIPSY      PROCTOSIGMOIDOSCOPY N/A 04/19/2023    FLEXIBLE SIGMOIDOSCOPY performed by Harinder Germain MD at [facility] OR    PROSTATECTOMY      ROTATOR CUFF REPAIR Left     SKIN BIOPSY      TOTAL KNEE ARTHROPLASTY      RIGHT AND LEFT knees    TRANSESOPHAGEAL ECHOCARDIOGRAM N/A 04/24/2019    Dr. Cota    UPPER GASTROINTESTINAL ENDOSCOPY N/A 04/19/2023    ENDOSCOPIC ULTRASOUND WITH PATHOLOGY performed by Harinder Germain MD at [facility] OR    UPPER GASTROINTESTINAL ENDOSCOPY  04/19/2023    EGD BIOPSY performed by Harinder Germain MD at [facility] OR       No Known Allergies    Current Outpatient Medications   Medication Sig Dispense Refill    methotrexate (RHEUMATREX) 10 MG chemo tablet Take 1 tablet by mouth once a week 4 tablet 4    furosemide (LASIX) 40 MG tablet Take 1 tablet by mouth daily 60 tablet 3    gentamicin (GARAMYCIN) 0.3 % ophthalmic solution Place 1 drop into the right eye every 4 hours 1 each 0    folic acid (FOLVITE) 1 MG tablet Take 1 tablet by mouth daily 90 tablet 1    vitamin B-12 (CYANOCOBALAMIN) 1000 MCG tablet Take 1 tablet by mouth daily      dapagliflozin (FARXIGA) 5 MG tablet TAKE 1 TABLET BY MOUTH EVERY MORNING 90 tablet 2    amLODIPine (NORVASC) 10 MG tablet Take 1 tablet by mouth

## 2025-07-18 NOTE — TELEPHONE ENCOUNTER
DUANE HERE FOR MD VISIT  Increase the dose of aranesp to 300 mcg  RTC 6-8 weeks  MD VISIT 9/10/25 @ 1:30PM  AVS PRINTED W/ INSTRUCTIONS AND GIVEN TO PT ON EXIT

## 2025-07-25 ENCOUNTER — HOSPITAL ENCOUNTER (OUTPATIENT)
Dept: INFUSION THERAPY | Facility: MEDICAL CENTER | Age: 89
Discharge: HOME | End: 2025-07-25
Payer: MEDICARE

## 2025-07-25 ENCOUNTER — HOSPITAL ENCOUNTER (OUTPATIENT)
Age: 89
Setting detail: SPECIMEN
Discharge: HOME | End: 2025-07-25
Payer: MEDICARE

## 2025-07-25 VITALS
TEMPERATURE: 97.7 F | DIASTOLIC BLOOD PRESSURE: 77 MMHG | SYSTOLIC BLOOD PRESSURE: 131 MMHG | HEART RATE: 68 BPM | OXYGEN SATURATION: 95 % | RESPIRATION RATE: 18 BRPM

## 2025-07-25 DIAGNOSIS — D53.9 MACROCYTIC ANEMIA: Primary | ICD-10-CM

## 2025-07-25 DIAGNOSIS — D64.9 ANEMIA, UNSPECIFIED TYPE: ICD-10-CM

## 2025-07-25 LAB
BASOPHILS # BLD: 0 K/UL (ref 0–0.2)
BASOPHILS NFR BLD: 0 % (ref 0–2)
EOSINOPHIL # BLD: 0 K/UL (ref 0–0.44)
EOSINOPHILS RELATIVE PERCENT: 0 % (ref 1–4)
ERYTHROCYTE [DISTWIDTH] IN BLOOD BY AUTOMATED COUNT: 20.9 % (ref 11.8–14.4)
HCT VFR BLD AUTO: 19.4 % (ref 40.7–50.3)
HGB BLD-MCNC: 6.4 G/DL (ref 13–17)
IMM GRANULOCYTES # BLD AUTO: 0 K/UL (ref 0–0.3)
IMM GRANULOCYTES NFR BLD: 0 %
LYMPHOCYTES NFR BLD: 7.22 K/UL (ref 1.1–3.7)
LYMPHOCYTES RELATIVE PERCENT: 85 % (ref 24–43)
MCH RBC QN AUTO: 36.6 PG (ref 25.2–33.5)
MCHC RBC AUTO-ENTMCNC: 33 G/DL (ref 28.4–34.8)
MCV RBC AUTO: 110.9 FL (ref 82.6–102.9)
MONOCYTES NFR BLD: 0.43 K/UL (ref 0.1–1.2)
MONOCYTES NFR BLD: 5 % (ref 3–12)
MORPHOLOGY: ABNORMAL
MORPHOLOGY: ABNORMAL
NEUTROPHILS NFR BLD: 10 % (ref 36–65)
NEUTS SEG NFR BLD: 0.85 K/UL (ref 1.5–8.1)
NRBC BLD-RTO: 0 PER 100 WBC
PLATELET # BLD AUTO: 198 K/UL (ref 138–453)
PMV BLD AUTO: 9.4 FL (ref 8.1–13.5)
RBC # BLD AUTO: 1.75 M/UL (ref 4.21–5.77)
WBC OTHER # BLD: 8.5 K/UL (ref 3.5–11.3)

## 2025-07-25 PROCEDURE — 36430 TRANSFUSION BLD/BLD COMPNT: CPT

## 2025-07-25 PROCEDURE — 96372 THER/PROPH/DIAG INJ SC/IM: CPT

## 2025-07-25 PROCEDURE — 36415 COLL VENOUS BLD VENIPUNCTURE: CPT

## 2025-07-25 PROCEDURE — 86901 BLOOD TYPING SEROLOGIC RH(D): CPT

## 2025-07-25 PROCEDURE — 86920 COMPATIBILITY TEST SPIN: CPT

## 2025-07-25 PROCEDURE — 86900 BLOOD TYPING SEROLOGIC ABO: CPT

## 2025-07-25 PROCEDURE — 6360000002 HC RX W HCPCS: Performed by: INTERNAL MEDICINE

## 2025-07-25 PROCEDURE — 2580000003 HC RX 258: Performed by: INTERNAL MEDICINE

## 2025-07-25 PROCEDURE — 86850 RBC ANTIBODY SCREEN: CPT

## 2025-07-25 PROCEDURE — 85025 COMPLETE CBC W/AUTO DIFF WBC: CPT

## 2025-07-25 PROCEDURE — P9016 RBC LEUKOCYTES REDUCED: HCPCS

## 2025-07-25 RX ORDER — DIPHENHYDRAMINE HYDROCHLORIDE 50 MG/ML
50 INJECTION, SOLUTION INTRAMUSCULAR; INTRAVENOUS
OUTPATIENT
Start: 2025-08-08

## 2025-07-25 RX ORDER — ACETAMINOPHEN 325 MG/1
650 TABLET ORAL
OUTPATIENT
Start: 2025-08-08

## 2025-07-25 RX ORDER — HYDROCORTISONE SODIUM SUCCINATE 100 MG/2ML
100 INJECTION INTRAMUSCULAR; INTRAVENOUS
OUTPATIENT
Start: 2025-08-08

## 2025-07-25 RX ORDER — FAMOTIDINE 10 MG/ML
20 INJECTION, SOLUTION INTRAVENOUS
OUTPATIENT
Start: 2025-08-08

## 2025-07-25 RX ORDER — ALBUTEROL SULFATE 90 UG/1
4 INHALANT RESPIRATORY (INHALATION) PRN
OUTPATIENT
Start: 2025-08-08

## 2025-07-25 RX ORDER — SODIUM CHLORIDE 9 MG/ML
INJECTION, SOLUTION INTRAVENOUS CONTINUOUS
OUTPATIENT
Start: 2025-08-08

## 2025-07-25 RX ORDER — EPINEPHRINE 1 MG/ML
0.3 INJECTION, SOLUTION INTRAMUSCULAR; SUBCUTANEOUS PRN
OUTPATIENT
Start: 2025-08-08

## 2025-07-25 RX ORDER — ONDANSETRON 2 MG/ML
8 INJECTION INTRAMUSCULAR; INTRAVENOUS
OUTPATIENT
Start: 2025-08-08

## 2025-07-25 RX ORDER — SODIUM CHLORIDE 9 MG/ML
INJECTION, SOLUTION INTRAVENOUS PRN
Status: COMPLETED | OUTPATIENT
Start: 2025-07-25 | End: 2025-07-25

## 2025-07-25 RX ADMIN — DARBEPOETIN ALFA 300 MCG: 300 INJECTION, SOLUTION INTRAVENOUS; SUBCUTANEOUS at 12:32

## 2025-07-25 RX ADMIN — SODIUM CHLORIDE: 0.9 INJECTION, SOLUTION INTRAVENOUS at 09:04

## 2025-07-25 NOTE — PROGRESS NOTES
Pt arrived for Aranesp injection.   Denies any complaints or concerns .  Vials obtained   Labs reviewed   Writer informed MD of Hgb 6.4 , orders received to give 1 unit PRBC.   Aranesp injection tolerated well , band aide applied to site.   IV started per protocol  Orders reviewed  1 unit PRBC transfused with no sign of adverse reaction; line flushed.  Stable vitals as charted .  IV removed , pressure dressing applied   Patient discharged

## 2025-07-26 LAB
ABO/RH: NORMAL
ANTIBODY SCREEN: NEGATIVE
ARM BAND NUMBER: NORMAL
BLOOD BANK DISPENSE STATUS: NORMAL
BLOOD BANK SAMPLE EXPIRATION: NORMAL
BPU ID: NORMAL
COMPONENT: NORMAL
CROSSMATCH RESULT: NORMAL
TRANSFUSION STATUS: NORMAL
UNIT DIVISION: 0

## 2025-08-08 ENCOUNTER — HOSPITAL ENCOUNTER (OUTPATIENT)
Facility: MEDICAL CENTER | Age: 89
Discharge: HOME | End: 2025-08-08
Payer: MEDICARE

## 2025-08-08 ENCOUNTER — HOSPITAL ENCOUNTER (OUTPATIENT)
Dept: INFUSION THERAPY | Facility: MEDICAL CENTER | Age: 89
Discharge: HOME | End: 2025-08-08
Payer: MEDICARE

## 2025-08-08 VITALS
TEMPERATURE: 97.7 F | DIASTOLIC BLOOD PRESSURE: 58 MMHG | OXYGEN SATURATION: 97 % | BODY MASS INDEX: 27.81 KG/M2 | WEIGHT: 182.9 LBS | RESPIRATION RATE: 16 BRPM | HEART RATE: 60 BPM | SYSTOLIC BLOOD PRESSURE: 119 MMHG

## 2025-08-08 DIAGNOSIS — D53.9 MACROCYTIC ANEMIA: Primary | ICD-10-CM

## 2025-08-08 DIAGNOSIS — D64.9 ANEMIA, UNSPECIFIED TYPE: ICD-10-CM

## 2025-08-08 LAB
BASOPHILS # BLD: 0 K/UL (ref 0–0.2)
BASOPHILS NFR BLD: 0 %
EOSINOPHIL # BLD: 0.08 K/UL (ref 0–0.4)
EOSINOPHILS RELATIVE PERCENT: 1 % (ref 1–4)
ERYTHROCYTE [DISTWIDTH] IN BLOOD BY AUTOMATED COUNT: 23.3 % (ref 11.8–14.4)
HCT VFR BLD AUTO: 20.9 % (ref 40.7–50.3)
HGB BLD-MCNC: 6.8 G/DL (ref 13–17)
IMM GRANULOCYTES # BLD AUTO: 0 K/UL (ref 0–0.3)
IMM GRANULOCYTES NFR BLD: 0 %
LYMPHOCYTES NFR BLD: 6.21 K/UL (ref 1–4.8)
LYMPHOCYTES RELATIVE PERCENT: 83 % (ref 24–44)
MCH RBC QN AUTO: 36.2 PG (ref 25.2–33.5)
MCHC RBC AUTO-ENTMCNC: 32.5 G/DL (ref 28.4–34.8)
MCV RBC AUTO: 111.2 FL (ref 82.6–102.9)
MONOCYTES NFR BLD: 0.38 K/UL (ref 0.2–0.8)
MONOCYTES NFR BLD: 5 % (ref 1–7)
MORPHOLOGY: ABNORMAL
MORPHOLOGY: ABNORMAL
NEUTROPHILS NFR BLD: 11 % (ref 36–66)
NEUTS SEG NFR BLD: 0.83 K/UL (ref 1.8–7.7)
NRBC BLD-RTO: 0 PER 100 WBC
PLATELET # BLD AUTO: 214 K/UL (ref 138–453)
PMV BLD AUTO: 9.4 FL (ref 8.1–13.5)
RBC # BLD AUTO: 1.88 M/UL (ref 4.21–5.77)
WBC OTHER # BLD: 7.5 K/UL (ref 3.5–11.3)

## 2025-08-08 PROCEDURE — 85025 COMPLETE CBC W/AUTO DIFF WBC: CPT

## 2025-08-08 PROCEDURE — 86850 RBC ANTIBODY SCREEN: CPT

## 2025-08-08 PROCEDURE — 36430 TRANSFUSION BLD/BLD COMPNT: CPT

## 2025-08-08 PROCEDURE — 86920 COMPATIBILITY TEST SPIN: CPT

## 2025-08-08 PROCEDURE — 6360000002 HC RX W HCPCS: Performed by: INTERNAL MEDICINE

## 2025-08-08 PROCEDURE — 96372 THER/PROPH/DIAG INJ SC/IM: CPT

## 2025-08-08 PROCEDURE — 2580000003 HC RX 258: Performed by: INTERNAL MEDICINE

## 2025-08-08 PROCEDURE — P9016 RBC LEUKOCYTES REDUCED: HCPCS

## 2025-08-08 PROCEDURE — 36415 COLL VENOUS BLD VENIPUNCTURE: CPT

## 2025-08-08 PROCEDURE — 86900 BLOOD TYPING SEROLOGIC ABO: CPT

## 2025-08-08 PROCEDURE — 86901 BLOOD TYPING SEROLOGIC RH(D): CPT

## 2025-08-08 RX ORDER — HYDROCORTISONE SODIUM SUCCINATE 100 MG/2ML
100 INJECTION INTRAMUSCULAR; INTRAVENOUS
OUTPATIENT
Start: 2025-08-22

## 2025-08-08 RX ORDER — ONDANSETRON 2 MG/ML
8 INJECTION INTRAMUSCULAR; INTRAVENOUS
OUTPATIENT
Start: 2025-08-22

## 2025-08-08 RX ORDER — FAMOTIDINE 10 MG/ML
20 INJECTION, SOLUTION INTRAVENOUS
OUTPATIENT
Start: 2025-08-22

## 2025-08-08 RX ORDER — ALBUTEROL SULFATE 90 UG/1
4 INHALANT RESPIRATORY (INHALATION) PRN
OUTPATIENT
Start: 2025-08-22

## 2025-08-08 RX ORDER — SODIUM CHLORIDE 9 MG/ML
INJECTION, SOLUTION INTRAVENOUS PRN
Status: COMPLETED | OUTPATIENT
Start: 2025-08-08 | End: 2025-08-08

## 2025-08-08 RX ORDER — DIPHENHYDRAMINE HYDROCHLORIDE 50 MG/ML
50 INJECTION, SOLUTION INTRAMUSCULAR; INTRAVENOUS
OUTPATIENT
Start: 2025-08-22

## 2025-08-08 RX ORDER — ACETAMINOPHEN 325 MG/1
650 TABLET ORAL
OUTPATIENT
Start: 2025-08-22

## 2025-08-08 RX ORDER — SODIUM CHLORIDE 9 MG/ML
INJECTION, SOLUTION INTRAVENOUS CONTINUOUS
OUTPATIENT
Start: 2025-08-22

## 2025-08-08 RX ORDER — EPINEPHRINE 1 MG/ML
0.3 INJECTION, SOLUTION INTRAMUSCULAR; SUBCUTANEOUS PRN
OUTPATIENT
Start: 2025-08-22

## 2025-08-08 RX ADMIN — DARBEPOETIN ALFA 300 MCG: 300 INJECTION, SOLUTION INTRAVENOUS; SUBCUTANEOUS at 09:10

## 2025-08-08 RX ADMIN — SODIUM CHLORIDE: 0.9 INJECTION, SOLUTION INTRAVENOUS at 08:52

## 2025-08-18 RX ORDER — FOLIC ACID 1 MG/1
1 TABLET ORAL DAILY
Qty: 90 TABLET | Refills: 3 | Status: SHIPPED | OUTPATIENT
Start: 2025-08-18

## 2025-08-22 ENCOUNTER — HOSPITAL ENCOUNTER (OUTPATIENT)
Dept: INFUSION THERAPY | Facility: MEDICAL CENTER | Age: 89
Discharge: HOME | End: 2025-08-22
Payer: MEDICARE

## 2025-08-22 ENCOUNTER — HOSPITAL ENCOUNTER (OUTPATIENT)
Dept: LAB | Age: 89
Setting detail: SPECIMEN
Discharge: HOME | End: 2025-08-22
Payer: MEDICARE

## 2025-08-22 VITALS
HEART RATE: 77 BPM | WEIGHT: 178.9 LBS | TEMPERATURE: 98.1 F | DIASTOLIC BLOOD PRESSURE: 60 MMHG | SYSTOLIC BLOOD PRESSURE: 128 MMHG | RESPIRATION RATE: 18 BRPM | BODY MASS INDEX: 27.2 KG/M2

## 2025-08-22 DIAGNOSIS — D64.9 ANEMIA, UNSPECIFIED TYPE: ICD-10-CM

## 2025-08-22 DIAGNOSIS — D53.9 MACROCYTIC ANEMIA: Primary | ICD-10-CM

## 2025-08-22 LAB
BASOPHILS # BLD: 0.05 K/UL (ref 0–0.2)
BASOPHILS NFR BLD: 1 % (ref 0–2)
EOSINOPHIL # BLD: 0.05 K/UL (ref 0–0.44)
EOSINOPHILS RELATIVE PERCENT: 1 % (ref 1–4)
ERYTHROCYTE [DISTWIDTH] IN BLOOD BY AUTOMATED COUNT: 23.6 % (ref 11.8–14.4)
HCT VFR BLD AUTO: 21.6 % (ref 40.7–50.3)
HGB BLD-MCNC: 7.2 G/DL (ref 13–17)
IMM GRANULOCYTES # BLD AUTO: 0 K/UL (ref 0–0.3)
IMM GRANULOCYTES NFR BLD: 0 %
LYMPHOCYTES NFR BLD: 4.16 K/UL (ref 1.1–3.7)
LYMPHOCYTES RELATIVE PERCENT: 80 % (ref 24–43)
MCH RBC QN AUTO: 35.5 PG (ref 25.2–33.5)
MCHC RBC AUTO-ENTMCNC: 33.3 G/DL (ref 28.4–34.8)
MCV RBC AUTO: 106.4 FL (ref 82.6–102.9)
MONOCYTES NFR BLD: 0.16 K/UL (ref 0.1–1.2)
MONOCYTES NFR BLD: 3 % (ref 3–12)
MORPHOLOGY: ABNORMAL
MORPHOLOGY: ABNORMAL
NEUTROPHILS NFR BLD: 15 % (ref 36–65)
NEUTS SEG NFR BLD: 0.78 K/UL (ref 1.5–8.1)
NRBC BLD-RTO: 0 PER 100 WBC
PLATELET # BLD AUTO: 215 K/UL (ref 138–453)
PMV BLD AUTO: 9.5 FL (ref 8.1–13.5)
RBC # BLD AUTO: 2.03 M/UL (ref 4.21–5.77)
WBC OTHER # BLD: 5.2 K/UL (ref 3.5–11.3)

## 2025-08-22 PROCEDURE — 36415 COLL VENOUS BLD VENIPUNCTURE: CPT

## 2025-08-22 PROCEDURE — 6360000002 HC RX W HCPCS: Performed by: INTERNAL MEDICINE

## 2025-08-22 PROCEDURE — 96372 THER/PROPH/DIAG INJ SC/IM: CPT

## 2025-08-22 PROCEDURE — 85025 COMPLETE CBC W/AUTO DIFF WBC: CPT

## 2025-08-22 RX ORDER — ALBUTEROL SULFATE 90 UG/1
4 INHALANT RESPIRATORY (INHALATION) PRN
OUTPATIENT
Start: 2025-09-05

## 2025-08-22 RX ORDER — EPINEPHRINE 1 MG/ML
0.3 INJECTION, SOLUTION INTRAMUSCULAR; SUBCUTANEOUS PRN
OUTPATIENT
Start: 2025-09-05

## 2025-08-22 RX ORDER — SODIUM CHLORIDE 9 MG/ML
INJECTION, SOLUTION INTRAVENOUS CONTINUOUS
OUTPATIENT
Start: 2025-09-05

## 2025-08-22 RX ORDER — ACETAMINOPHEN 325 MG/1
650 TABLET ORAL
OUTPATIENT
Start: 2025-09-05

## 2025-08-22 RX ORDER — DIPHENHYDRAMINE HYDROCHLORIDE 50 MG/ML
50 INJECTION, SOLUTION INTRAMUSCULAR; INTRAVENOUS
OUTPATIENT
Start: 2025-09-05

## 2025-08-22 RX ORDER — FAMOTIDINE 10 MG/ML
20 INJECTION, SOLUTION INTRAVENOUS
OUTPATIENT
Start: 2025-09-05

## 2025-08-22 RX ORDER — HYDROCORTISONE SODIUM SUCCINATE 100 MG/2ML
100 INJECTION INTRAMUSCULAR; INTRAVENOUS
OUTPATIENT
Start: 2025-09-05

## 2025-08-22 RX ORDER — ONDANSETRON 2 MG/ML
8 INJECTION INTRAMUSCULAR; INTRAVENOUS
OUTPATIENT
Start: 2025-09-05

## 2025-08-22 RX ADMIN — DARBEPOETIN ALFA 300 MCG: 300 INJECTION, SOLUTION INTRAVENOUS; SUBCUTANEOUS at 08:36

## 2025-09-05 ENCOUNTER — HOSPITAL ENCOUNTER (OUTPATIENT)
Dept: INFUSION THERAPY | Facility: MEDICAL CENTER | Age: 89
Discharge: HOME | End: 2025-09-05
Payer: MEDICARE

## 2025-09-05 ENCOUNTER — TELEPHONE (OUTPATIENT)
Age: 89
End: 2025-09-05

## 2025-09-05 ENCOUNTER — HOSPITAL ENCOUNTER (OUTPATIENT)
Facility: MEDICAL CENTER | Age: 89
Discharge: HOME | End: 2025-09-05
Payer: MEDICARE

## 2025-09-05 VITALS
DIASTOLIC BLOOD PRESSURE: 74 MMHG | TEMPERATURE: 98.2 F | SYSTOLIC BLOOD PRESSURE: 126 MMHG | HEART RATE: 63 BPM | RESPIRATION RATE: 16 BRPM | OXYGEN SATURATION: 98 %

## 2025-09-05 DIAGNOSIS — D53.9 MACROCYTIC ANEMIA: Primary | ICD-10-CM

## 2025-09-05 DIAGNOSIS — D64.9 ANEMIA, UNSPECIFIED TYPE: ICD-10-CM

## 2025-09-05 LAB
BASOPHILS # BLD: 0.07 K/UL (ref 0–0.2)
BASOPHILS NFR BLD: 1 %
EOSINOPHIL # BLD: 0 K/UL (ref 0–0.4)
EOSINOPHILS RELATIVE PERCENT: 0 % (ref 1–4)
ERYTHROCYTE [DISTWIDTH] IN BLOOD BY AUTOMATED COUNT: 23.2 % (ref 11.8–14.4)
HCT VFR BLD AUTO: 19.3 % (ref 40.7–50.3)
HGB BLD-MCNC: 6.6 G/DL (ref 13–17)
IMM GRANULOCYTES # BLD AUTO: 0 K/UL (ref 0–0.3)
IMM GRANULOCYTES NFR BLD: 0 %
LYMPHOCYTES NFR BLD: 6.12 K/UL (ref 1–4.8)
LYMPHOCYTES RELATIVE PERCENT: 85 % (ref 24–44)
MCH RBC QN AUTO: 36.3 PG (ref 25.2–33.5)
MCHC RBC AUTO-ENTMCNC: 34.2 G/DL (ref 28.4–34.8)
MCV RBC AUTO: 106 FL (ref 82.6–102.9)
MONOCYTES NFR BLD: 0.22 K/UL (ref 0.2–0.8)
MONOCYTES NFR BLD: 3 % (ref 1–7)
MORPHOLOGY: ABNORMAL
MORPHOLOGY: ABNORMAL
NEUTROPHILS NFR BLD: 11 % (ref 36–66)
NEUTS SEG NFR BLD: 0.79 K/UL (ref 1.8–7.7)
NRBC BLD-RTO: 0 PER 100 WBC
PLATELET # BLD AUTO: 276 K/UL (ref 138–453)
PMV BLD AUTO: 9.7 FL (ref 8.1–13.5)
RBC # BLD AUTO: 1.82 M/UL (ref 4.21–5.77)
WBC OTHER # BLD: 7.2 K/UL (ref 3.5–11.3)

## 2025-09-05 PROCEDURE — 86850 RBC ANTIBODY SCREEN: CPT

## 2025-09-05 PROCEDURE — 86920 COMPATIBILITY TEST SPIN: CPT

## 2025-09-05 PROCEDURE — 86900 BLOOD TYPING SEROLOGIC ABO: CPT

## 2025-09-05 PROCEDURE — P9016 RBC LEUKOCYTES REDUCED: HCPCS

## 2025-09-05 PROCEDURE — 96372 THER/PROPH/DIAG INJ SC/IM: CPT

## 2025-09-05 PROCEDURE — 36430 TRANSFUSION BLD/BLD COMPNT: CPT

## 2025-09-05 PROCEDURE — 85025 COMPLETE CBC W/AUTO DIFF WBC: CPT

## 2025-09-05 PROCEDURE — 6360000002 HC RX W HCPCS: Performed by: INTERNAL MEDICINE

## 2025-09-05 PROCEDURE — 36415 COLL VENOUS BLD VENIPUNCTURE: CPT

## 2025-09-05 PROCEDURE — 86901 BLOOD TYPING SEROLOGIC RH(D): CPT

## 2025-09-05 RX ORDER — ALBUTEROL SULFATE 90 UG/1
4 INHALANT RESPIRATORY (INHALATION) PRN
OUTPATIENT
Start: 2025-09-19

## 2025-09-05 RX ORDER — DIPHENHYDRAMINE HYDROCHLORIDE 50 MG/ML
50 INJECTION, SOLUTION INTRAMUSCULAR; INTRAVENOUS
OUTPATIENT
Start: 2025-09-19

## 2025-09-05 RX ORDER — ACETAMINOPHEN 325 MG/1
650 TABLET ORAL
OUTPATIENT
Start: 2025-09-19

## 2025-09-05 RX ORDER — EPINEPHRINE 1 MG/ML
0.3 INJECTION, SOLUTION INTRAMUSCULAR; SUBCUTANEOUS PRN
OUTPATIENT
Start: 2025-09-19

## 2025-09-05 RX ORDER — SODIUM CHLORIDE 9 MG/ML
INJECTION, SOLUTION INTRAVENOUS PRN
Status: DISCONTINUED | OUTPATIENT
Start: 2025-09-05 | End: 2025-09-06 | Stop reason: HOSPADM

## 2025-09-05 RX ORDER — ONDANSETRON 2 MG/ML
8 INJECTION INTRAMUSCULAR; INTRAVENOUS
OUTPATIENT
Start: 2025-09-19

## 2025-09-05 RX ORDER — SODIUM CHLORIDE 9 MG/ML
INJECTION, SOLUTION INTRAVENOUS CONTINUOUS
OUTPATIENT
Start: 2025-09-19

## 2025-09-05 RX ORDER — FAMOTIDINE 10 MG/ML
20 INJECTION, SOLUTION INTRAVENOUS
OUTPATIENT
Start: 2025-09-19

## 2025-09-05 RX ORDER — HYDROCORTISONE SODIUM SUCCINATE 100 MG/2ML
100 INJECTION INTRAMUSCULAR; INTRAVENOUS
OUTPATIENT
Start: 2025-09-19

## 2025-09-05 RX ADMIN — DARBEPOETIN ALFA 300 MCG: 300 INJECTION, SOLUTION INTRAVENOUS; SUBCUTANEOUS at 09:29

## 2025-09-06 LAB
ABO/RH: NORMAL
ANTIBODY SCREEN: NEGATIVE
ARM BAND NUMBER: NORMAL
BLOOD BANK DISPENSE STATUS: NORMAL
BLOOD BANK SAMPLE EXPIRATION: NORMAL
BPU ID: NORMAL
COMPONENT: NORMAL
CROSSMATCH RESULT: NORMAL
TRANSFUSION STATUS: NORMAL
UNIT DIVISION: 0

## (undated) DEVICE — FORCEPS BX L240CM WRK CHN 2.8MM STD CAP W/ NDL MIC MESH